# Patient Record
Sex: FEMALE | Race: WHITE
[De-identification: names, ages, dates, MRNs, and addresses within clinical notes are randomized per-mention and may not be internally consistent; named-entity substitution may affect disease eponyms.]

---
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
## 2018-09-02 ENCOUNTER — HOSPITAL ENCOUNTER (EMERGENCY)
Dept: HOSPITAL 75 - ER | Age: 18
Discharge: HOME | End: 2018-09-02
Payer: SELF-PAY

## 2018-09-02 VITALS — WEIGHT: 207 LBS | BODY MASS INDEX: 33.27 KG/M2 | HEIGHT: 66 IN

## 2018-09-02 DIAGNOSIS — E11.65: ICD-10-CM

## 2018-09-02 DIAGNOSIS — F17.210: ICD-10-CM

## 2018-09-02 DIAGNOSIS — Z90.89: ICD-10-CM

## 2018-09-02 DIAGNOSIS — L02.31: Primary | ICD-10-CM

## 2018-09-02 DIAGNOSIS — Z87.19: ICD-10-CM

## 2018-09-02 DIAGNOSIS — Z87.440: ICD-10-CM

## 2018-09-02 DIAGNOSIS — Z88.1: ICD-10-CM

## 2018-09-02 LAB
ALBUMIN SERPL-MCNC: 4 GM/DL (ref 3.2–4.5)
ALP SERPL-CCNC: 59 U/L (ref 40–136)
ALT SERPL-CCNC: 13 U/L (ref 0–55)
BASOPHILS # BLD AUTO: 0.1 10^3/UL (ref 0–0.1)
BASOPHILS NFR BLD AUTO: 0 % (ref 0–10)
BILIRUB SERPL-MCNC: 0.2 MG/DL (ref 0.1–1)
BUN/CREAT SERPL: 15
CALCIUM SERPL-MCNC: 9.2 MG/DL (ref 8.5–10.1)
CHLORIDE SERPL-SCNC: 103 MMOL/L (ref 98–107)
CO2 SERPL-SCNC: 21 MMOL/L (ref 21–32)
CREAT SERPL-MCNC: 0.89 MG/DL (ref 0.6–1.3)
EOSINOPHIL # BLD AUTO: 0.1 10^3/UL (ref 0–0.3)
EOSINOPHIL NFR BLD AUTO: 0 % (ref 0–10)
ERYTHROCYTE [DISTWIDTH] IN BLOOD BY AUTOMATED COUNT: 12.7 % (ref 10–14.5)
GFR SERPLBLD BASED ON 1.73 SQ M-ARVRAT: > 60 ML/MIN
GLUCOSE SERPL-MCNC: 300 MG/DL (ref 70–105)
HCT VFR BLD CALC: 37 % (ref 35–52)
HGB BLD-MCNC: 13.4 G/DL (ref 11.5–16)
LYMPHOCYTES # BLD AUTO: 2.7 X 10^3 (ref 1–4)
LYMPHOCYTES NFR BLD AUTO: 22 % (ref 12–44)
MANUAL DIFFERENTIAL PERFORMED BLD QL: NO
MCH RBC QN AUTO: 31 PG (ref 25–34)
MCHC RBC AUTO-ENTMCNC: 36 G/DL (ref 32–36)
MCV RBC AUTO: 86 FL (ref 80–99)
MONOCYTES # BLD AUTO: 0.6 X 10^3 (ref 0–1)
MONOCYTES NFR BLD AUTO: 5 % (ref 0–12)
NEUTROPHILS # BLD AUTO: 8.5 X 10^3 (ref 1.8–7.8)
NEUTROPHILS NFR BLD AUTO: 72 % (ref 42–75)
PLATELET # BLD: 341 10^3/UL (ref 130–400)
PMV BLD AUTO: 9.5 FL (ref 7.4–10.4)
POTASSIUM SERPL-SCNC: 3.9 MMOL/L (ref 3.6–5)
PROT SERPL-MCNC: 6.4 GM/DL (ref 6.4–8.2)
RBC # BLD AUTO: 4.33 10^6/UL (ref 4.35–5.85)
SODIUM SERPL-SCNC: 136 MMOL/L (ref 135–145)
WBC # BLD AUTO: 11.9 10^3/UL (ref 4.3–11)

## 2018-09-02 PROCEDURE — 96361 HYDRATE IV INFUSION ADD-ON: CPT

## 2018-09-02 PROCEDURE — 87070 CULTURE OTHR SPECIMN AEROBIC: CPT

## 2018-09-02 PROCEDURE — 36415 COLL VENOUS BLD VENIPUNCTURE: CPT

## 2018-09-02 PROCEDURE — 87205 SMEAR GRAM STAIN: CPT

## 2018-09-02 PROCEDURE — 87040 BLOOD CULTURE FOR BACTERIA: CPT

## 2018-09-02 PROCEDURE — 85025 COMPLETE CBC W/AUTO DIFF WBC: CPT

## 2018-09-02 PROCEDURE — 82962 GLUCOSE BLOOD TEST: CPT

## 2018-09-02 PROCEDURE — 96374 THER/PROPH/DIAG INJ IV PUSH: CPT

## 2018-09-02 PROCEDURE — 83605 ASSAY OF LACTIC ACID: CPT

## 2018-09-02 PROCEDURE — 80053 COMPREHEN METABOLIC PANEL: CPT

## 2018-09-02 NOTE — XMS REPORT
[facility]

 Created on: 2018



Melanie Carroll

External Reference #: [id number]

: 2000

Sex: Female



Demographics







 Address  [address] [place]  [address]

 

 Preferred Language  Unknown

 

 Marital Status  Unknown

 

 Yarsanism Affiliation  Unknown

 

 Race  Unknown

 

 Ethnic Group  Unknown





Author







 Author  PRAMOD  DEANNE

 

 Organization  [facility]

 

 Address  [address] [place]  [address]



 

 Phone  [phone]







Care Team Providers







 Care Team Member Name  Role  Phone

 

 DEANNE BENAVIDEZ  Unavailable  [phone]







PROBLEMS







 Type  Condition  ICD9-CM Code  RHJ74-AL Code  Onset Dates  Condition Status  
SNOMED Code

 

 Problem  Attention deficit disorder     F90.0     Active  814371004

 

 Problem  Mood disorder     F39     Active  25266289

 

 Problem  Depressive disorder, not elsewhere classified     F32.9     Active  
25932327

 

 Problem  Dysthymic disorder     F34.1     Active  06000405

 

 Problem  Anxiety disorder, unspecified     F41.9     Active  420802150

 

 Problem  Social phobia     F40.10     Active  74994651

 

 Problem  Other and unspecified noninfectious gastroenteritis and colitis  
558.9        Active  88195345







ALLERGIES

No Information



ENCOUNTERS







 Encounter  Location  Date  Diagnosis

 

 [facility]  [address] [phone]
[phone]     

 

 [facility]  [address] [address]-
[address]     

 

 [facility]  [address] [address]
2697  20 2018  Cystitis N30.90 and Dysuria R30.0

 

 [facility]  [id number] [place] [address] 
[id number]  15 2018  Dental examination Z01.20

 

 [facility]  [address] [phone]
[phone]  15 2018  Fever, unspecified fever cause R50.9 and Acute 
nasopharyngitis J00

 

 [facility]  [address] [address]-
[address]  31 Aug, 2017  Mood disorder F39

 

 [facility]  [address] [address]-
[address]  21 Aug, 2017  Mood disorder F39

 

 [facility]  [address] [address]-
2214  22 Mar, 2017  Depressive disorder, not elsewhere classified F32.9 and 
Social phobia F40.10

 

 [facility]  [address] [id number] 
[id number]  22 Mar, 2016  Encounter for dental examination Z01.20

 

 [facility]  [id number] [place] [id number]
[phone]  29 Sep, 2015  Depressive disorder, not elsewhere classified 311 ; Anxiety 
state, unspecified 300.00 and Attention deficit disorder without mention of 
hyperactivity 314.00

 

 [facility]  [id number] [place] [id number]
[phone]  01 Sep, 2015  Depressive disorder, not elsewhere classified 311 ; Anxiety 
state, unspecified 300.00 and Attention deficit disorder without mention of 
hyperactivity 314.00

 

 [facility]  [address] [place] [address]-
2728    Depressive disorder, not elsewhere classified 311

 

 [facility]  [id number] [place] [id number]
[phone]  13 May, 2015  Depressive disorder, not elsewhere classified 311

 

 [facility]  [id number] [place] [phone]
[phone]     

 

 [facility]  [address] [address]-
[address]     

 

 [facility]  [address] [place] [address] [id number]
[phone]     

 

 [facility]  [address] [place] [phone]
[phone]  13 Mar, 2015   

 

 [facility]  [id number] [place] [address] [phone]
[phone]  13 Mar, 2015   

 

 [facility]  [address] [address]-
[address]     

 

 [facility]  [id number] [place] [address]-
2274     

 

 [facility]  [address] [id number] [place] [address]-
6201  12 Dec, 2014   

 

 [facility] [place] [facility]  [address] [id number] [place] [address]-
5152  12 Dec, 2014   

 

 [facility]  [address] [id number] [place] [address]-
[address]     

 

 [facility] [place] [facility]  [address] [id number] [place] [address]-
[address]     

 

 [facility]  [address] [id number] [place] [address]-
7301  09 Oct, 2014   

 

 [facility] [place] [facility]  [address] [id number] [place] [address]-
6269  09 Oct, 2014   

 

 [facility] [place] [facility]  [address] [id number] [place] [address]-
8518  22 Aug, 2014   

 

 [facility] [place] [facility]  [address] [id number] [place] [address]-
2294  22 Aug, 2014   

 

 [facility] [place] [facility]  [address] [id number] [place] [address]-
6819  10 Jul, 2014   

 

 [facility]  [address] [id number] [place] [address]-
2269  10 Jul, 2014   

 

 [facility] [place] [facility]  [address] [id number] [place] [address]-
[address]  23 May, 2014   

 

 [facility] [place] [facility]  [address] [id number] [place] [address]-
9026  23 May, 2014   

 

 [facility] [place] [facility]  [address] [id number] [place] [address]-
59[address]     

 

 [facility] [place] [facility]  [address] [id number] [place] [address]-
6686  05 Dec, 2012   

 

 [facility] [place] [facility]  [address] [id number] [place] [address]-
8228  05 Dec, 2012   

 

 [facility]  [address] [facility] [id number] [place] [id number]-
6664  17 Dec, 2008   







IMMUNIZATIONS

No Known Immunizations



SOCIAL HISTORY

Never Assessed



REASON FOR VISIT





PLAN OF CARE





VITAL SIGNS





MEDICATIONS

Unknown Medications



RESULTS

No Results



PROCEDURES

No Known procedures



INSTRUCTIONS





MEDICATIONS ADMINISTERED

No Known Medications



MEDICAL (GENERAL) HISTORY







 Type  Description  Date

 

 Medical History  Diabetes-II   

 

 Surgical History  Tonsils removed  

 

 Surgical History  Blood infection  2011

## 2018-09-02 NOTE — ED INTEGUMENTARY GENERAL
General


Chief Complaint:  Skin/Wound Problems


Stated Complaint:  BOIL IN BETWEEN LEGS


Nursing Triage Note:  


left lower buttock area of concern


Source:  patient


Exam Limitations:  no limitations





History of Present Illness


Date Seen by Provider:  Sep 2, 2018


Time Seen by Provider:  22:00


Initial Comments


Patient is a 18-year-old female who presents to emergency room with complaints 

of an abscess to her left lower gluteal fold. She reports that this appeared 2 

days ago and is very painful she is rating pain 10 out of 10. She also reports 

fever, she is a type II diabetic and has not been on her medications for over a 

month. She reports that the abscess is open and draining for 1 day.


Timing/Duration:  other (2 days)


Location:  genitalia (left gluteal fold)


Associated Symptoms:  fever





Allergies and Home Medications


Allergies


Coded Allergies:  


     ceftriaxone (Unverified  Allergy, Mild, HIVES, 8/30/10)





Home Medications


Hydrocodone Bit/Acetaminophen 1 Tab Tab, 1 EACH PO Q6H PRN for PAIN


   Prescribed by: NAEL SANTOS on 9/2/18 2151


Sulfamethoxazole/Trimethoprim 1 Each Tablet, 1 EACH PO BID


   Prescribed by: NAEL SANTOS on 9/2/18 2151





Patient Home Medication List


Home Medication List Reviewed:  Yes





Review of Systems


Review of Systems


Constitutional:  see HPI, chills, fever


Pregnant:  No


Skin:  see HPI, other (abscess to her left intergluteal fold.)





All Other Systems Reviewed


Negative Unless Noted:  Yes





Past Medical-Social-Family Hx


Past Med/Social Hx:  Reviewed Nursing Past Med/Soc Hx


Patient Social History


Alcohol Use:  Denies Use


Recreational Drug Use:  No


Smoking Status:  Current Everyday Smoker


Type Used:  Cigarettes


2nd Hand Smoke Exposure:  Yes


Recent Foreign Travel:  No


Contact w/Someone Who Travel:  No


Recent Infectious Disease Expo:  No


Recent Hopitalizations:  No





Immunizations Up To Date


Tetanus Booster (TDap):  Less than 5yrs


PED Vaccines UTD:  No


Date of Pneumonia Vaccine:  Jan 7, 2013


Date of Influenza Vaccine:  Jan 7, 2013





Seasonal Allergies


Seasonal Allergies:  Yes





Past Medical History


Surgeries:  Yes (T&A, MULTIPLE ABSCESS I&D'S, "BLADDER STRETCHED" WHEN YOUNGER)


Adenoidectomy, Bladder Surgery, Tonsillectomy


Respiratory:  No


Cardiac:  No


Neurological:  No


Female Reproductive Disorders:  Denies


Genitourinary:  Yes


UTI-Chronic


Gastrointestinal:  Yes (Chronic constipation)


Chronic Constipation


Musculoskeletal:  No


Endocrine:  No


Diabetes, Insulin dep


HEENT:  No


Cancer:  No


Psychosocial:  No


Integumentary:  Yes


Blood Disorders:  No





Family Medical History


Reviewed Nursing Family Hx





Physical Exam


Vital Signs





Vital Signs - First Documented








 9/2/18 9/2/18





 20:00 22:10


 


Temp 98.9 


 


Pulse 98 


 


Resp 18 


 


B/P (MAP) 149/93 


 


Pulse Ox  99


 


O2 Delivery Room Air 





Capillary Refill :


General Appearance:  WD/WN, no apparent distress


HEENT:  PERRL/EOMI, normal ENT inspection, TMs normal, pharynx normal


Neck:  non-tender, full range of motion, supple, normal inspection


Cardiovascular:  normal peripheral pulses, regular rate, rhythm, no edema, no 

gallop, no JVD, no murmur


Respiratory:  chest non-tender, lungs clear, normal breath sounds, no 

respiratory distress, no accessory muscle use


Gastrointestinal:  normal bowel sounds, non tender, soft, no organomegaly, no 

pulsatile mass


Back:  normal inspection, no CVA tenderness, no vertebral tenderness


Neurologic/Psychiatric:  alert, normal mood/affect, oriented x 3


Skin:  normal color, warm/dry


Skin Problem Location:  other (left inner gluteal fold)


Skin Problem Character:  abscess, drainage (brown purulent drainage), other (

Exam was assisted by Jeannie ALEGRIA.)


Lymphatic:  no adenopathy





Progress/Results/Core Measures


Results/Orders


Lab Results





Laboratory Tests








Test


 9/2/18


20:45 9/2/18


22:00 Range/Units


 


 


White Blood Count


 11.9 H


 


 4.3-11.0


10^3/uL


 


Red Blood Count


 4.33 L


 


 4.35-5.85


10^6/uL


 


Hemoglobin 13.4   11.5-16.0  G/DL


 


Hematocrit 37   35-52  %


 


Mean Corpuscular Volume 86   80-99  FL


 


Mean Corpuscular Hemoglobin 31   25-34  PG


 


Mean Corpuscular Hemoglobin


Concent 36 


 


 32-36  G/DL





 


Red Cell Distribution Width 12.7   10.0-14.5  %


 


Platelet Count


 341 


 


 130-400


10^3/uL


 


Mean Platelet Volume 9.5   7.4-10.4  FL


 


Neutrophils (%) (Auto) 72   42-75  %


 


Lymphocytes (%) (Auto) 22   12-44  %


 


Monocytes (%) (Auto) 5   0-12  %


 


Eosinophils (%) (Auto) 0   0-10  %


 


Basophils (%) (Auto) 0   0-10  %


 


Neutrophils # (Auto) 8.5 H  1.8-7.8  X 10^3


 


Lymphocytes # (Auto) 2.7   1.0-4.0  X 10^3


 


Monocytes # (Auto) 0.6   0.0-1.0  X 10^3


 


Eosinophils # (Auto)


 0.1 


 


 0.0-0.3


10^3/uL


 


Basophils # (Auto)


 0.1 


 


 0.0-0.1


10^3/uL


 


Sodium Level 136   135-145  MMOL/L


 


Potassium Level 3.9   3.6-5.0  MMOL/L


 


Chloride Level 103     MMOL/L


 


Carbon Dioxide Level 21   21-32  MMOL/L


 


Anion Gap 12   5-14  MMOL/L


 


Blood Urea Nitrogen 13   7-18  MG/DL


 


Creatinine


 0.89 


 


 0.60-1.30


MG/DL


 


Estimat Glomerular Filtration


Rate > 60 


 


  





 


BUN/Creatinine Ratio 15    


 


Glucose Level 300 H    MG/DL


 


Lactic Acid Level


 1.24 


 


 0.50-2.00


MMOL/L


 


Calcium Level 9.2   8.5-10.1  MG/DL


 


Corrected Calcium 9.2   8.5-10.1  MG/DL


 


Total Bilirubin 0.2   0.1-1.0  MG/DL


 


Aspartate Amino Transf


(AST/SGOT) 9 


 


 5-34  U/L





 


Alanine Aminotransferase


(ALT/SGPT) 13 


 


 0-55  U/L





 


Alkaline Phosphatase 59     U/L


 


Total Protein 6.4   6.4-8.2  GM/DL


 


Albumin 4.0   3.2-4.5  GM/DL


 


Glucometer  144 H   MG/DL








Micro Results





Microbiology


9/2/18 Blood Culture - Preliminary, Resulted


         No growth


9/2/18 Blood Culture - Preliminary, Resulted


         Staph, Coag Neg (CNS)


         See Comments


9/2/18 Gram Stain - Final, Complete


         


9/2/18 Wound Culture - Final, Complete


         See Comments





My Orders





Orders - NAEL SANTOS


Cbc With Automated Diff (9/2/18 20:30)


Comprehensive Metabolic Panel (9/2/18 20:30)


Blood Culture (9/2/18 20:30)


Saline Lock/Iv-Start (9/2/18 20:30)


Lactic Acid Analyzer (9/2/18 20:30)


Wound Culture (9/2/18 20:51)


Hydrocodone/Apap 7.5/325 Tab (Lortab 7. (9/2/18 21:15)


Insulin (Regular) Human (Humulin R (Per (9/2/18 21:30)


Ns Iv 1000 Ml (Sodium Chloride 0.9%) (9/2/18 21:30)





Medications Given in ED





Vital Signs/I&O











 9/2/18 9/2/18





 20:00 22:10


 


Temp 98.9 97.8


 


Pulse 98 65


 


Resp 18 18


 


B/P (MAP) 149/93 


 


Pulse Ox  99


 


O2 Delivery Room Air Room Air











Progress


Progress Note :  


   Time:  21:30


Progress Note


I have seen and evaluated the patient. A culture was sent of the drainage. Her 

blood sugars have trended down with insulin and Iv fluids. She has remained 

fever free. She agrees with plans for discharge. Close follow up with PCP. 

Return precautions were given.





Departure


Impression





 Primary Impression:  


 Abscess


 Additional Impression:  


 Uncontrolled type 2 diabetes mellitus


Disposition:  01 HOME, SELF-CARE


Condition:  Stable/Unchanged





Departure-Patient Inst.


Decision time for Depature:  21:38


Referrals:  


MARCELA CORREA DO (PCP/Family)


Primary Care Physician


Patient Instructions:  Diabetes Type 2 (DC), Diabetes and Infections, Skin 

Abscess





Add. Discharge Instructions:  


Take medications as directed and as previously prescribed. Follow-up with your 

primary care provider within 1 week for a recheck. Call first thing Tuesday 

morning for an appointment time. Return back to emergency room for any 

worsening symptoms or concerns as needed. All discharge instructions reviewed 

with patient and/or family. Voiced understanding.


Scripts


Hydrocodone Bit/Acetaminophen (Hydrocodone/Acetaminophen 5/325mg Tablet) 1 Tab 

Tab


1 EACH PO Q6H PRN for PAIN, #14 TAB


   Prov: NAEL SANTOS         9/2/18 


Sulfamethoxazole/Trimethoprim (Bactrim Ds Tablet) 1 Each Tablet


1 EACH PO BID for 10 Days, #20 TAB


   Prov: NAEL SANTOS         9/2/18











NAEL SANTOS Sep 2, 2018 20:51

## 2018-09-02 NOTE — XMS REPORT
Continuity of Care Document

 Created on: 2018



PONCE SINGH

External Reference #: [id number]

: 2000

Sex: Female



Demographics







 Address  [address] [place]  [address]

 

 Home Phone  [phone] x

 

 Preferred Language  Unknown

 

 Marital Status  Unknown

 

 Buddhism Affiliation  Unknown

 

 Race  Unknown

 

 Ethnic Group  Unknown





Author







 Author  [facility]

 

 Address  Unknown

 

 Phone  Unavailable



              



Allergies

      





 Active            Description            Code            Type            
Severity            Reaction            Onset            Reported/Identified   
         Relationship to Patient            Clinical Status        

 

 Yes            Rocephin                         Drug Allergy                  
                                 2009                                  

 

 Yes            Rocephin                         Drug Allergy            N/A   
         N/A                         2009                                
  

 

 Yes            ceftriaxone            [id number]            Drug Allergy      
      Mild            HIVES                         2010                 
                 



                      



Medications

      



There is no data.                  



Problems

      





 Date Dx Coded            Attending            Type            Code            
Diagnosis            Diagnosed By        

 

 2008            DEISI NAYLOR DO K                         465.9          
  UPPER RESPIRATORY INFECTION                     

 

 2008            DOUG LENZ LCPC B                         465.9      
      UPPER RESPIRATORY INFECTION                     

 

 2008            LEIGH ANN TEJADA PSYD L                         465.9  
          UPPER RESPIRATORY INFECTION                     

 

 2008            LEIGH ANN TEJADA PSYD L                         465.9  
          UPPER RESPIRATORY INFECTION                     

 

 2008            [facility], ABILIO R                         465.9   
         UPPER RESPIRATORY INFECTION                     

 

 2008            [facility], ABILIO R                         465.9   
         UPPER RESPIRATORY INFECTION                     

 

 2008            MAO TY                         465.9    
        UPPER RESPIRATORY INFECTION                     

 

 2008            [facility], ABILIO R                         465.9   
         UPPER RESPIRATORY INFECTION                     

 

 2009            YOVANY NAYLOR DOA K                         278.00         
   OBESITY                     

 

 2009            DEISI NAYLOR DO K                         381.81         
   EUSTACHIAN TUBE DYSFUNCTION                     

 

 2009            YOVANY NAYLOR DOA K                         788.1          
  pain during urination (dysuria)                     

 

 2009            DELFIN MCCARTY DOUG B                         278.00     
       OBESITY                     

 

 2009            DELFIN MCCARTY DOUG B                         381.81     
       EUSTACHIAN TUBE DYSFUNCTION                     

 

 2009            DELFIN MCCARTY DOUG B                         788.1      
      pain during urination (dysuria)                     

 

 2009            LEIGH ANN TEJADA PSYD L                         278.00 
           OBESITY                     

 

 2009            LEIGH ANN TEJADA PSYD L                         381.81 
           EUSTACHIAN TUBE DYSFUNCTION                     

 

 2009            MCCLEEARY PSYD, AVA L                         788.1  
          pain during urination (dysuria)                     

 

 2009            LEIGH ANN TEJADA PSYD L                         278.00 
           OBESITY                     

 

 2009            LEIGH ANN TEJADA PSYD L                         381.81 
           EUSTACHIAN TUBE DYSFUNCTION                     

 

 2009            LEIGH ANN TEJADA PSYD L                         788.1  
          pain during urination (dysuria)                     

 

 2009            MOISES LSCS, ABILIO R                         278.00  
          OBESITY                     

 

 2009            MOISES LSCS, ABILIO R                         381.81  
          EUSTACHIAN TUBE DYSFUNCTION                     

 

 2009            MOISES LSCS, ABILIO R                         788.1   
         pain during urination (dysuria)                     

 

 2009            MOISES LSCS, ABILIO R                         278.00  
          OBESITY                     

 

 2009            MOISES LSCS, ABILIO R                         381.81  
          EUSTACHIAN TUBE DYSFUNCTION                     

 

 2009            MOISES LSCS, ABILIO R                         788.1   
         pain during urination (dysuria)                     

 

 2009            HOSSEIN [place], MAO A                         278.00   
         OBESITY                     

 

 2009            HOSSEIN APRN, MAO A                         381.81   
         EUSTACHIAN TUBE DYSFUNCTION                     

 

 2009            RAJKELLYE APRN, MAO A                         788.1    
        pain during urination (dysuria)                     

 

 2009            MOISES LSCS, ABILIO R                         278.00  
          OBESITY                     

 

 2009            MOISES LSCS, ABILIO R                         381.81  
          EUSTACHIAN TUBE DYSFUNCTION                     

 

 2009            MOISES LSCS, ABILIO R                         788.1   
         pain during urination (dysuria)                     

 

 2011                         Ot            786.50                       
           

 

 2011                         Ot            786.52                       
           

 

 2011                         Ot            250.00                       
           

 

 2011                         Ot            465.9                        
          

 

 2011                         Ot            780.60                       
           

 

 2011                         Ot            V58.69                       
           

 

 2013                         Ot            250.02            DIAB KEENAN 
WO COMPL, TYPE II OR UNSPEC TY                     

 

 2013                         Ot            278.00            OBESITY, 
NOS                     

 

 2013                         Ot            462            ACUTE 
PHARYNGITIS                     

 

 2013                         Ot            493.00            EXTRINSIC 
ASTHMA, NOS                     

 

 2013                         Ot            682.2            CELLULITIS 
OF TRUNK                     

 

 2013                         Ot            785.0            TACHYCARDIA 
NOS                     

 

 2013                         Ot            V12.04            PERSONAL 
HIST OF METHICILLIN RESISTANT S                     

 

 2013                         Ot            V12.29            PERSONAL HX 
OF OTH ENDOCRINE, METABOLIC                      

 

 2013                         Ot            V15.81            HX OF PAST 
NONCOMPLIANCE                     

 

 2014            DOUG LENZ LCPC                         311        
    DEPRESSIVE DISORDER NOS                     

 

 2014            LEIGH ANN TEJADA PSYD                         311    
        DEPRESSIVE DISORDER NOS                     

 

 2014            LEIGH ANN TEJADA PSYD                         311    
        DEPRESSIVE DISORDER NOS                     

 

 2014            [facility], ABILIO R                         311     
       DEPRESSIVE DISORDER NOS                     

 

 2014            [facility], ABILIO R                         311     
       DEPRESSIVE DISORDER NOS                     

 

 2014            GREG TYYL A                         311      
      DEPRESSIVE DISORDER NOS                     

 

 2014            [facility], ABILIO R                         311     
       DEPRESSIVE DISORDER NOS                     

 

 2015            GREG TYYL A                         558.9    
        GASTROENTERITIS NONINFECTIOUS                     

 

 2015            [facility], ABILIO R                         558.9   
         GASTROENTERITIS NONINFECTIOUS                     

 

 03/10/2016                         Ot            789.00                       
           

 

 03/10/2016                         Ot            789.00                       
           

 

 2016            SAMANTHA CHAUDHARY APRALLI            Ot            M25.561 
                                 

 

 2016            SAMANTHA CHAUDHARY            Ot            M25.562 
                                 

 

 2016            MARGI CORREA DO S            Ot            R10.32
            LEFT LOWER QUADRANT PAIN                     

 

 2016            MARGI CORREA DO S            Ot            R10.32
            LEFT LOWER QUADRANT PAIN                     

 

 2016            MAXI CAST DO            Ot            E11.9          
  TYPE 2 DIABETES MELLITUS WITHOUT COMPLIC                     

 

 2016            MAXI CAST DO            Ot            E66.9          
  OBESITY, UNSPECIFIED                     

 

 2016            MAXI CAST DO            Ot            F17.210        
    NICOTINE DEPENDENCE, CIGARETTES, UNCOMPL                     

 

 2016            MAXI CAST DO            Ot            L02.211        
    CUTANEOUS ABSCESS OF ABDOMINAL WALL                     

 

 2016            MAXI CAST DO            Ot            Z79.4          
  LONG TERM (CURRENT) USE OF INSULIN                     

 

 2016            MAXI CAST DO            Ot            Z86.14         
   PERSONAL HISTORY OF METHICILLIN RESIS ST                     

 

 2016                         Ot            R10.2            PELVIC AND 
PERINEAL PAIN                     

 

 2016            MAXI CAST DO            Ot            E11.9          
  TYPE 2 DIABETES MELLITUS WITHOUT COMPLIC                     

 

 2016            MAXI CAST DO            Ot            E66.9          
  OBESITY, UNSPECIFIED                     

 

 2016            MAXI CAST DO            Ot            F17.210        
    NICOTINE DEPENDENCE, CIGARETTES, UNCOMPL                     

 

 2016            MAXI CAST DO            Ot            L02.211        
    CUTANEOUS ABSCESS OF ABDOMINAL WALL                     

 

 2016            MAXI CAST DO            Ot            Z79.4          
  LONG TERM (CURRENT) USE OF INSULIN                     

 

 2016            MAXI CAST DO            Ot            Z86.14         
   PERSONAL HISTORY OF METHICILLIN RESIS ST                     

 

 06/10/2016            RODOLFO CORREA DOLINE S            Ot            R10.32
            LEFT LOWER QUADRANT PAIN                     

 

 2017                         Ot            789.00            ABDOMINAL 
PAIN, UNSPECIFIED SITE                     

 

 2017            SAMANTHA CHAUDHARY APRN            Ot            M25.561 
           PAIN IN RIGHT KNEE                     

 

 2017            SAMANTHA CHAUDHARY APRN            Ot            M25.562 
           PAIN IN LEFT KNEE                     

 

 2017                         Ot            R10.2            PELVIC AND 
PERINEAL PAIN                     

 

 2017            MARGI CORREA DO S            Ot            R10.32
            LEFT LOWER QUADRANT PAIN                     

 

 2017            ABHI ARCHULETA            Ot            F17.210    
        NICOTINE DEPENDENCE, CIGARETTES, UNCOMPL                     

 

 2017            ABHI ARCHULETA            Ot            J02.9      
      ACUTE PHARYNGITIS, UNSPECIFIED                     

 

 2017            ABHI ARCHULETA            Ot            J06.9      
      ACUTE UPPER RESPIRATORY INFECTION, UNSPE                     

 

 2017            ABHI ARCHULETA            Ot            F17.210    
        NICOTINE DEPENDENCE, CIGARETTES, UNCOMPL                     

 

 2017            ABHI ARCHULETA            Ot            J02.9      
      ACUTE PHARYNGITIS, UNSPECIFIED                     

 

 2017            ABHI ARCHULETA            Ot            J06.9      
      ACUTE UPPER RESPIRATORY INFECTION, UNSPE                     

 

 03/15/2017                         Ot            789.00            ABDOMINAL 
PAIN, UNSPECIFIED SITE                     

 

 03/15/2017            SAMANTHA CHAUDHARY APRN            Ot            M25.561 
           PAIN IN RIGHT KNEE                     

 

 03/15/2017            SAMANTHA CHAUDHARY APRN            Ot            M25.562 
           PAIN IN LEFT KNEE                     

 

 03/15/2017                         Ot            R10.2            PELVIC AND 
PERINEAL PAIN                     

 

 03/15/2017            MARGI CORREA DO S            Ot            R10.32
            LEFT LOWER QUADRANT PAIN                     

 

 2017            ABHI ARCHULETA            Ot            F41.9      
      ANXIETY DISORDER, UNSPECIFIED                     

 

 2017            ABHI ARCHULETA APRALLI            Ot            L02.215    
        CUTANEOUS ABSCESS OF PERINEUM                     

 

 2017            ABHI ARCHULETA APRN            Ot            F41.9      
      ANXIETY DISORDER, UNSPECIFIED                     

 

 2017            ABHI ARCHULETA APRN            Ot            L02.215    
        CUTANEOUS ABSCESS OF PERINEUM                     

 

 2017            ABHI ARCHULETA APRN            Ot            L02.215    
        CUTANEOUS ABSCESS OF PERINEUM                     

 

 2017            ABHI ARCHULETA APRN            Ot            R00.0      
      TACHYCARDIA, UNSPECIFIED                     

 

 2017            ABHI ARCHULETA APRN            Ot            F41.9      
      ANXIETY DISORDER, UNSPECIFIED                     

 

 2017            ABHI ARCHULETA APRN            Ot            L02.215    
        CUTANEOUS ABSCESS OF PERINEUM                     

 

 2017            GUERA MURDOCK MD            Ot            M25.561   
         PAIN IN RIGHT KNEE                     

 

 2017            GUERA MURDOCK MD            Ot            M23.8X1   
         OTHER INTERNAL DERANGEMENTS OF RIGHT KNE                     

 

 2017            GUERA MURDOCK MD            Ot            Z01.818   
         ENCOUNTER FOR OTHER PREPROCEDURAL EXAMIN                     

 

 2017            GUERA MURDOCK MD            Ot            Z11.2     
       ENCOUNTER FOR SCREENING FOR OTHER BACTER                     

 

 2017                         Ot            789.00            ABDOMINAL 
PAIN, UNSPECIFIED SITE                     

 

 2017            SAMANTHA CHAUDHARY APRN            Ot            M25.561 
           PAIN IN RIGHT KNEE                     

 

 2017            SAMANTHA CHAUDHARY APRN            Ot            M25.562 
           PAIN IN LEFT KNEE                     

 

 2017                         Ot            R10.2            PELVIC AND 
PERINEAL PAIN                     

 

 2017            MARGI CORREA DO            Ot            R10.32
            LEFT LOWER QUADRANT PAIN                     

 

 2017            GUERA MURDOCK MD            Ot            M25.561   
         PAIN IN RIGHT KNEE                     

 

 2017            ABHI ARCHULETA APRN            Ot            F41.9      
      ANXIETY DISORDER, UNSPECIFIED                     

 

 2017            ABHI ARCHULETA APRN            Ot            L02.215    
        CUTANEOUS ABSCESS OF PERINEUM                     



                                                                               
                                                                               
                                                                               
   



Procedures

      





 Code            Description            Performed By            Performed On   
     

 

             86.04                                  OTHER SKIN   SUBQ I   D    
                               2013        

 

             42712                                  PSYCH DIAGNOSTIC EVALUATION
                                   2014        

 

             90626                                  PSYTX PT&/FAMILY 45 MINUTES
                                   2014        

 

             88060                                  PSYTX PT&/FAMILY 30 MINUTES
                                   10/09/2014        

 

             77028                                  PSYTX PT&/FAMILY 45 MINUTES
                                   2014        

 

             44116                                  PSYTX PT&/FAMILY 45 MINUTES
                                   2014        

 

             26034                                  PSYTX PT&/FAMILY 45 MINUTES
                                   2015        



                              



Results

      





 Test            Result            Range        









 Complete blood count (CBC) with automated white blood cell (WBC) differential 
- 17 14:40         









 Blood leukocytes automated count (number/volume)            6.3 10*3/uL       
     4.3-11.0        

 

 Blood erythrocytes automated count (number/volume)            4.91 10*6/uL    
        4.35-5.85        

 

 Venous blood hemoglobin measurement (mass/volume)            14.8 g/dL        
    11.5-16.0        

 

 Blood hematocrit (volume fraction)            42 %            35-52        

 

 Automated erythrocyte mean corpuscular volume            86 [foz_us]          
  80-99        

 

 Automated erythrocyte mean corpuscular hemoglobin (mass per erythrocyte)      
      30 pg            25-34        

 

 Automated erythrocyte mean corpuscular hemoglobin concentration measurement (
mass/volume)            35 g/dL            32-36        

 

 Automated erythrocyte distribution width ratio            13.1 %            
10.0-14.5        

 

 Automated blood platelet count (count/volume)            298 10*3/uL          
  130-400        

 

 Automated blood platelet mean volume measurement            9.6 [foz_us]      
      7.4-10.4        

 

 Automated blood neutrophils/100 leukocytes            64 %            42-75   
     

 

 Automated blood lymphocytes/100 leukocytes            22 %            12-44   
     

 

 Blood monocytes/100 leukocytes            13 %            0-12        

 

 Automated blood eosinophils/100 leukocytes            0 %            0-10     
   

 

 Automated blood basophils/100 leukocytes            1 %            0-10        

 

 Blood neutrophils automated count (number/volume)            4.0 10*3         
   1.8-7.8        

 

 Blood lymphocytes automated count (number/volume)            1.4 10*3         
   1.0-4.0        

 

 Blood monocytes automated count (number/volume)            0.8 10*3            
0.0-1.0        

 

 Automated eosinophil count            0.0 10*3/uL            0.0-0.3        

 

 Automated blood basophil count (count/volume)            0.0 10*3/uL          
  0.0-0.1        









 Serum heterophile antibody titer - 17 14:40         









 Serum heterophile antibody titer            NEGATIVE             NEGATIVE     
   









 Serum or plasma choriogonadotropin (pregnancy test) detection - 17 14:40
         









 Serum or plasma choriogonadotropin (pregnancy test) detection            
NEGATIVE             NEGATIVE        









 Comprehensive metabolic panel - 17 14:40         









 Serum or plasma sodium measurement (moles/volume)            137 mmol/L       
     135-145        

 

 Serum or plasma potassium measurement (moles/volume)            3.9 mmol/L    
        3.6-5.0        

 

 Serum or plasma chloride measurement (moles/volume)            105 mmol/L     
               

 

 Carbon dioxide            20 mmol/L            21-32        

 

 Serum or plasma anion gap determination (moles/volume)            12 mmol/L   
         5-14        

 

 Serum or plasma urea nitrogen measurement (mass/volume)            8 mg/dL    
        7-18        

 

 Serum or plasma creatinine measurement (mass/volume)            0.81 mg/dL    
        0.60-1.30        

 

 Serum or plasma urea nitrogen/creatinine mass ratio            10             
NRG        

 

 Serum or plasma creatinine measurement with calculation of estimated 
glomerular filtration rate            >             NRG        

 

 Serum or plasma glucose measurement (mass/volume)            179 mg/dL        
            

 

 Serum or plasma calcium measurement (mass/volume)            9.0 mg/dL        
    8.5-10.1        

 

 Serum or plasma total bilirubin measurement (mass/volume)            0.5 mg/dL
            0.1-1.0        

 

 Serum or plasma alkaline phosphatase measurement (enzymatic activity/volume)  
          50 U/L                    

 

 Serum or plasma aspartate aminotransferase measurement (enzymatic activity/
volume)            12 U/L            5-34        

 

 Serum or plasma alanine aminotransferase measurement (enzymatic activity/volume
)            15 U/L            0-55        

 

 Serum or plasma protein measurement (mass/volume)            6.9 g/dL         
   6.4-8.2        

 

 Serum or plasma albumin measurement (mass/volume)            4.1 g/dL         
   3.2-4.5        









 Influenza virus A and B antigen detection - 17 15:00         









 FLU RESULT            NEGATIVE FOR INFLUENZA A AND B ANTIGENS BY IA           
  [facility]        









 Gram stain microscopy - 17 22:37         









 GRAM STAIN RESULT            FEW GRAM NEGATIVE RODS             NRG        









 Bacteria identification in wound by culture - 17 22:37         









 Bacteria identification in wound by culture            [id number]             
NRG        

 

 FREE TEXT EXTERNAL            (ANAEROBIC GRAM NEGATIVE EDEN)             NRG   
     

 

 QUANTITY OF GROWTH            Isolated             NRG        

 

 FREE TEXT ENTRY 2            SENSITIVITY IS REQUESTED ON THIS ISOLATE         
    NR        









 Complete blood count (CBC) with automated white blood cell (WBC) differential 
- 17 22:50         









 Blood leukocytes automated count (number/volume)            11.4 10*3/uL      
      4.3-11.0        

 

 Blood erythrocytes automated count (number/volume)            4.55 10*6/uL    
        4.35-5.85        

 

 Venous blood hemoglobin measurement (mass/volume)            13.8 g/dL        
    11.5-16.0        

 

 Blood hematocrit (volume fraction)            40 %            35-52        

 

 Automated erythrocyte mean corpuscular volume            87 [foz_us]          
  80-99        

 

 Automated erythrocyte mean corpuscular hemoglobin (mass per erythrocyte)      
      30 pg            25-34        

 

 Automated erythrocyte mean corpuscular hemoglobin concentration measurement (
mass/volume)            35 g/dL            32-36        

 

 Automated erythrocyte distribution width ratio            13.2 %            
10.0-14.5        

 

 Automated blood platelet count (count/volume)            310 10*3/uL          
  130-400        

 

 Automated blood platelet mean volume measurement            9.4 [foz_us]      
      7.4-10.4        

 

 Automated blood neutrophils/100 leukocytes            65 %            42-75   
     

 

 Automated blood lymphocytes/100 leukocytes            27 %            12-44   
     

 

 Blood monocytes/100 leukocytes            8 %            0-12        

 

 Automated blood eosinophils/100 leukocytes            1 %            0-10     
   

 

 Automated blood basophils/100 leukocytes            0 %            0-10        

 

 Blood neutrophils automated count (number/volume)            7.4 10*3         
   1.8-7.8        

 

 Blood lymphocytes automated count (number/volume)            3.0 10*3         
   1.0-4.0        

 

 Blood monocytes automated count (number/volume)            0.9 10*3            
0.0-1.0        

 

 Automated eosinophil count            0.1 10*3/uL            0.0-0.3        

 

 Automated blood basophil count (count/volume)            0.0 10*3/uL          
  0.0-0.1        









 Complete blood count (CBC) with automated white blood cell (WBC) differential 
- 17 12:05         









 Blood leukocytes automated count (number/volume)            7.7 10*3/uL       
     4.3-11.0        

 

 Blood erythrocytes automated count (number/volume)            4.59 10*6/uL    
        4.35-5.85        

 

 Venous blood hemoglobin measurement (mass/volume)            13.8 g/dL        
    11.5-16.0        

 

 Blood hematocrit (volume fraction)            40 %            35-52        

 

 Automated erythrocyte mean corpuscular volume            88 [foz_us]          
  80-99        

 

 Automated erythrocyte mean corpuscular hemoglobin (mass per erythrocyte)      
      30 pg            25-34        

 

 Automated erythrocyte mean corpuscular hemoglobin concentration measurement (
mass/volume)            34 g/dL            32-36        

 

 Automated erythrocyte distribution width ratio            13.2 %            
10.0-14.5        

 

 Automated blood platelet count (count/volume)            277 10*3/uL          
  130-400        

 

 Automated blood platelet mean volume measurement            9.8 [foz_us]      
      7.4-10.4        

 

 Automated blood neutrophils/100 leukocytes            61 %            42-75   
     

 

 Automated blood lymphocytes/100 leukocytes            31 %            12-44   
     

 

 Blood monocytes/100 leukocytes            7 %            0-12        

 

 Automated blood eosinophils/100 leukocytes            1 %            0-10     
   

 

 Automated blood basophils/100 leukocytes            1 %            0-10        

 

 Blood neutrophils automated count (number/volume)            4.7 10*3         
   1.8-7.8        

 

 Blood lymphocytes automated count (number/volume)            2.3 10*3         
   1.0-4.0        

 

 Blood monocytes automated count (number/volume)            0.5 10*3            
0.0-1.0        

 

 Automated eosinophil count            0.1 10*3/uL            0.0-0.3        

 

 Automated blood basophil count (count/volume)            0.1 10*3/uL          
  0.0-0.1        









 Whole blood basic metabolic panel - 17 12:05         









 Serum or plasma sodium measurement (moles/volume)            134 mmol/L       
     135-145        

 

 Serum or plasma potassium measurement (moles/volume)            4.7 mmol/L    
        3.6-5.0        

 

 Serum or plasma chloride measurement (moles/volume)            103 mmol/L     
               

 

 Carbon dioxide            21 mmol/L            21-32        

 

 Serum or plasma anion gap determination (moles/volume)            10 mmol/L   
         5-14        

 

 Serum or plasma urea nitrogen measurement (mass/volume)            11 mg/dL   
         7-18        

 

 Serum or plasma creatinine measurement (mass/volume)            0.88 mg/dL    
        0.60-1.30        

 

 Serum or plasma urea nitrogen/creatinine mass ratio            13             
NRG        

 

 Serum or plasma creatinine measurement with calculation of estimated 
glomerular filtration rate            >             NRG        

 

 Serum or plasma glucose measurement (mass/volume)            319 mg/dL        
            

 

 Serum or plasma calcium measurement (mass/volume)            9.1 mg/dL        
    8.5-10.1        









 Methicillin resistant Staphylococcus aureus (MRSA) screening culture -  09:16         









 Methicillin resistant Staphylococcus aureus (MRSA) screening culture          
  NEG             NRG        









 CULTURE, URINE - 18 15:18         









 CULTURE, URINE, ROUTINE            SEE NOTE             NRG        



                                      



Encounters

      





 ACCT No.            Visit Date/Time            Discharge            Status    
        Pt. Type            Provider            Facility            Loc./Unit  
          Complaint        

 

 [id number]            2015 18:04:00            2015 23:59:59          
  CLS            Outpatient            ABILIO BIRMINGHAM                 
                              

 

 [id number]            2015 14:15:00            2015 23:59:59          
  CLS            Outpatient            MAO TY                  
                             

 

 [id number]            2014 15:56:00            2014 23:59:59          
  CLS            Outpatient            MOISES LSABILIO CHAVIRA SHARAN                 
                              

 

 [id number]            2014 12:59:00            2014 23:59:59          
  CLS            Outpatient            MOISES LSFAN, ABILIO R                 
                              

 

 [id number]            10/09/2014 14:03:00            10/09/2014 23:59:59          
  CLS            Outpatient            [facility]ADA [facility]                
                               

 

 [id number]            2014 16:01:00            2014 23:59:59          
  CLS            Outpatient            [facility]                
                               

 

 [id number]            2014 12:50:00            2014 23:59:59          
  CLS            Outpatient            [facility]                    
                           

 

 [id number]            2011 00:00:00            2011 23:59:59          
  CLS            Outpatient            DEISI NAYLOR DO                        
                       

 

 [id number]            2018 14:00:00            2018 23:59:59            
CLS            Outpatient            DIANE SAGASTUME LAC                         
[facility]                     

 

 [id number]            2018 14:00:00                                      
Document Registration                                                          
  

 

 2018 06:13:01            2018 23:59:59          
  CLS            Outpatient            GarrettMargi espinosa                  
                             

 

 [id number]            2017 10:00:00            2017 23:59:59    
        CLS            Preadmit            GUERA MURDOCK MD            [facility] 
[facility]            SDC            RIGHT KNEE TORN MEDIAL 
MENISCUS        

 

 [id number]            2017 08:58:00            2017 09:20:00    
        DIS            Outpatient            GUERA MURDOCK MD            
[facility]            PREOP            RIGHT KNEE TORN 
MEDICAL MENISCUS        

 

 [id number]            2017 11:42:00            2017 12:48:00    
        DIS            Emergency            ABHI ARCHULETA            [facility] 
[facility]            ER            WOUND CHECK        

 

 [id number]            2017 22:01:00            2017 23:23:00    
        DIS            Outpatient            ABHI ARCHULETA            [facility]            ER            VAGINAL BOIL        

 

 [id number]            2017 13:19:00            2017 23:59:59    
        CLS            Outpatient            GUERA MURDOCK MD            
[facility]            RAD            SYMPTOMATIC MEDIAL 
PICCA        

 

 [id number]            2017 14:31:00            2017 16:00:00    
        DIS            Emergency            ABHI ARCHULETA APRN            [facility]            ER            COLD SYMPTOMS        

 

 [id number]            2016 16:48:00            2016 19:00:00    
        DIS            Emergency            MAXI CAST DO            [facility]            ER            SORE ON STOMACH        

 

 [id number]            2016 12:59:00            2016 23:59:59    
        CLS            Outpatient            PATRICIOMITCH MARGI          
  [facility]            RAD            LOWER ABDOMINAL 
PAIN        

 

 [id number]            03/10/2016 14:07:00            03/10/2016 23:59:59    
        CLS            Outpatient            SAMANTHA CHAUDHARY            
[facility]            RAD            BILATERAL KNEE PAIN 
       

 

 [id number]            2014 17:18:00            2014 23:59:59    
        CLS            Outpatient                                              
              

 

 [id number]            2013 19:34:00            2013 23:59:59    
        CLS            Outpatient                                              
              

 

 [id number]            2016 11:47:00                                   
   Document Registration                                                       
     

 

 [id number]            2013 12:15:00                                   
   Document Registration                                                       
     

 

 [id number]            2012 08:34:00                                   
   Document Registration                                                       
     

 

 [id number]            2011 20:11:00                                   
   Document Registration                                                       
     

 

 [id number]            2011 22:52:00                                   
   Document Registration

## 2018-09-02 NOTE — XMS REPORT
[facility]

 Created on: 2018



Melanie Carroll

External Reference #: [id number]

: 2000

Sex: Female



Demographics







 Address  [address] [place]  [address]

 

 Preferred Language  Unknown

 

 Marital Status  Unknown

 

 Baptism Affiliation  Unknown

 

 Race  Unknown

 

 Ethnic Group  Unknown





Author







 Author  DEISI NAYLOR

 

 [facility]

 

 Address  [address] [place]  [address]



 

 Phone  [phone]







Care Team Providers







 Care Team Member Name  Role  Phone

 

 NAYLORDEISI  Unavailable  [phone]







PROBLEMS







 Type  Condition  ICD9-CM Code  REX03-EX Code  Onset Dates  Condition Status  
SNOMED Code

 

 Problem  Attention deficit disorder     F90.0     Active  276260833

 

 Problem  Mood disorder     F39     Active  61786675

 

 Problem  Depressive disorder, not elsewhere classified     F32.9     Active  
35642004

 

 Problem  Dysthymic disorder     F34.1     Active  40380232

 

 Problem  Anxiety disorder, unspecified     F41.9     Active  290223477

 

 Problem  Social phobia     F40.10     Active  21262386

 

 Problem  Other and unspecified noninfectious gastroenteritis and colitis  
558.9        Active  96076297







ALLERGIES







 Substance  Reaction  Event Type  Date  Status

 

 Rocephin  Unknown  Drug Allergy  15 [place], 2018  Active







ENCOUNTERS







 Encounter  Location  Date  Diagnosis

 

 [facility]  [id number] [place] [phone]
[phone]    Cystitis N30.90 and Dysuria R30.0

 

 [facility]  [id number] [place] [address] 
[id number]  15 [place], 2018  Dental examination Z01.20

 

 [facility]  [id number] [place] [phone]
[phone]  15 [place], 2018  Fever, unspecified fever cause R50.9 and Acute 
nasopharyngitis J00

 

 [facility]  [id number] [place] [phone]
[phone]  31 Aug, 2017  Mood disorder F39

 

 [facility]  [id number] [place] [address] [phone]
[phone]  21 Aug, 2017  Mood disorder F39

 

 [facility]  [id number] [place] [address] [phone]
[phone]  22 Mar, 2017  Depressive disorder, not elsewhere classified F32.9 and 
Social phobia F40.10

 

 [facility]  [id number] 
[phone]  22 Mar, 2016  Encounter for dental examination Z01.20

 

 [facility]  [address] [place] [address] [phone]
[phone]  29 Sep, 2015  Depressive disorder, not elsewhere classified 311 ; Anxiety 
state, unspecified 300.00 and Attention deficit disorder without mention of 
hyperactivity 314.00

 

 [facility]  [id number] [place] [address] [phone]
[phone]  01 Sep, 2015  Depressive disorder, not elsewhere classified 311 ; Anxiety 
state, unspecified 300.00 and Attention deficit disorder without mention of 
hyperactivity 314.00

 

 [facility]  [id number] [place] [address] [phone]
[phone]    Depressive disorder, not elsewhere classified 311

 

 [facility]  [id number] [place] [address] [phone]
[phone]  13 May, 2015  Depressive disorder, not elsewhere classified 311

 

 [facility]  [id number] [place] [address] [phone]
[phone]  30 2015   

 

 [facility]  [id number] [place] [address] [phone]
[phone]     

 

 [facility]  [id number] [place] [address] [phone]-
[phone]     

 

 [facility]  [id number] [place] [address] [phone]
[phone]  13 Mar, 2015   

 

 [facility]  [id number] [place] [address] [phone]
[phone]  13 Mar, 2015   

 

 [facility]  [id number] [place] [address] [phone]
[phone]     

 

 [facility]  [id number] [place] [address] [phone]
[phone]     

 

 [facility]  [id number] [place] [address] [phone]
[phone]  12 Dec, 2014   

 

 [facility]  [address] [facility] [id number] [place] [phone]-
0144  12 Dec, 2014   

 

 [facility]  [address] [id number] [place] [address]-
3098     

 

 [facility]  [address] [facility] [id number] [place] [address]-
0689     

 

 [facility]  [address] [facility] [id number] [place] [address]-
5030  09 Oct, 2014   

 

 [facility]  [address] [facility] [id number] [place] [phone]-
[phone]  09 Oct, 2014   

 

 [facility]  [address] [facility] [id number] [place] [phone]-
5057  22 Aug, 2014   

 

 [facility]  [address] [facility] [id number] [place] [address]773-
6108  22 Aug, 2014   

 

 [facility]  [address] [facility] [id number] [address]-
4386  10 Jul, 2014   

 

 [facility]  [address] [facility] [id number] [place] [address]-
0924  10 Jul, 2014   

 

 [facility]  [address] [facility] [id number][place] [address]-
1684  23 May, 2014   

 

 [facility]  [address] [facility] [id number] [address]-
1366  23 May, 2014   

 

 [facility]  [address] [facility] [id number][place] [address]-
6745     

 

 [facility]  [address] [facility] [id number][place] [address]-
6836  05 Dec, 2012   

 

 [facility]  [address] [facility] [id number][place] [address]-
8676  05 Dec, 2012   

 

 [facility]  [address] [facility] [id number] [address]-
3475  17 Dec, 2008   







IMMUNIZATIONS

No Known Immunizations



SOCIAL HISTORY

Never Assessed



REASON FOR VISIT

flu symptoms  Pt reports cough, fever and body aches for 2 days  MATTHIAS Luu, 
Pt was assessed by Med student and left the buliding before could be assessed 
by walk in provider 



PLAN OF CARE







 Activity  Details









  









 Follow Up  prn Reason:







VITAL SIGNS







 Height  64 in  2018-01-15

 

 Weight  204.6 lbs  2018-01-15

 

 Temperature  97.1 degrees Fahrenheit  2018-01-15

 

 Heart Rate  100 bpm  2018-01-15

 

 Respiratory Rate  22   2018-01-15

 

 BMI  35.12 kg/m2  2018-01-15

 

 Blood pressure systolic  112 mmHg  2018-01-15

 

 Blood pressure diastolic  68 mmHg  2018-01-15







MEDICATIONS







 Medication  Instructions  Dosage  Frequency  Start Date  End Date  Duration  
Status

 

 Zofran ODT 8 mg     1 tablet by Oral route every 8 hours PRN nausea or 
vomiting     13 Mar, 2015        Not-Taking

 

 Bentyl 10 mg     1 Capsule by Oral route 2 times per day PRN for tomach 
cramping     13 Mar, 2015        Not-Taking

 

 Kombiglyze XR     by oral route     13 Mar, 2015        Not-Taking

 

 Invokana     by oral route     13 Mar, 2015        Not-Taking

 

 NovoLog                    Not-Taking

 

 Lantus                    Not-Taking







RESULTS







 Name  Result  Date  Reference Range

 

 INFLUENZA A & B (IN HOUSE)     2018-01-15   

 

 INFLUENZA A  negative      

 

 INFLUENZA B  negative      

 

 Control  +      

 

 Lot #  [id number]      

 

 Exp date  [id number]      







PROCEDURES







 Procedure  Date Ordered  Result  Body Site

 

 INFLUENZA ASSAY W/OPTIC  [place] 15, 2018      







INSTRUCTIONS





MEDICATIONS ADMINISTERED

No Known Medications



MEDICAL (GENERAL) HISTORY







 Type  Description  Date

 

 Medical History  Diabetes-II   

 

 Surgical History  Tonsils removed  

 

 Surgical History  Blood infection

## 2018-09-02 NOTE — XMS REPORT
[facility]

 Created on: 2018



Melanie Carroll

External Reference #: [id number]

: 2000

Sex: Female



Demographics







 Address  [address] [place]  [address]

 

 Preferred Language  Unknown

 

 Marital Status  Unknown

 

 Christianity Affiliation  Unknown

 

 Race  Unknown

 

 Ethnic Group  Unknown





Author







 Author  GREGORY GUTIERREZ

 

 [facility]

 

 Address  Unknown

 

 Phone  [phone]







Care Team Providers







 Care Team Member Name  Role  Phone

 

 GREGORY GUTIERREZ  Unavailable  [phone]







PROBLEMS







 Type  Condition  ICD9-CM Code  KIU13-XR Code  Onset Dates  Condition Status  
SNOMED Code

 

 Problem  Attention deficit disorder     F90.0     Active  844969095

 

 Problem  Mood disorder     F39     Active  30159766

 

 Problem  Depressive disorder, not elsewhere classified     F32.9     Active  
71268590

 

 Problem  Dysthymic disorder     F34.1     Active  31426690

 

 Problem  Anxiety disorder, unspecified     F41.9     Active  559630593

 

 Problem  Social phobia     F40.10     Active  57835205

 

 Problem  Other and unspecified noninfectious gastroenteritis and colitis  
558.9        Active  45775739







ALLERGIES







 Substance  Reaction  Event Type  Date  Status

 

 Rocephin  Unknown  Drug Allergy  15 Raulito, 2018  Active







ENCOUNTERS







 Encounter  Location  Date  Diagnosis

 

 [facility]  [id number] [place] [phone]
[phone]     

 

 [facility]  [address] [address]-
8535     

 

 [facility]  [id number] [place] [address]-
9789    Cystitis N30.90 and Dysuria R30.0

 

 [facility]  [id number] [place] [address] 
[id number]  15 Raulito, 2018  Dental examination Z01.20

 

 [facility]  [id number] [place] [address] 21004
-[phone]  15 [place], 2018  Fever, unspecified fever cause R50.9 and Acute 
nasopharyngitis J00

 

 [facility]  [id number] [place] [address]-
9980  31 Aug, 2017  Mood disorder F39

 

 [facility]  [address] [phone]-
1597  21 Aug, 2017  Mood disorder F39

 

 [facility]  [address]00565100[place] [address]-
3508  22 Mar, 2017  Depressive disorder, not elsewhere classified F32.9 and 
Social phobia F40.10

 

 [facility]  [id number] 
[id number]  22 Mar, 2016  Encounter for dental examination Z01.20

 

 [facility]  [address] [place] [address]-
1947  29 Sep, 2015  Depressive disorder, not elsewhere classified 311 ; Anxiety 
state, unspecified 300.00 and Attention deficit disorder without mention of 
hyperactivity 314.00

 

 [facility]  [address] [place] [address] 929981-
3286  01 Sep, 2015  Depressive disorder, not elsewhere classified 311 ; Anxiety 
state, unspecified 300.00 and Attention deficit disorder without mention of 
hyperactivity 314.00

 

 [facility]  [address] [place] [address]-
7942    Depressive disorder, not elsewhere classified 311

 

 [facility]  [address] [place] [address]-
4731  13 May, 2015  Depressive disorder, not elsewhere classified 311

 

 [facility]  [address]00565100[place] [address]-
9084     

 

 [facility]  [address]00565100[place] [address]-
1452     

 

 [facility]  [address]00565100[place] [address]-
6011     

 

 [facility]  [address]00565100[place] [address]-
3359  13 Mar, 2015   

 

 [facility]  [address] [place] [address] 192656-
3816  13 Mar, 2015   

 

 [facility]  [address]00565100[place] [address]-
2649     

 

 [facility] [place] [facility]  [address] [facility] [id number] [place] [address]-
0236     

 

 [facility] [place] [facility]  [address] [id number] [place] [address]-
9141  12 Dec, 2014   

 

 [facility] [place] [facility]  [address] [id number] [place] [address]-
2406  12 Dec, 2014   

 

 [facility] [place] [facility]  [address] [id number] [place] [address]-
2521     

 

 [facility] [place] [facility]  [address] [id number] [place] [address]-
6870     

 

 [facility] [place] [facility]  [address] [id number] [place] [address]-
4027  09 Oct, 2014   

 

 [facility] [place] [facility]  [address] [id number] [place] [address]-
3561  09 Oct, 2014   

 

 [facility] [place] [facility]  [address] [id number] [place] [address]-
4347  22 Aug, 2014   

 

 [facility] [place] [facility]  [address] [id number] [place] [address]-
0450  22 Aug, 2014   

 

 [facility] [place] [facility]  [address] [id number] [place] [address]-
2053  10 Jul, 2014   

 

 [facility] [place] [facility]  [address] [id number] [place] [address]-
9649  10 Jul, 2014   

 

 [facility] [place] [facility]  [address] [id number] [place] [address]-
3618  23 May, 2014   

 

 [facility] [place] [facility]  [address] [id number] [place] [address]-
6503  23 May, 2014   

 

 [facility] [place] [facility]  [address] [id number] [place] [address]-
7077     

 

 [facility] [place] [facility]  [address] [id number] [place] [address]-
2546  05 Dec, 2012   

 

 [facility] [place] [facility]  [address] [id number] [place] [address]-
1116  05 Dec, 2012   

 

 [facility]  [address] [facility] [id number] [place] [address]-
7886  17 Dec, 2008   







IMMUNIZATIONS

No Known Immunizations



SOCIAL HISTORY

Never Assessed



REASON FOR VISIT

rafi



PLAN OF CARE







 Activity  Details









  









 Follow Up  prn Reason:CHELA wit hyg







VITAL SIGNS







 Blood pressure systolic  122 mmHg  2018-06-15

 

 Blood pressure diastolic  70 mmHg  2018-06-15







MEDICATIONS







 Medication  Instructions  Dosage  Frequency  Start Date  End Date  Duration  
Status

 

 Clindamycin HCl 150 MG  Orally every 6 hrs  2 capsules  6h        7 days  
Active

 

 NovoLog                    Not-Taking

 

 Invokana     by oral route     13 Mar, 2015        Not-Taking

 

 Lantus                    Not-Taking

 

 Zofran ODT 8 mg     1 tablet by Oral route every 8 hours PRN nausea or 
vomiting     13 Mar, 2015        Not-Taking

 

 Bentyl 10 mg     1 Capsule by Oral route 2 times per day PRN for tomach 
cramping     13 Mar, 2015        Not-Taking

 

 Kombiglyze XR     by oral route     13 Mar, 2015        Not-Taking







RESULTS

No Results



PROCEDURES







 Procedure  Date Ordered  Result  Body Site

 

 LTD ORAL EVALUATION - PROBLEM FOCUS  Kirsty 15, 2018      

 

 INTRAORL-PERIAPICAL 1 FILM 97118  Kirsty 15, 2018      

 

 BITEWING - SINGLE FILM  Kirsty 15, 2018      







INSTRUCTIONS





MEDICATIONS ADMINISTERED

No Known Medications



MEDICAL (GENERAL) HISTORY







 Type  Description  Date

 

 Medical History  Diabetes-II   

 

 Surgical History  Tonsils removed  

 

 Surgical History  Blood infection

## 2018-09-02 NOTE — XMS REPORT
[facility]

 Created on: 2018



Melanie Carroll

External Reference #: [id number]

: 2000

Sex: Female



Demographics







 Address  [address] [place]  [address]

 

 Preferred Language  Unknown

 

 Marital Status  Unknown

 

 Baptism Affiliation  Unknown

 

 Race  Unknown

 

 Ethnic Group  Unknown





Author







 Author  PRAMOD  DEANNE

 

 [facility]

 

 Address  [address] [place]  [address]



 

 Phone  [phone]







Care Team Providers







 Care Team Member Name  Role  Phone

 

 DEANNE BENAVIDEZ  Unavailable  [phone]







PROBLEMS







 Type  Condition  ICD9-CM Code  GII59-DQ Code  Onset Dates  Condition Status  
SNOMED Code

 

 Problem  Attention deficit disorder     F90.0     Active  130441843

 

 Problem  Mood disorder     F39     Active  62495953

 

 Problem  Depressive disorder, not elsewhere classified     F32.9     Active  
96390313

 

 Problem  Dysthymic disorder     F34.1     Active  58845966

 

 Problem  Anxiety disorder, unspecified     F41.9     Active  511292836

 

 Problem  Social phobia     F40.10     Active  56728533

 

 Problem  Other and unspecified noninfectious gastroenteritis and colitis  
558.9        Active  07983976







ALLERGIES







 Substance  Reaction  Event Type  Date  Status

 

 Rocephin  Unknown  Drug Allergy    Active







ENCOUNTERS







 Encounter  Location  Date  Diagnosis

 

 [facility] WALK [facility]  [address] [address]
-[phone]     

 

 [facility]  [address] [address]-
6362     

 

 [facility]  [id number] [place] [address]-
8425    Cystitis N30.90 and Dysuria R30.0

 

 [facility]  [id number] [place] [address] 
[id number]  15 Raulito, 2018  Dental examination Z01.20

 

 [facility]  [address] [address]
-6772  15 [place], 2018  Fever, unspecified fever cause R50.9 and Acute 
nasopharyngitis J00

 

 [facility]  [address] [address]-
8547  31 Aug, 2017  Mood disorder F39

 

 [facility]  [address]65100[place] [address]-
7358  21 Aug, 2017  Mood disorder F39

 

 [facility]  [id number] [place] [address]-
2018  22 Mar, 2017  Depressive disorder, not elsewhere classified F32.9 and 
Social phobia F40.10

 

 [facility]  [address]65[address], [place] 
[id number]  22 Mar, 2016  Encounter for dental examination Z01.20

 

 [facility]  [id number] [place] [address]-
4341  29 Sep, 2015  Depressive disorder, not elsewhere classified 311 ; Anxiety 
state, unspecified 300.00 and Attention deficit disorder without mention of 
hyperactivity 314.00

 

 [facility]  [id number] [place] [address] 8[address]57-
8247  01 Sep, 2015  Depressive disorder, not elsewhere classified 311 ; Anxiety 
state, unspecified 300.00 and Attention deficit disorder without mention of 
hyperactivity 314.00

 

 [facility]  [id number] [place] [address]-
5817    Depressive disorder, not elsewhere classified 311

 

 [facility]  [id number] [place] [address] 79926-
8418  13 May, 2015  Depressive disorder, not elsewhere classified 311

 

 [facility]  [id number] [place] [address]-
7200  30 2015   

 

 [facility]  [id number] [place] [address] 16[address]-
5329     

 

 [facility]  [address] [place] [address] 9[address]-
9138     

 

 [facility]  [id number] [place] [address] 885[address]-
7856  13 Mar, 2015   

 

 [facility]  [id number] [place] [address] 37777-
8458  13 Mar, 2015   

 

 [facility]  [id number] [place] [address] 03283-
7519     

 

 [facility] [place] [facility]  [address] [id number] [place] [address]-
7143     

 

 [facility] [place] [facility]  [address] [id number] [place] [address]-
1192  12 Dec, 2014   

 

 [facility] [place] [facility]  [address] [id number] [place]-
9056  12 Dec, 2014   

 

 [facility] [place] [facility]  [address] [id number] [place] [address]-
6331     

 

 [facility] [place] [facility]  [address] [id number] [place] [address]-
4406     

 

 [facility] [place] [facility]  [address] [id number] [place] [address]-
9035  09 Oct, 2014   

 

 [facility] [place] [facility]  [address] [id number] [place] [address]-
0486  09 Oct, 2014   

 

 [facility] [place] [facility]  [address] [id number] [place] [address]-
6010  22 Aug, 2014   

 

 [facility] [place] [facility]  [address] [id number] [place] [address]-
9418  22 Aug, 2014   

 

 [facility] [place] [facility]  [address] [id number] [place] [address]-
1656  10 Jul, 2014   

 

 [facility] [place] [facility]  [address] [id number] [place] [address]-
0338  10 Jul, 2014   

 

 [facility] [place] [facility]  [address] [id number] [address]-
3603  23 May, 2014   

 

 [facility] [place] [facility]  [address] [id number] [place] [address]-
9871  23 May, 2014   

 

 [facility] [place] [facility]  [address] [id number] [place] [address]-
7954     

 

 [facility] [place] [facility]  [address] [id number] [place] [address]-
8075  05 Dec, 2012   

 

 [facility] [place] [facility]  [address] [facility] [id number] [place] [address]-
4516  05 Dec, 2012   

 

 [facility]  [address] [facility] [id number] [place] [address]-
6182  17 Dec, 2008   







IMMUNIZATIONS

No Known Immunizations



SOCIAL HISTORY

Never Assessed



REASON FOR VISIT

kidney infection, possible--tcuppettRn, Dysuria and decreased urination for 
past 3 days



PLAN OF CARE







 Activity  Details









  









 Follow Up  2 Weeks if not better Reason:UTI







VITAL SIGNS







 Height  64 in  2018

 

 Weight  200.4 lbs  2018

 

 Temperature  98.1 degrees Fahrenheit  2018

 

 Heart Rate  90 bpm  2018

 

 Respiratory Rate  18   2018

 

 BMI  34.39 kg/m2  2018

 

 Blood pressure systolic  122 mmHg  2018

 

 Blood pressure diastolic  70 mmHg  2018







MEDICATIONS







 Medication  Instructions  Dosage  Frequency  Start Date  End Date  Duration  
Status

 

 Pyridium 200 mg  Orally Three times a day  1 tablet after meals  8h    03 days  Active

 

 Macrobid 100 mg  Orally every 12 hrs  1 capsule with food  12h    7 day(s)  Active







RESULTS

No Results



PROCEDURES







 Procedure  Date Ordered  Result  Body Site

 

 URINALYSIS, AUTO, W/O SCOPE  2018      

 

 URINE CULTURE/COLONY COUNT  2018      







INSTRUCTIONS





MEDICATIONS ADMINISTERED

No Known Medications



MEDICAL (GENERAL) HISTORY







 Type  Description  Date

 

 Medical History  Diabetes-II   

 

 Surgical History  Tonsils removed  

 

 Surgical History  Blood infection

## 2018-09-02 NOTE — XMS REPORT
[facility]

 Created on: 2018



Melanie Carroll

External Reference #: [id number]

: 2000

Sex: Female



Demographics







 Address  [address] [place]  [address]

 

 Preferred Language  Unknown

 

 Marital Status  Unknown

 

 [employer]ar[employer] Affiliation  Unknown

 

 Race  Unknown

 

 Ethnic Group  Unknown





Author







 Author  PRAMOD  DEANNE

 

 Organization  [facility]

 

 Address  [address] [place]  [address]



 

 Phone  [phone]







Care Team Providers







 Care Team Member Name  Role  Phone

 

 DEANNE BENAVIDEZ  Unavailable  [phone]







PROBLEMS







 Type  Condition  ICD9-CM Code  ISW88-UX Code  Onset Dates  Condition Status  
SNOMED Code

 

 Problem  Attention deficit disorder     F90.0     Active  735325888

 

 Problem  Mood disorder     F39     Active  23336951

 

 Problem  Depressive disorder, not elsewhere classified     F32.9     Active  
16121951

 

 Problem  Dysthymic disorder     F34.1     Active  03455971

 

 Problem  Anxiety disorder, unspecified     F41.9     Active  994219905

 

 Problem  Social phobia     F40.10     Active  44201836

 

 Problem  Other and unspecified noninfectious gastroenteritis and colitis  
558.9        Active  42547075







ALLERGIES

No Information



ENCOUNTERS







 Encounter  Location  Date  Diagnosis

 

 [facility] WALK IN [facility]  [address] [address]
-[address]     

 

 [facility]  [address] [address]-
2409     

 

 [facility]  [address] [address]-
0926  20 2018  Cystitis N30.90 and Dysuria R30.0

 

 [facility]  [id number] [place] [address] 
[id number]  15 2018  Dental examination Z01.20

 

 [facility]  [address] [address]
-[address]  15 2018  Fever, unspecified fever cause R50.9 and Acute 
nasopharyngitis J00

 

 [facility]  [address] [address]-
2322  31 Aug, 2017  Mood disorder F39

 

 [facility]  [address] [address]-
2423  21 Aug, 2017  Mood disorder F39

 

 [facility]  [address]65100[place] [address]-
9254  22 Mar, 2017  Depressive disorder, not elsewhere classified F32.9 and 
Social phobia F40.10

 

 [facility]  [id number], [place] 
[id number]  22 Mar, 2016  Encounter for dental examination Z01.20

 

 [facility]  [id number] [place] [address] [phone]-
6491  29 Sep, 2015  Depressive disorder, not elsewhere classified 311 ; Anxiety 
state, unspecified 300.00 and Attention deficit disorder without mention of 
hyperactivity 314.00

 

 [facility]  [id number] [place] [address] 550525-
1445  01 Sep, 2015  Depressive disorder, not elsewhere classified 311 ; Anxiety 
state, unspecified 300.00 and Attention deficit disorder without mention of 
hyperactivity 314.00

 

 [facility]  [address] [place] [address]-
8672    Depressive disorder, not elsewhere classified 311

 

 [facility]  [id number] [place] [address] [id number]-
1621  13 May, 2015  Depressive disorder, not elsewhere classified 311

 

 [facility]  [id number] [place] [address] 885391-
8812     

 

 [facility]  [address]65100[place], [place] [address]-
7729     

 

 [facility]  [address] [place] [address] [id number]-
0411     

 

 [facility]  [address] [place] [address] [id number]-
2728  13 Mar, 2015   

 

 [facility]  [id number] [place] [address] 672356-
4858  13 Mar, 2015   

 

 [facility]  [address]65100[place] [address]352-
3073     

 

 [facility]  [id number] [place] [place] [address]-
1664     

 

 [facility]  [address] [id number] [place] [address]-
6983  12 Dec, 2014   

 

 [facility] [place] [facility]  [address] [id number] [place] [address]-
0763  12 Dec, 2014   

 

 [facility]  [address] [id number] [place] [address]-
7702     

 

 [facility] [place] [facility]  [address] [id number] [place] [address]-
6430     

 

 [facility]  [address] [id number] [place] [address]-
1981  09 Oct, 2014   

 

 [facility] [place] [facility]  [address] [id number] [place] [address]-
1696  09 Oct, 2014   

 

 [facility] [place] [facility]  [address] [id number] [place] [address]-
2230  22 Aug, 2014   

 

 [facility] [place] [facility]  [address] [id number] [place] [address]-
5379  22 Aug, 2014   

 

 [facility] [place] [facility]  [address] [id number] [place] [address]-
8835  10 Jul, 2014   

 

 [facility]  [address] [id number] [place] [address]-
9822  10 Jul, 2014   

 

 [facility] [place] [facility]  [address] [id number] [place] [address]-
2826  23 May, 2014   

 

 [facility] [place] [facility]  [address] [id number] [place] [address]-
6624  23 May, 2014   

 

 [facility] [place] [facility]  [address] [id number] [place] [address]-
0031     

 

 [facility] [place] [facility]  [address] [id number] [place] [address]-
4956  05 Dec, 2012   

 

 [facility] [place] [facility]  [address] [id number] [place] [address]-
8418  05 Dec, 2012   

 

 [facility]  [address] [facility] [id number] [place] [address]-
3547  17 Dec, 2008   







IMMUNIZATIONS

No Known Immunizations



SOCIAL HISTORY

Never Assessed



REASON FOR VISIT

Requests return call



PLAN OF CARE





VITAL SIGNS





MEDICATIONS

Unknown Medications



RESULTS

No Results



PROCEDURES

No Known procedures



INSTRUCTIONS





MEDICATIONS ADMINISTERED

No Known Medications



MEDICAL (GENERAL) HISTORY







 Type  Description  Date

 

 Medical History  Diabetes-II   

 

 Surgical History  Tonsils removed  

 

 Surgical History  Blood infection

## 2018-09-02 NOTE — XMS REPORT
[facility]

 Created on: 10/08/2017



Melanie Carroll

External Reference #: [id number]

: 2000

Sex: Female



Demographics







 Address  [address] 

[place]  [address]

 

 Preferred Language  Unknown

 

 Marital Status  Unknown

 

 Sabianist Affiliation  Unknown

 

 Race  Unknown

 

 Ethnic Group  Unknown





Author







 Author  ISIAH DE LEON

 

 Organization  [facility]

 

 Address  [address] [place]  [address]



 

 Phone  [phone]







Care Team Providers







 Care Team Member Name  Role  Phone

 

 ISIAH DE LEON  Unavailable  [phone]







PROBLEMS







 Type  Condition  ICD9-CM Code  BKI88-PK Code  Onset Dates  Condition Status  
SNOMED Code

 

 Problem  Attention deficit disorder     F90.0     Active  525554808

 

 Problem  Mood disorder     F39     Active  77373708

 

 Problem  Depressive disorder, not elsewhere classified     F32.9     Active  
14042345

 

 Problem  Dysthymic disorder     F34.1     Active  90220292

 

 Problem  Anxiety disorder, unspecified     F41.9     Active  109647178

 

 Problem  Social phobia     F40.10     Active  35889181

 

 Problem  Other and unspecified noninfectious gastroenteritis and colitis  
558.9        Active  06328316







ALLERGIES

No Information



SOCIAL HISTORY

Never Assessed



PLAN OF CARE







 Activity  Details









  









 Follow Up  next available Reason:anger







VITAL SIGNS





MEDICATIONS

Unknown Medications



RESULTS

No Results



PROCEDURES







 Procedure  Date Ordered  Result  Body Site

 

 Psychotherapy, patient &/family, 30 minutes, established patient  2017      







IMMUNIZATIONS

No Known Immunizations



MEDICAL (GENERAL) HISTORY







 Type  Description  Date

 

 Medical History  Diabetes-II   

 

 Surgical History  Tonsils removed  

 

 Surgical History  Blood infection

## 2019-03-17 ENCOUNTER — HOSPITAL ENCOUNTER (EMERGENCY)
Dept: HOSPITAL 75 - ER | Age: 19
Discharge: HOME | End: 2019-03-17
Payer: SELF-PAY

## 2019-03-17 VITALS — DIASTOLIC BLOOD PRESSURE: 94 MMHG | SYSTOLIC BLOOD PRESSURE: 131 MMHG

## 2019-03-17 VITALS — HEIGHT: 65 IN | BODY MASS INDEX: 33.32 KG/M2 | WEIGHT: 200 LBS

## 2019-03-17 DIAGNOSIS — Z87.440: ICD-10-CM

## 2019-03-17 DIAGNOSIS — Z90.89: ICD-10-CM

## 2019-03-17 DIAGNOSIS — Z87.19: ICD-10-CM

## 2019-03-17 DIAGNOSIS — Z88.1: ICD-10-CM

## 2019-03-17 DIAGNOSIS — Z98.890: ICD-10-CM

## 2019-03-17 DIAGNOSIS — Z77.22: ICD-10-CM

## 2019-03-17 DIAGNOSIS — N39.0: Primary | ICD-10-CM

## 2019-03-17 DIAGNOSIS — E11.9: ICD-10-CM

## 2019-03-17 LAB
ALBUMIN SERPL-MCNC: 4.3 GM/DL (ref 3.2–4.5)
ALP SERPL-CCNC: 70 U/L (ref 40–136)
ALT SERPL-CCNC: 16 U/L (ref 0–55)
AMYLASE SERPL-CCNC: 36 U/L (ref 25–125)
APTT PPP: YELLOW S
BACTERIA #/AREA URNS HPF: (no result) /HPF
BASOPHILS # BLD AUTO: 0 10^3/UL (ref 0–0.1)
BASOPHILS NFR BLD AUTO: 0 % (ref 0–10)
BILIRUB SERPL-MCNC: 0.7 MG/DL (ref 0.1–1)
BILIRUB UR QL STRIP: NEGATIVE
BUN/CREAT SERPL: 8
CALCIUM SERPL-MCNC: 9.6 MG/DL (ref 8.5–10.1)
CHLORIDE SERPL-SCNC: 102 MMOL/L (ref 98–107)
CO2 SERPL-SCNC: 22 MMOL/L (ref 21–32)
CREAT SERPL-MCNC: 0.87 MG/DL (ref 0.6–1.3)
EOSINOPHIL # BLD AUTO: 0 10^3/UL (ref 0–0.3)
EOSINOPHIL NFR BLD AUTO: 0 % (ref 0–10)
ERYTHROCYTE [DISTWIDTH] IN BLOOD BY AUTOMATED COUNT: 12.7 % (ref 10–14.5)
FIBRINOGEN PPP-MCNC: (no result) MG/DL
GFR SERPLBLD BASED ON 1.73 SQ M-ARVRAT: > 60 ML/MIN
GLUCOSE SERPL-MCNC: 315 MG/DL (ref 70–105)
GLUCOSE UR STRIP-MCNC: (no result) MG/DL
HCT VFR BLD CALC: 42 % (ref 35–52)
HGB BLD-MCNC: 14.6 G/DL (ref 11.5–16)
KETONES UR QL STRIP: (no result)
LEUKOCYTE ESTERASE UR QL STRIP: (no result)
LIPASE SERPL-CCNC: 18 U/L (ref 8–78)
LYMPHOCYTES # BLD AUTO: 2.8 X 10^3 (ref 1–4)
LYMPHOCYTES NFR BLD AUTO: 24 % (ref 12–44)
MANUAL DIFFERENTIAL PERFORMED BLD QL: NO
MCH RBC QN AUTO: 31 PG (ref 25–34)
MCHC RBC AUTO-ENTMCNC: 35 G/DL (ref 32–36)
MCV RBC AUTO: 88 FL (ref 80–99)
MONOCYTES # BLD AUTO: 1.1 X 10^3 (ref 0–1)
MONOCYTES NFR BLD AUTO: 9 % (ref 0–12)
NEUTROPHILS # BLD AUTO: 7.6 X 10^3 (ref 1.8–7.8)
NEUTROPHILS NFR BLD AUTO: 66 % (ref 42–75)
NITRITE UR QL STRIP: POSITIVE
PH UR STRIP: 5 [PH] (ref 5–9)
PLATELET # BLD: 381 10^3/UL (ref 130–400)
PMV BLD AUTO: 9.8 FL (ref 7.4–10.4)
POTASSIUM SERPL-SCNC: 3.6 MMOL/L (ref 3.6–5)
PROT SERPL-MCNC: 7.1 GM/DL (ref 6.4–8.2)
PROT UR QL STRIP: (no result)
SODIUM SERPL-SCNC: 137 MMOL/L (ref 135–145)
SP GR UR STRIP: 1.02 (ref 1.02–1.02)
SQUAMOUS #/AREA URNS HPF: (no result) /HPF
UROBILINOGEN UR-MCNC: NORMAL MG/DL
WBC # BLD AUTO: 11.4 10^3/UL (ref 4.3–11)
WBC #/AREA URNS HPF: (no result) /HPF

## 2019-03-17 PROCEDURE — 74177 CT ABD & PELVIS W/CONTRAST: CPT

## 2019-03-17 PROCEDURE — 87077 CULTURE AEROBIC IDENTIFY: CPT

## 2019-03-17 PROCEDURE — 87186 SC STD MICRODIL/AGAR DIL: CPT

## 2019-03-17 PROCEDURE — 96372 THER/PROPH/DIAG INJ SC/IM: CPT

## 2019-03-17 PROCEDURE — 85025 COMPLETE CBC W/AUTO DIFF WBC: CPT

## 2019-03-17 PROCEDURE — 96360 HYDRATION IV INFUSION INIT: CPT

## 2019-03-17 PROCEDURE — 84703 CHORIONIC GONADOTROPIN ASSAY: CPT

## 2019-03-17 PROCEDURE — 36415 COLL VENOUS BLD VENIPUNCTURE: CPT

## 2019-03-17 PROCEDURE — 96361 HYDRATE IV INFUSION ADD-ON: CPT

## 2019-03-17 PROCEDURE — 80053 COMPREHEN METABOLIC PANEL: CPT

## 2019-03-17 PROCEDURE — 82962 GLUCOSE BLOOD TEST: CPT

## 2019-03-17 PROCEDURE — 83690 ASSAY OF LIPASE: CPT

## 2019-03-17 PROCEDURE — 82150 ASSAY OF AMYLASE: CPT

## 2019-03-17 PROCEDURE — 87088 URINE BACTERIA CULTURE: CPT

## 2019-03-17 PROCEDURE — 81000 URINALYSIS NONAUTO W/SCOPE: CPT

## 2019-03-17 NOTE — ED ABDOMINAL PAIN
General


Stated Complaint:  STOMACH PAIN/SOB


Source of Information:  Patient


Exam Limitations:  No Limitations





History of Present Illness


Date Seen by Provider:  Mar 17, 2019


Time Seen by Provider:  18:45


Initial Comments


18-year-old female who presents to the emergency room with complaints of right 

upper quadrant abdominal pain that started yesterday. She also reports nausea 

vomiting. She is a known type II diabetic that has required insulin the past 

but reports she has not taken insulin for the past 2 months.


Timing/Duration:  1-2 Days


Severity/Quality:  Sharp


Location:  [facility]


Radiation:  No Radiation


Associated Symptoms:  Nausea/Vomiting





Allergies and Home Medications


Allergies


Coded Allergies:  


     ceftriaxone (Unverified  Allergy, Mild, HIVES, 8/30/10)





Home Medications


Hydrocodone Bit/Acetaminophen 1 Tab Tab, 1 EACH PO Q6H PRN for PAIN


   Prescribed by: NAEL SANTOS on 18


Hydrocodone Bit/Acetaminophen 1 Tab Tab, 1 EACH PO Q4-6HR PRN for PAIN-MODERATE


   Prescribed by: NAEL SANTOS on 3/17/19 2109


Nitrofurantoin Monohyd/M-Cryst 100 Mg Capsule, 1 TAB PO BID


   Prescribed by: NAEL SANTOS on 3/17/19 2109


Ondansetron HCl 4 Mg Tab, 4 MG PO Q4H PRN for NAUSEA/VOMITING


   Prescribed by: NAEL SANTOS on 3/17/19 2109


Sulfamethoxazole/Trimethoprim 1 Each Tablet, 1 EACH PO BID


   Prescribed by: NAEL SANTOS on 18





Patient Home Medication List


Home Medication List Reviewed:  Yes





Review of Systems


Review of Systems


Constitutional:  see HPI; No chills, No fever


Gastrointestinal:  See HPI, Abdominal Pain (right upper quadrant), Nausea, 

Vomiting





All Other Systems Reviewed


Negative Unless Noted:  Yes





Past Medical-Social-Family Hx


Past Med/Social Hx:  Reviewed Nursing Past Med/Soc Hx


Patient Social History


Type Used:  Cigarettes


2nd Hand Smoke Exposure:  Yes


Recent Foreign Travel:  No


Contact w/Someone Who Travel:  No


Recent Hopitalizations:  No





Immunizations Up To Date


Tetanus Booster (TDap):  Less than 5yrs


PED Vaccines UTD:  No


Date of Pneumonia Vaccine:  2013


Date of Influenza Vaccine:  2013





Seasonal Allergies


Seasonal Allergies:  Yes





Past Medical History


Surgeries:  Yes (T&A, MULTIPLE ABSCESS I&D'S, "BLADDER STRETCHED" WHEN YOUNGER)


Adenoidectomy, Bladder Surgery, Tonsillectomy


Respiratory:  No


Cardiac:  No


Neurological:  No


Female Reproductive Disorders:  Denies


Genitourinary:  Yes


UTI-Chronic


Gastrointestinal:  Yes (Chronic constipation)


Chronic Constipation


Musculoskeletal:  No


Endocrine:  No


Diabetes, Insulin dep


HEENT:  No


Cancer:  No


Psychosocial:  No


Integumentary:  Yes


Blood Disorders:  No





Family Medical History


Reviewed Nursing Family Hx





Physical Exam


Vital Signs





Vital Signs - First Documented








 3/17/19





 18:24


 


Temp 99.8


 


Pulse 122


 


Resp 23


 


B/P (MAP) 133/93 (106)


 


Pulse Ox 97


 


O2 Delivery Room Air





Capillary Refill :


Height/Weight/BMI


Height: 5'6.00"


Weight: 207lbs. 0oz. [id number]; 28.12 BMI


Method:Stated


General Appearance:  WD/WN, no apparent distress


Respiratory:  chest non-tender, lungs clear, normal breath sounds, no 

respiratory distress, no accessory muscle use


Cardiovascular:  normal peripheral pulses, regular rate, rhythm, no edema, no 

gallop, no JVD, no murmur


Neurologic/Psychiatric:  alert, normal mood/affect, oriented x 3


Skin:  normal color, warm/dry





Progress/Results/Core Measures


Results/Orders


Lab Results





Laboratory Tests








Test


 3/17/19


18:25 3/17/19


19:00 3/17/19


21:02 Range/Units


 


 


Urine Color YELLOW     


 


Urine Clarity VERY CLOUDY H    


 


Urine pH 5    5-9  


 


Urine Specific Gravity 1.025 H   1.016-1.022  


 


Urine Protein 2+ H   NEGATIVE  


 


Urine Glucose (UA) 4+ H   NEGATIVE  


 


Urine Ketones 1+ H   NEGATIVE  


 


Urine Nitrite POSITIVE H   NEGATIVE  


 


Urine Bilirubin NEGATIVE    NEGATIVE  


 


Urine Urobilinogen NORMAL    NORMAL  MG/DL


 


Urine Leukocyte Esterase 3+ H   NEGATIVE  


 


Urine RBC (Auto) 3+ H   NEGATIVE  


 


Urine RBC 10-25 H    /HPF


 


Urine WBC TNTC H    /HPF


 


Urine Squamous Epithelial


Cells NONE 


 


 


  /HPF





 


Urine Crystals NONE     /LPF


 


Urine Bacteria LARGE H    /HPF


 


Urine Casts NONE     /LPF


 


Urine Mucus NEGATIVE     /LPF


 


Urine Culture Indicated YES     


 


White Blood Count


 


 11.4 H


 


 4.3-11.0


10^3/uL


 


Red Blood Count


 


 4.78 


 


 4.35-5.85


10^6/uL


 


Hemoglobin  14.6   11.5-16.0  G/DL


 


Hematocrit  42   35-52  %


 


Mean Corpuscular Volume  88   80-99  FL


 


Mean Corpuscular Hemoglobin  31   25-34  PG


 


Mean Corpuscular Hemoglobin


Concent 


 35 


 


 32-36  G/DL





 


Red Cell Distribution Width  12.7   10.0-14.5  %


 


Platelet Count


 


 381 


 


 130-400


10^3/uL


 


Mean Platelet Volume  9.8   7.4-10.4  FL


 


Neutrophils (%) (Auto)  66   42-75  %


 


Lymphocytes (%) (Auto)  24   12-44  %


 


Monocytes (%) (Auto)  9   0-12  %


 


Eosinophils (%) (Auto)  0   0-10  %


 


Basophils (%) (Auto)  0   0-10  %


 


Neutrophils # (Auto)  7.6   1.8-7.8  X 10^3


 


Lymphocytes # (Auto)  2.8   1.0-4.0  X 10^3


 


Monocytes # (Auto)  1.1 H  0.0-1.0  X 10^3


 


Eosinophils # (Auto)


 


 0.0 


 


 0.0-0.3


10^3/uL


 


Basophils # (Auto)


 


 0.0 


 


 0.0-0.1


10^3/uL


 


Sodium Level  137   135-145  MMOL/L


 


Potassium Level  3.6   3.6-5.0  MMOL/L


 


Chloride Level  102     MMOL/L


 


Carbon Dioxide Level  22   21-32  MMOL/L


 


Anion Gap  13   5-14  MMOL/L


 


Blood Urea Nitrogen  7   7-18  MG/DL


 


Creatinine


 


 0.87 


 


 0.60-1.30


MG/DL


 


Estimat Glomerular Filtration


Rate 


 > 60 


 


  





 


BUN/Creatinine Ratio  8    


 


Glucose Level  315 H    MG/DL


 


Calcium Level  9.6   8.5-10.1  MG/DL


 


Corrected Calcium  9.4   8.5-10.1  MG/DL


 


Total Bilirubin  0.7   0.1-1.0  MG/DL


 


Aspartate Amino Transf


(AST/SGOT) 


 10 


 


 5-34  U/L





 


Alanine Aminotransferase


(ALT/SGPT) 


 16 


 


 0-55  U/L





 


Alkaline Phosphatase  70     U/L


 


Total Protein  7.1   6.4-8.2  GM/DL


 


Albumin  4.3   3.2-4.5  GM/DL


 


Amylase Level  36     U/L


 


Lipase  18   8-78  U/L


 


Serum Pregnancy Test,


Qualitative 


 NEGATIVE 


 


 NEGATIVE  





 


Glucometer   245 H   MG/DL








Micro Results





Microbiology


3/17/19 Urine Culture - Final, Complete


          Escherichia coli





My Orders





Orders - NAEL SANTOS


Ua Culture If Indicated (3/17/19 18:33)


Comprehensive Metabolic Panel (3/17/19 18:44)


Lipase (3/17/19 18:44)


Amylase (3/17/19 18:44)


Hcg,Qualitative Serum (3/17/19 18:44)


Saline Lock/Iv-Start (3/17/19 18:44)


Cbc With Automated Diff (3/17/19 18:44)


Ct Abdomen/Pelvis W (3/17/19 18:44)


Iohexol Injection (Omnipaque 350 Mg/Ml 1 (3/17/19 19:00)


Received Contrast (Contrast Received) (3/17/19 19:00)


Sodium Chloride Flush (Catheter Flush Sy (3/17/19 19:00)


Ns (Ivpb) (Sodium Chloride 0.9% Ivpb Bag (3/17/19 19:00)


Urine Culture (3/17/19 18:25)


Ns Iv 1000 Ml (Sodium Chloride 0.9%) (3/17/19 19:45)


Insulin (Regular) Human (Humulin R (Per (3/17/19 19:45)


Hydrocodone/Apap 5/325 Tablet (Lortab 5 (3/17/19 20:30)


Accucheck Stat ONCE (3/17/19 20:45)


Nitrofurantoin Capsule,Macro (Macrobid C (3/17/19 21:45)


Rx-Hydrocodone/Apap 5-325 Mg (Rx-Vicodin (3/17/19 21:45)


Rx-Ondansetron Po (Rx-Zofran Po) (3/17/19 21:32)


Im/Sub-Q Injection Non-Ab Ed (3/17/19 )





Medications Given in ED





Vital Signs/I&O











 3/17/19 3/17/19





 18:24 22:44


 


Temp 99.8 98.8


 


Pulse 122 95


 


Resp 23 20


 


B/P (MAP) 133/93 (106) 131/94 (106)


 


Pulse Ox 97 99


 


O2 Delivery Room Air Room Air











Progress


Progress Note :  


   Time:  21:00


Progress Note


I have seen and evaluated the patient. I've informed her of her laboratory and 

imaging studies. I stressed the importance of following up with her primary 

care provider to further evaluate her right upper quadrant pain with a possible 

ultrasound of her gallbladder. We were unable to ultrasound tonight due to no 

coverage. She has had improvement of her pain and nausea. She was also 

instructed to follow-up to get better medical management of her diabetes. She 

agrees with plan of care, plans for discharge, return precautions were given.





Diagnostic Imaging





   Diagonstic Imaging:  CT


   Plain Films/CT/US/NM/MRI:  abdomen, pelvis


Comments


NAME:      PONCE SINGH


[facility] REC#:   [id number]


ACCOUNT#:   [id number]


PT STATUS:   REG ER


:      2000


PHYSICIAN:    NAEL SANTOS


ADMIT DATE:   19/ER


***Signed***


Date of Exam:   19





CT ABDOMEN/PELVIS W


 





PROCEDURE: CT abdomen and pelvis with contrast.





TECHNIQUE: Multiple contiguous axial images were obtained through


the abdomen and pelvis after administration of intravenous


contrast. 





INDICATION: Right upper quadrant pain and loss of appetite.





COMPARISON: 2016.





FINDINGS: The heart size is normal. The lung bases are clear.


There appears to be some mild fatty infiltration of the liver.


Gallbladder is unremarkable. There is no biliary ductal


dilatation. There are no focal liver lesions. Spleen is normal.


The pancreas and adrenal glands are unremarkable. The kidneys are


normal in appearance. The aorta is nonaneurysmal. There is no


free air. There is no ascites. There is no focal inflammatory


change. Bowel gas pattern is nonspecific. Appendix is normal.


There appears to be a small bilateral ovarian cyst. Bladder is


normal. There is no pelvic mass, adenopathy or free fluid.


Osseous structures are unremarkable.





IMPRESSION:


1. Mild fatty infiltration of the liver.


2. Probable bilateral ovarian cyst.


3. No other acute abnormality in the abdomen or pelvis. 





Dictated by: 





  Dictated on workstation # [id number]





[id number]





Dict:      19 1948


Trans:      19





Interpreted by:         BHUPINDER KUMAR MD


Electronically signed by:   BUHPINDER KUMAR MD 19


   Reviewed:  Reviewed by Me





Departure


Impression





 Primary Impression:  


 [facility] abdominal pain


 Additional Impression:  


 UTI (urinary tract infection)


Disposition:   HOME, SELF-CARE


Condition:  Stable/Unchanged





Departure-Patient Inst.


Decision time for Depature:  21:04


Referrals:  


[facility] (PCP/Family)


Primary Care Physician


Patient Instructions:  Acute Abdomen (Belly Pain), Adult (DC), Urinary Tract 

Infection, Adult (DC)





Add. Discharge Instructions:  


Take medications as directed. Call first thing tomorrow morning to schedule an 

appointment for medical management of your diabetes and to further evaluate 

your right upper quadrant pain. You may benefit from an ultrasound of your 

gallbladder to further evaluate gall stones. Return back to the emergency room 

for worsening symptoms or concerns as needed.


Scripts


Nitrofurantoin Monohyd/M-Cryst (Macrobid 100 mg Capsule) 100 Mg Capsule


1 TAB PO BID for 7 Days, #14 CAP


   Prov: NAEL SANTOS         3/17/19 


Ondansetron HCl (Zofran) 4 Mg Tab


4 MG PO Q4H PRN for NAUSEA/VOMITING, #14 TAB


   Prov: NAEL SANTOS         3/17/19 


Hydrocodone Bit/Acetaminophen (Hydrocodone/Acetaminophen 5/325mg Tablet) 1 Tab 

Tab


1 EACH PO Q4-6HR PRN for PAIN-MODERATE MDD 10, #10 TAB


   Prov: NAEL SANTOS         3/17/19











NAEL SANTOS Mar 17, 2019 18:49

## 2019-03-17 NOTE — DIAGNOSTIC IMAGING REPORT
PROCEDURE: CT abdomen and pelvis with contrast.



TECHNIQUE: Multiple contiguous axial images were obtained through

the abdomen and pelvis after administration of intravenous

contrast. 



INDICATION: Right upper quadrant pain and loss of appetite.



COMPARISON: 5/27/2016.



FINDINGS: The heart size is normal. The lung bases are clear.

There appears to be some mild fatty infiltration of the liver.

Gallbladder is unremarkable. There is no biliary ductal

dilatation. There are no focal liver lesions. Spleen is normal.

The pancreas and adrenal glands are unremarkable. The kidneys are

normal in appearance. The aorta is nonaneurysmal. There is no

free air. There is no ascites. There is no focal inflammatory

change. Bowel gas pattern is nonspecific. Appendix is normal.

There appears to be a small bilateral ovarian cyst. Bladder is

normal. There is no pelvic mass, adenopathy or free fluid.

Osseous structures are unremarkable.



IMPRESSION:

1. Mild fatty infiltration of the liver.

2. Probable bilateral ovarian cyst.

3. No other acute abnormality in the abdomen or pelvis. 



Dictated by: 



  Dictated on workstation # [id number]

## 2020-06-13 ENCOUNTER — HOSPITAL ENCOUNTER (EMERGENCY)
Dept: HOSPITAL 75 - ER | Age: 20
Discharge: HOME | End: 2020-06-13
Payer: SELF-PAY

## 2020-06-13 VITALS — WEIGHT: 199.3 LBS | BODY MASS INDEX: 35.31 KG/M2 | HEIGHT: 62.99 IN

## 2020-06-13 VITALS — SYSTOLIC BLOOD PRESSURE: 124 MMHG | DIASTOLIC BLOOD PRESSURE: 82 MMHG

## 2020-06-13 DIAGNOSIS — Z77.22: ICD-10-CM

## 2020-06-13 DIAGNOSIS — K08.89: Primary | ICD-10-CM

## 2020-06-13 DIAGNOSIS — Z88.1: ICD-10-CM

## 2020-06-13 PROCEDURE — 99283 EMERGENCY DEPT VISIT LOW MDM: CPT

## 2020-06-13 NOTE — XMS REPORT
[facility]

                             Created on: 2019



Melanie Carroll

External Reference #: [id number]

: 2000

Sex: Female



Demographics





                          Address                   [address] [place]  [address]

 

                          Preferred Language        Unknown

 

                          Marital Status            Unknown

 

                          Buddhism Affiliation     Unknown

 

                          Race                      Unknown

 

                          Ethnic Group              Unknown





Author





                          Author                    Manuel, Melanie Doctor

 

                          Organization              [facility]

 

                          Address                   Unknown

 

                          Phone                     Unavailable







Care Team Providers





                    Care Team Member Name Role                Phone

 

                    Migration,  Doctor  Unavailable         Unavailable







PROBLEMS





          Type      Condition ICD9-CM Code NYQ83-CP Code Onset Dates Condition S

tatus SNOMED 

Code

 

          Problem   Attention deficit disorder           F90.0               Act

wilmar    [id number]

 

          Problem   Mood disorder           F39                 Active    383699

05

 

             Problem      Allergic rhinitis, unspecified seasonality, unspecifie

d trigger              J30.9        

                          Active                    [id number]

 

          Problem   Anxiety disorder, unspecified           F41.9               

Active    [id number]

 

          Problem   Dysthymic disorder           F34.1               Active    7

[id number]

 

          Problem   Social phobia           F40.10              Active    370636

02

 

          Problem   Depressive disorder, not elsewhere classified           F32.

9               Active    [id number]







ALLERGIES

No Information



ENCOUNTERS





                Encounter       Location        Date            Diagnosis

 

                          [facility]  [address] [id number]

[address] [place] [address] 

[id number]                12 Mar, 2019               

 

                          [facility]  [address] [id number]

[address] [place] [address] 

[id number]                12 Mar, 2019              Viral upper respiratory trac

t infection J06.9

 

                          [facility]     [address] [id number]

[address] [place] [phone]

                          15 [place], 2019              Vaginal discharge N89.8

 

                          [place] [id number]

[address] [place] [phone]

                          25 Oct, 2018               

 

                          [place] [id number]

[address] [place] [phone]

                          04 Oct, 2018              Dysuria R30.0 ; Sore throat 

J02.9 ; Allergic rhinitis, unspecified 

seasonality, unspecified trigger J30.9 and Vaginal candida B37.3

 

                          [facility]     [address] [id number]

[address] [place] [phone]

                          06 Sep, 2018              Cutaneous abscess of buttock

 L02.31

 

                          [facility]  [address] [id number]

[address] [place] [address] 

[phone]                               

 

                          [facility]     [address] [id number]

[address] [place] [phone]

                                         

 

                          [facility]     [address] [id number]

[address] [place] [phone]

                                        Cystitis N30.90 and Dysuria 

R30.0

 

                          [facility]   [address] [id number]

[address] [place] [phone]

                          15 [place], 2018              Dental examination Z01.20

 

                          [facility]  [address] [id number]

[address] [place] [address] 

[phone]                [place], 2018              Fever, unspecified fever cau

se R50.9 and Acute 

nasopharyngitis J00

 

                          [facility]     [address] [id number]

[address] [place] [phone]

                          31 Aug, 2017              Mood disorder F39

 

                          [facility]     [address] [id number]

[address] [place] [phone]

                          21 Aug, 2017              Mood disorder F39

 

                          [facility]     [address] [facility] [id number]

[address] [place] [phone]

                          22 Mar, 2017              Depressive disorder, not els

ewhere classified F32.9 and Social 

phobia F40.10

 

                          [facility]   [address] [id number]

[address] [place] [phone]

                          22 Mar, 2016              Encounter for dental examina

tion Z01.20

 

                          [facility]     [address] [id number]

[address] [place] [phone]

                          29 Sep, 2015              Depressive disorder, not els

ewhere classified 311 ; Anxiety state, 

unspecified 300.00 and Attention deficit disorder without mention of 
hyperactivity 314.00

 

                          [facility]     [address] [facility] [id number]

[address] [place] [phone]

                          01 Sep, 2015              Depressive disorder, not els

ewhere classified 311 ; Anxiety state, 

unspecified 300.00 and Attention deficit disorder without mention of 
hyperactivity 314.00

 

                          [facility]     [address] [facility] [id number]

[address] [place] [phone]

                                        Depressive disorder, not els

ewhere classified 311

 

                          [facility]     [address] [id number]

[address] [place] [address], [place] [address]

                          13 May, 2015              Depressive disorder, not els

ewhere classified 311

 

                          [facility]     [address] [id number]

[address] [place] [address], [place] [address]

                                         

 

                          [facility]     [address] [id number]

[address] [place] [address], [place] [address]

                                         

 

                          [facility]     [address] [id number]

[address] [place] [address], [place] [address]

                                         

 

                          [facility]     [address] [id number]

[address] [place] [address], [place] [address]

                          13 Mar, 2015               

 

                          [facility]     [address] [id number]

[address] [place] [address], [place] [address]

                          13 Mar, 2015               

 

                          [facility]     [address] [id number]

[address] [place] [address], [place] [address]

                                         

 

                          [facility]     [address] [id number]

[address] [place] [address], [place] [address]

                                         

 

                          [facility]     [address] [id number]

[address] [place] [address], [place] [address]

                          12 Dec, 2014               

 

                          [facility]     [address] [id number]

[address] [place] [phone]

                          12 Dec, 2014               

 

                          [facility]     [address] [id number]

[address] [place] [address], [place] [address]

                                         

 

                          [facility]     [address] [id number]

[address] [place] [phone]

                                         

 

                          [facility]     [address] [id number]

[address] [place] [address], [place] [address]

                          09 Oct, 2014               

 

                          [facility]     [address] [id number]

[address] [place] [phone]

                          09 Oct, 2014               

 

                          [facility]     [address] [id number]

[address] [place] [phone]

                          22 Aug, 2014               

 

                          [facility]     [address] [id number]

[address] [place] [phone]

                          22 Aug, 2014               

 

                          [facility]     [address] [id number]

[address] [place] [phone]

                          10 Jul, 2014               

 

                          [facility]     [address] [id number]

[address] [place] [phone]

                          10 Jul, 2014               

 

                          [facility]     [address] [id number]

[address] [place] [phone]

                          23 May, 2014               

 

                          [facility]     [address] [id number]

[address] [place] [phone]

                          23 May, 2014               

 

                          [facility]     [address] [id number]

[address] [place] [phone]

                                         

 

                          [facility]     [address] [id number]

[address] [place] [phone]

                          05 Dec, 2012               

 

                          [facility]     [address] [id number]

[address] [place] [phone]

                          05 Dec, 2012               

 

                          [facility]     [address] [id number]

[address] [place] [phone]

                          17 Dec, 2008               







IMMUNIZATIONS

No Known Immunizations



SOCIAL HISTORY

Never Assessed



REASON FOR VISIT

[facility]



PLAN OF CARE





VITAL SIGNS





MEDICATIONS





        Medication Instructions Dosage  Frequency Start Date End Date Duration S

tatus

 

                Bentyl 10 mg                    1 Capsule by Oral route 2 times 

per day PRN for tomach cramping  

                13 Mar, 2015                                    Active

 

             Zofran ODT 8 mg              1 tablet by Oral route every 8 hours P

RN nausea or vomiting              

13 Mar, 2015                                                Active

 

        Invokana         by oral route         13 Mar, 2015                 Acti

ve

 

        Kombiglyze XR         by oral route         13 Mar, 2015                

 Active







RESULTS

No Results



PROCEDURES

No Known procedures



INSTRUCTIONS





MEDICATIONS ADMINISTERED

No Known Medications



MEDICAL (GENERAL) HISTORY





                    Type                Description         Date

 

                    Medical History     Diabetes-II          

 

                    Surgical History    Tonsils removed     

 

                    Surgical History    Blood infection     

 

                    Hospitalization History ER visit for boil   2018

## 2020-06-13 NOTE — XMS REPORT
[facility]

                             Created on: 2020



Melanie Carroll

External Reference #: [id number]

: 2000

Sex: Female



Demographics





                          Address                   [address] [place]  [address]

 

                          Preferred Language        Unknown

 

                          Marital Status            Unknown

 

                          Religion Affiliation     Unknown

 

                          Race                      Unknown

 

                          Ethnic Group              Unknown





Author





                          Author                    Melanie DE LEON

 

                          Organization              [facility]

 

                          Address                   [address] [place]  [address]



 

                          Phone                     [phone]







Care Team Providers





                    Care Team Member Name Role                Phone

 

                    MOISESMARQUITAELA   Unavailable         [phone]







PROBLEMS





          Type      Condition ICD9-CM Code PMY00-PY Code Onset Dates Condition S

tatus SNOMED 

Code

 

          Problem   Attention deficit disorder           F90.0               Act

wilmar    [id number]

 

          Problem   Anxiety disorder, unspecified           F41.9               

Active    [id number]

 

          Problem   Dysthymic disorder           F34.1               Active    7

[id number]

 

                          Problem                   Type 2 diabetes mellitus wit

hout complication, without long-term current

use of insulin              E11.9                     Active       [id number]

 

                          Problem                   Severe episode of recurrent 

major depressive disorder, without psychotic

features                  F33.2                     Active       [id number]

 

          Problem   Social phobia           F40.10              Active    279286

02

 

          Problem   Depressive disorder, not elsewhere classified           F32.

9               Active    [id number]

 

          Problem   Mood disorder           F39                 Active    142973

05

 

             Problem      Allergic rhinitis, unspecified seasonality, unspecifie

d trigger              J30.9        

                          Active                    [id number]







ALLERGIES

No Information



ENCOUNTERS





                Encounter       Location        Date            Diagnosis

 

                          [place] [id number] [address] [id number]

[address] [place] [address]

                          02 Mar, 2020               

 

                          [facility]     [address] [id number]

[address] [place] [address]

                                         

 

                          [facility]     [address] [id number]

[address] [place] [address]

                                         

 

                          [facility]   [address] [id number]

[address] [place] [id number]

                                        Dental examination Z01.20 an

d Caries K02.9

 

                          [facility]     [address] [id number]

[address] [place] [address]

                          19 Dec, 2019               

 

                          [facility]     [address] [id number]

[address] [place] [address]

                          06 Dec, 2019              Cervical radiculopathy M54.1

2

 

                          [facility]     [address] [id number]

[address] [place] [address]

                                        Type 2 diabetes mellitus wit

hout complication, without long-term 

current use of insulin E11.9

 

                          [facility]     [address] [id number]

[address] [place] [address]

                                         

 

                          [facility]     [address] [facility] [id number]

[address] [place] [address]

                                        Trochanteric bursitis of lef

t hip M70.62 ; Encounter for initial 

prescription of contraceptive pills Z30.011 and Type 2 diabetes mellitus without
complication, without long-term current use of insulin E11.9

 

                          [facility]     [address] [id number]

[address] [place] [address]

                                         

 

                          [facility]     [address] [facility] [id number]

[address] [place] [address]

                          29 Oct, 2019              Left hip pain M25.552

 

                          [facility]     [address] [id number]

[address] [place] [address]

                          22 Oct, 2019               

 

                          [facility]     [address] [facility] [id number]

[address] [place] [address]

                          22 Oct, 2019               

 

                          [facility]     [address] [facility] [id number]

[address] [place] [address]

                          10 Sep, 2019              Anxiety disorder, unspecifie

d F41.9

 

                          [facility]     [address] [facility] [id number]

[address] [place] [address]

                          10 Sep, 2019              High risk heterosexual behav

ior Z72.51 and Left hip pain M25.552

 

                          [facility]   [address] [id number]

[address] [place] [phone]

                          07 Aug, 2019              Dental examination Z01.20

 

                          [facility]     [address] [id number]

[address] [place] [address]

                                         

 

                          [facility]     [address] [facility] [id number]

[address] [place] [address]

                                        Severe episode of recurrent 

major depressive disorder, without 

psychotic features F33.2 and Type 2 diabetes mellitus without complication, 
without long-term current use of insulin E11.9

 

                          [facility]  [address] 

[address]                12 Mar, 2019               

 

                          [facility] [place] [facility]  [address] 

[address]                12 Mar, 2019              Viral upper respiratory trac

t infection J06.9

 

                          [place] [address] [address]

                          15 [place], 2019              Vaginal discharge N89.8

 

                          [place] [address] [address]

                          25 Oct, 2018               

 

                          [place] [address] [address]

                          04 Oct, 2018              Dysuria R30.0 ; Sore throat 

J02.9 ; Allergic rhinitis, unspecified 

seasonality, unspecified trigger J30.9 and Vaginal candida B37.3

 

                          [place] [address] [address]

                          06 Sep, 2018              Cutaneous abscess of buttock

 L02.31

 

                          [facility] [place] [address] [id number]

[address] [place] [address] 

[address]                23 2018               

 

                          [place] [address] [address]

                                         

 

                          [place] [address] [address]

                                        Cystitis N30.90 and Dysuria 

R30.0

 

                          [facility]   [address] [id number] [place] [id number]

                          15 2018              Dental examination Z01.20

 

                          [facility] [place] [facility]  [address] [id number]

[address] [place] [address] 

[address]                15 [place], 2018              Fever, unspecified fever cau

se R50.9 and Acute 

nasopharyngitis J00

 

                          [place] [address] [id number]

[address] [place] [address]

                          31 Aug, 2017              Mood disorder F39

 

                          [facility]     [address] [facility] [id number]

[address] [place] [address]

                          21 Aug, 2017              Mood disorder F39

 

                          [facility]     [address] [facility] [id number]

[address] [place] [address]

                          22 Mar, 2017              Depressive disorder, not els

ewhere classified F32.9 and Social 

phobia F40.10

 

                          [facility]   [address] [id number]

[address] [place] [id number]

                          22 Mar, 2016              Encounter for dental examina

tion Z01.20

 

                          [facility]     [address] [facility] [id number]

[address] [place] [address]

                          29 Sep, 2015              Depressive disorder, not els

ewhere classified 311 ; Anxiety state, 

unspecified 300.00 and Attention deficit disorder without mention of 
hyperactivity 314.00

 

                          [facility]     [address] [facility] [id number]

[address] [place] [address]

                          01 Sep, 2015              Depressive disorder, not els

ewhere classified 311 ; Anxiety state, 

unspecified 300.00 and Attention deficit disorder without mention of 
hyperactivity 314.00

 

                          [facility]     [address] [facility] [id number]

[address] [place] [address]

                                        Depressive disorder, not els

ewhere classified 311

 

                          [facility]     [address] [facility] [id number]

[address] [place] [address]

                          13 May, 2015              Depressive disorder, not els

ewhere classified 311

 

                          [facility]     [address] [facility] [id number]

[address] [place] [address]

                                         

 

                          [facility]     [address] [facility] [id number]

[address] [place] [address]

                                         

 

                          [facility]     [address] [facility] [id number]

[address] [place] [address]

                                         

 

                          [facility]     [address] [facility] [id number]

[address] [place] [address]

                          13 Mar, 2015               

 

                          [facility]     [address] [id number]

[address] [place] [address], [place] [address]

                          13 Mar, 2015               

 

                          [facility]     [address] [id number]

[address] [place] [address], [place] [address]

                                         

 

                          [facility]     [address] [id number]

[address] [place] [address], [place] [address]

                                         

 

                          [facility]     [address] [id number]

[address] [place] [address], [place] [address]

                          12 Dec, 2014               

 

                          [facility]     [address] [id number]

[address] [place] [address], [place] [address]

                          12 Dec, 2014               

 

                          [facility]     [address] [id number]

[address] [place] [address], [place] [address]

                                         

 

                          [facility]     [address] [id number]

[address] [place] [address], [place] [address]

                                         

 

                          [facility]     [address] [id number]

[address] [place] [address], [place] [address]

                          09 Oct, 2014               

 

                          [facility]     [address] [id number]

[address] [place] [address], [place] [address]

                          09 Oct, 2014               

 

                          [facility]     [address] [id number]

[address] [place] [address], [place] [address]

                          22 Aug, 2014               

 

                          [facility]     [address] [id number]

[address] [place] [address], [place] [address]

                          22 Aug, 2014               

 

                          [facility]     [address] [id number]

[address] [place] [address], [place] [address]

                          10 Jul, 2014               

 

                          [facility]     [address] [id number]

[address] [place] [address], [place] [address]

                          10 Jul, 2014               

 

                          [facility]     [address] [id number]

[address] [place] [address], [place] [address]

                          23 May, 2014               

 

                          [facility]     [address] [id number]

[address] [place] [address], [place] [address]

                          23 May, 2014               

 

                          [facility]     [address] [id number]

[address] [place] [address], [place] [address]

                                         

 

                          [facility]     [address] [facility] [id number]

[address] [place] [address]

                          05 Dec, 2012               

 

                          [facility]     [address] [facility] [id number]

[address] [place] [address]

                          05 Dec, 2012               

 

                          [facility]     [address] [facility] [id number]

[address] [place] [address]

                          17 Dec, 2008               







IMMUNIZATIONS

No Known Immunizations



SOCIAL HISTORY

Never Assessed



REASON FOR VISIT





PLAN OF CARE





VITAL SIGNS





MEDICATIONS

Unknown Medications



RESULTS

No Results



PROCEDURES





                Procedure       Date Ordered    Result          Body Site

 

                PSYTX PT&/FAMILY 45 MINUTES 2014                     







INSTRUCTIONS





MEDICATIONS ADMINISTERED

No Known Medications



MEDICAL (GENERAL) HISTORY





                    Type                Description         Date

 

                    Medical History     Diabetes-II          

 

                    Medical History     Anxiety disorder, unspecified  

 

                    Medical History     Mood disorder        

 

                    Medical History     Social phobia        

 

                    Medical History     no hx of seizures or head injuries  

 

                    Surgical History    Tonsils removed     

 

                    Surgical History    Blood infection     

 

                    Hospitalization History ER visit for boil   2018

## 2020-06-13 NOTE — XMS REPORT
[facility]

                             Created on: 2020



Melanie Carroll

External Reference #: [id number]

: 2000

Sex: Female



Demographics





                          Address                   [address] [place] [address]  [address]

 

                          Preferred Language        Unknown

 

                          Marital Status            Unknown

 

                          Gnosticism Affiliation     Unknown

 

                          Race                      Unknown

 

                          Ethnic Group              Unknown





Author





                          Author                    Melanie LENZ

 

                          Organization              [facility]

 

                          Address                   Unknown

 

                          Phone                     [phone]







Care Team Providers





                    Care Team Member Name Role                Phone

 

                    DOUG LENZ      Unavailable         [phone]







PROBLEMS





          Type      Condition ICD9-CM Code GST15-GG Code Onset Dates Condition S

tatus SNOMED 

Code

 

          Problem   Attention deficit disorder           F90.0               Act

wilmar    [id number]

 

          Problem   Anxiety disorder, unspecified           F41.9               

Active    [id number]

 

          Problem   Dysthymic disorder           F34.1               Active    7

[id number]

 

                          Problem                   Type 2 diabetes mellitus wit

hout complication, without long-term current

use of insulin              E11.9                     Active       [id number]

 

                          Problem                   Severe episode of recurrent 

major depressive disorder, without psychotic

features                  F33.2                     Active       08876437

 

          Problem   Social phobia           F40.10              Active    584863

02

 

          Problem   Depressive disorder, not elsewhere classified           F32.

9               Active    [id number]

 

          Problem   Mood disorder           F39                 Active    161703

05

 

             Problem      Allergic rhinitis, unspecified seasonality, unspecifie

d trigger              J30.9        

                          Active                    [id number]







ALLERGIES

No Information



ENCOUNTERS





                Encounter       Location        Date            Diagnosis

 

                    [place] [id number] [place] [address]                                 

 

                    [facility] [address] [address]                                 

 

                    [facility] [id number] [place] [address]                                 

 

                    [facility] [address] [id number] [place] [id number]                                Dental examination Z01.20 and Caries K02

.9

 

                    [place] [id number] [place] [address] 19 

Dec, 2019                                

 

                    [facility] [address] [address] 06 

Dec, 2019                               Cervical radiculopathy M54.12

 

                    [place] [address] [address]                                Type 2 diabetes mellitus without complic

ation, without long-term 

current use of insulin E11.9

 

                    [place] [id number] [place] [address]                                 

 

                    [place] [address] [address]                                Trochanteric bursitis of left hip M70.62

 ; Encounter for initial 

prescription of contraceptive pills Z30.011 and Type 2 diabetes mellitus without
complication, without long-term current use of insulin E11.9

 

                    [place] [address] [address]                                 

 

                    [place] [address] [address] 29 

Oct, 2019                               Left hip pain M25.552

 

                    [place] [address] [address] 22 

Oct, 2019                                

 

                    [place] [address] [address] 22 

Oct, 2019                                

 

                    [place] [address] [address] 10 

Sep, 2019                               Anxiety disorder, unspecified F41.9

 

                    [place] [address] [address] 10 

Sep, 2019                               High risk heterosexual behavior Z72.51 a

nd Left hip pain M25.552

 

                    [facility] [id number] [place] [id number] 07 

Aug, 2019                               Dental examination Z01.20

 

                    [place] [address] [address]                                 

 

                    [place] [address] [address]                                Severe episode of recurrent major depres

sive disorder, without 

psychotic features F33.2 and Type 2 diabetes mellitus without complication, 
without long-term current use of insulin E11.9

 

                          [facility]  [address] [id number]

[address] [place] [address] 

[address]                12 Mar, 2019               

 

                          [facility]  [id number]

[address] [place] [address] 

[address]                12 Mar, 2019              Viral upper respiratory trac

t infection J06.9

 

                    [facility] [id number] [place] [address] 15 

[place], 2019                               Vaginal discharge N89.8

 

                    [place] [address] [address] 25 

Oct, 2018                                

 

                    [place] [address] [address] 04 

Oct, 2018                               Dysuria R30.0 ; Sore throat J02.9 ; Foster

rgic rhinitis, unspecified 

seasonality, unspecified trigger J30.9 and Vaginal candida B37.3

 

                    [place] [address] [address] 06 

Sep, 2018                               Cutaneous abscess of buttock L02.31

 

                          [facility]  [id number]

[address] [place] [address] 

[address]                               

 

                    [place] [address] [address]                                 

 

                    [facility] [address] [address]                                Cystitis N30.90 and Dysuria R30.0

 

                    [facility] [address] [facility] [place] [id number] 

2018                               Dental examination Z01.20

 

                          [facility]  [address] [id number]

[address] [place] [address] 

[address]                15 [place], 2018              Fever, unspecified fever cau

se R50.9 and Acute 

nasopharyngitis J00

 

                    [facility] [id number] [place] [address] 31 

Aug, 2017                               Mood disorder F39

 

                    [place] [address] [address] 21 

Aug, 2017                               Mood disorder F39

 

                    [facility] [address] [facility] [place] [address] 22 

Mar, 2017                               Depressive disorder, not elsewhere class

ified F32.9 and Social phobia 

F40.10

 

                    [facility] [address] [facility] [place] [phone] 22 

Mar, 2016                               Encounter for dental examination Z01.20

 

                    [facility] [id number] [place] [address] 29 

Sep, 2015                               Depressive disorder, not elsewhere class

ified 311 ; Anxiety state, 

unspecified 300.00 and Attention deficit disorder without mention of 
hyperactivity 314.00

 

                    [facility] [id number] [place] [address] 01 

Sep, 2015                               Depressive disorder, not elsewhere class

ified 311 ; Anxiety state, 

unspecified 300.00 and Attention deficit disorder without mention of 
hyperactivity 314.00

 

                    [facility] [id number] [place] [address]                                Depressive disorder, not elsewhere class

ified 311

 

                    [facility] [id number] [place] [address] 13 

May, 2015                               Depressive disorder, not elsewhere class

ified 311

 

                    [facility] [id number] [place] [address]                                 

 

                    [facility] [id number] [place] [address]                                 

 

                    [facility] [id number] [place] [address]                                 

 

                    [facility] [address] [address] 13 

Mar, 2015                                

 

                    [facility] [address] [address] 13 

Mar, 2015                                

 

                    [facility] [address] [address]                                 

 

                    [facility] [address] [address]                                 

 

                    [facility] [address] [facility] [place] [address] 12 

Dec, 2014                                

 

                    [facility] [id number] [place] [address] 12 

Dec, 2014                                

 

                    [facility] [id number] [place] [address]                                 

 

                    [facility] [id number] [place] [address]                                 

 

                    [facility] [id number] [place] [address] 09 

Oct, 2014                                

 

                    [facility] [id number] [place] [address] 09 

Oct, 2014                                

 

                    [facility] [id number] [place] [address] 22 

Aug, 2014                                

 

                    [facility] [id number] [place] [address] 22 

Aug, 2014                                

 

                    [facility] [id number] [place] [address] 10 

Jul, 2014                                

 

                    [facility] [id number] [place] [address] 10 

Jul, 2014                                

 

                    [facility] [id number] [place] [address] 23 

May, 2014                                

 

                    [facility] [id number] [place] [address] 23 

May, 2014                                

 

                    [facility] [id number] [place] [address]                                 

 

                    [facility] [id number] [place] [address] 05 

Dec, 2012                                

 

                    [facility] [id number] [place] [address] 05 

Dec, 2012                                

 

                    [facility] [address] [facility] [place] [address] 17 

Dec, 2008                                







IMMUNIZATIONS

No Known Immunizations



SOCIAL HISTORY

Never Assessed



REASON FOR VISIT





PLAN OF CARE





VITAL SIGNS





MEDICATIONS

Unknown Medications



RESULTS

No Results



PROCEDURES





                Procedure       Date Ordered    Result          Body Site

 

                PSYCH DIAGNOSTIC EVALUATION May 23, 2014                     







INSTRUCTIONS





MEDICATIONS ADMINISTERED

No Known Medications



MEDICAL (GENERAL) HISTORY





                    Type                Description         Date

 

                    Medical History     Diabetes-II          

 

                    Medical History     Anxiety disorder, unspecified  

 

                    Medical History     Mood disorder        

 

                    Medical History     Social phobia        

 

                    Medical History     no hx of seizures or head injuries  

 

                    Surgical History    Tonsils removed     

 

                    Surgical History    Blood infection     

 

                    Hospitalization History ER visit for boil   2018

## 2020-06-13 NOTE — XMS REPORT
[facility]

                             Created on: 2019



Melanie Carroll

External Reference #: [id number]

: 2000

Sex: Female



Demographics





                          Address                   [address] [place]  [address]

 

                          Preferred Language        Unknown

 

                          Marital Status            Unknown

 

                          Voodoo Affiliation     Unknown

 

                          Race                      Unknown

 

                          Ethnic Group              Unknown





Author





                          Author                    Melanie CARRERO

 

                          [facility]

 

                          Address                   [address] [place]  [address]



 

                          Phone                     [phone]







Care Team Providers





                    Care Team Member Name Role                Phone

 

                    ALISE,  GOGO  Unavailable         [phone]







PROBLEMS





          Type      Condition ICD9-CM Code WCH56-RQ Code Onset Dates Condition S

tatus SNOMED 

Code

 

          Problem   Attention deficit disorder           F90.0               Act

wilmar    [id number]

 

          Problem   Anxiety disorder, unspecified           F41.9               

Active    [id number]

 

          Problem   Dysthymic disorder           F34.1               Active    7

[id number]

 

                          Problem                   Type 2 diabetes mellitus wit

hout complication, without long-term current

use of insulin              E11.9                     Active       [id number]

 

                          Problem                   Severe episode of recurrent 

major depressive disorder, without psychotic

features                  F33.2                     Active       41630443

 

          Problem   Social phobia           F40.10              Active    978005

02

 

          Problem   Depressive disorder, not elsewhere classified           F32.

9               Active    [id number]

 

          Problem   Mood disorder           F39                 Active    272523

05

 

             Problem      Allergic rhinitis, unspecified seasonality, unspecifie

d trigger              J30.9        

                          Active                    [id number]







ALLERGIES





             Substance    Reaction     Event Type   Date         Status

 

             Rocephin     Unknown      Drug Allergy 12 Mar, 2019 Active







ENCOUNTERS





                Encounter       Location        Date            Diagnosis

 

                          [facility]   [address] [facility] [id number]

[address] [place] [id number]

                          07 Aug, 2019               

 

                          [facility]     [address] [id number]

[address] [place] [address]

                                         

 

                          [facility]     [address] [id number]

[address] [place] [address]

                                        Severe episode of recurrent 

major depressive disorder, without 

psychotic features F33.2 and Type 2 diabetes mellitus without complication, 
without long-term current use of insulin E11.9

 

                          [facility]  [address] [facility] [id number]

[address] [place] [address] 

[id number]                12 Mar, 2019               

 

                          [facility]  [address] [id number]

[address] [place] [address] 

[address]                12 Mar, 2019              Viral upper respiratory trac

t infection J06.9

 

                          [facility]     [address] [address]

                          15 [place], 2019              Vaginal discharge N89.8

 

                          [facility]     [address] [id number]

[address] [place] [address]

                          25 Oct, 2018               

 

                          [facility]     [address] [address]

                          04 Oct, 2018              Dysuria R30.0 ; Sore throat 

J02.9 ; Allergic rhinitis, unspecified 

seasonality, unspecified trigger J30.9 and Vaginal candida B37.3

 

                          [place] [address] [id number]

[address] [place] [address]

                          06 Sep, 2018              Cutaneous abscess of buttock

 L02.31

 

                          [facility]  [address] 

[address]                               

 

                          [facility]     [address] [id number]

[address] [place] [address]

                                         

 

                          [place] [address] [address]

                                        Cystitis N30.90 and Dysuria 

R30.0

 

                          [facility]   [address] [id number]

[address] [place] [id number]

                          15 [place], 2018              Dental examination Z01.20

 

                          [facility]  [id number]

[address] [place] [address] 

[address]                15 [place], 2018              Fever, unspecified fever cau

se R50.9 and Acute 

nasopharyngitis J00

 

                          [facility]     [address] [id number]

[address] [place] [address]

                          31 Aug, 2017              Mood disorder F39

 

                          [facility]     [address] [id number]

[address] [place] [address]

                          21 Aug, 2017              Mood disorder F39

 

                          [facility]     [address] [id number]

[address] [place] [address]

                          22 Mar, 2017              Depressive disorder, not els

ewhere classified F32.9 and Social 

phobia F40.10

 

                          [facility]   [address] [id number]

[address] [place] [id number]

                          22 Mar, 2016              Encounter for dental examina

tion Z01.20

 

                          [facility]     [address] [id number]

[address] [place] [address]

                          29 Sep, 2015              Depressive disorder, not els

ewhere classified 311 ; Anxiety state, 

unspecified 300.00 and Attention deficit disorder without mention of 
hyperactivity 314.00

 

                          [facility]     [address] [id number]

[address] [place] [address]

                          01 Sep, 2015              Depressive disorder, not els

ewhere classified 311 ; Anxiety state, 

unspecified 300.00 and Attention deficit disorder without mention of 
hyperactivity 314.00

 

                          [facility]     [address] [id number]

[address] [place] [address]

                                        Depressive disorder, not els

ewhere classified 311

 

                          [facility]     [address] [id number]

[address] [place] [address]

                          13 May, 2015              Depressive disorder, not els

ewhere classified 311

 

                          [facility]     [address] [id number]

[address] [place] [address]

                                         

 

                          [facility]     [address] [id number]

[address] [place] [address]

                                         

 

                          [facility]     [address] [id number]

[address] [place] [address]

                                         

 

                          [facility]     [address] [id number]

[address] [place] [address]

                          13 Mar, 2015               

 

                          [facility]     [address] [id number]

[address] [place] [address]

                          13 Mar, 2015               

 

                          [facility]     [address] [id number]

[address] [place] [address]

                                         

 

                          [facility]     [address] [id number]

[address] [place] [address]

                                         

 

                          [facility]     [address] [id number]

[address] [place] [address], [place] [address]

                          12 Dec, 2014               

 

                          [facility]     [address] [id number]

[address] [place] [address], [place] [address]

                          12 Dec, 2014               

 

                          [facility]     [address] [id number]

[address] [place] [address], [place] [address]

                                         

 

                          [facility]     [address] [id number]

[address] [place] [address], [place] [address]

                                         

 

                          [facility]     [address] [id number]

[address] [place] [address], [place] [address]

                          09 Oct, 2014               

 

                          [facility]     [address] [id number]

[address] [place] [address], [place] [address]

                          09 Oct, 2014               

 

                          [facility]     [address] [id number]

[address] [place] [address], [place] [address]

                          22 Aug, 2014               

 

                          [facility]     [address] [id number]

[address] [place] [address], [place] [address]

                          22 Aug, 2014               

 

                          [facility]     [address] [id number]

[address] [place] [address], [place] [address]

                          10 Jul, 2014               

 

                          [facility]     [address] [id number]

[address] [place] [address], [place] [address]

                          10 Jul, 2014               

 

                          [facility]     [address] [id number]

[address] [place] [address], [place] [address]

                          23 May, 2014               

 

                          [facility]     [address] [id number]

[address] [place] [address], [place] [address]

                          23 May, 2014               

 

                          [facility]     [address] [id number]

[address] [place] [address], [place] [address]

                                         

 

                          [facility]     [address] [id number]

[address] [place] [address], [place] [address]

                          05 Dec, 2012               

 

                          [facility]     [address] [id number]

[address] [place] [address], [place] [address]

                          05 Dec, 2012               

 

                          [facility]     [address] [id number]

[address] [place] [address]

                          17 Dec, 2008               







IMMUNIZATIONS

No Known Immunizations



SOCIAL HISTORY

Never Assessed



REASON FOR VISIT

Sore throat, ear pain. Started three days ago. -Juany RIZZO



PLAN OF CARE





                          Activity                  Details

 

                                         

 

                          Follow Up                 if not improving with PCP or

 reg follow up Reason:







VITAL SIGNS





                    Height              64 in               2019

 

                    Weight              199 lbs             2019

 

                    Temperature         98.6 degrees Fahrenheit 2019

 

                    Heart Rate          80 bpm              2019

 

                    Respiratory Rate    20                  2019

 

                    BMI                 34.15 kg/m2         2019

 

                    Blood pressure systolic 114 mmHg            2019

 

                    Blood pressure diastolic 86 mmHg             2019







MEDICATIONS





        Medication Instructions Dosage  Frequency Start Date End Date Duration S

tatus

 

             Tessalon Perles 100 mg Orally Three times a day 1 capsule as needed

 8h           12 Mar, 

2019                                    10 days             Active







RESULTS

No Results



PROCEDURES

No Known procedures



INSTRUCTIONS





MEDICATIONS ADMINISTERED

No Known Medications



MEDICAL (GENERAL) HISTORY





                    Type                Description         Date

 

                    Medical History     Diabetes-II          

 

                    Surgical History    Tonsils removed     

 

                    Surgical History    Blood infection     

 

                    Hospitalization History ER visit for boil   2018

## 2020-06-13 NOTE — XMS REPORT
[facility]

                             Created on: 10/27/2018



Melanie Carroll

External Reference #: [id number]

: 2000

Sex: Female



Demographics





                          Address                   [address] [place]  [address]

 

                          Preferred Language        Unknown

 

                          Marital Status            Unknown

 

                          Confucianism Affiliation     Unknown

 

                          Race                      Unknown

 

                          Ethnic Group              Unknown





Author





                          Author                    Melanie BENAVIDEZ DEANNE

 

                          [facility]

 

                          Address                   [address] [place]  [address]



 

                          Phone                     [phone]







Care Team Providers





                    Care Team Member Name Role                Phone

 

                    DEANNE BENAVIDEZ       Unavailable         [phone]







PROBLEMS





          Type      Condition ICD9-CM Code WTO10-DP Code Onset Dates Condition S

tatus SNOMED 

Code

 

          Problem   Attention deficit disorder           F90.0               Act

wilmar    [id number]

 

             Problem      Allergic rhinitis, unspecified seasonality, unspecifie

d trigger              J30.9        

                          Active                    [id number]

 

          Problem   Mood disorder           F39                 Active    688173

05

 

          Problem   Dysthymic disorder           F34.1               Active    7

[id number]

 

          Problem   Anxiety disorder, unspecified           F41.9               

Active    708646425

 

          Problem   Depressive disorder, not elsewhere classified           F32.

9               Active    05779359

 

          Problem   Social phobia           F40.10              Active    764617

02







ALLERGIES

No Information



ENCOUNTERS





                Encounter       Location        Date            Diagnosis

 

                          [facility]     [address] [address]

                          25 Oct, 2018               

 

                          [facility]     [address] [address]

                          04 Oct, 2018              Dysuria R30.0 ; Sore throat 

J02.9 ; Allergic rhinitis, unspecified 

seasonality, unspecified trigger J30.9 and Vaginal candida B37.3

 

                          [facility]     [address] [id number]

[address] [place] [address]

                          06 Sep, 2018              Cutaneous abscess of buttock

 L02.31

 

                          [facility]  [address] [id number]

[address] [place] [address] 

[phone]                               

 

                          [facility]     [address] [address]

                                         

 

                          [facility]     [address] [id number]

[address] [place] [phone]

                                        Cystitis N30.90 and Dysuria 

R30.0

 

                          [facility]   [address] [facility] [id number]

[address] [place] [id number]

                          15 [place], 2018              Dental examination Z01.20

 

                          [facility]  [address] [id number]

[address] [place] [address] 

[phone]                15 [place], 2018              Fever, unspecified fever cau

se R50.9 and Acute 

nasopharyngitis J00

 

                          [facility]     [address] [facility] [id number]

[address] [place] [address]

                          31 Aug, 2017              Mood disorder F39

 

                          [facility]     [address] [id number]

[address] [place] [address]

                          21 Aug, 2017              Mood disorder F39

 

                          [facility]     [id number] [place] [address]

                          22 Mar, 2017              Depressive disorder, not els

ewhere classified F32.9 and Social 

phobia F40.10

 

                          [facility]   [address] [facility] [id number]

[address] [place] [id number]

                          22 Mar, 2016              Encounter for dental examina

tion Z01.20

 

                          [facility]     [address] [facility] [id number]

[address] [place] [address]

                          29 Sep, 2015              Depressive disorder, not els

ewhere classified 311 ; Anxiety state, 

unspecified 300.00 and Attention deficit disorder without mention of 
hyperactivity 314.00

 

                          [facility]     [address] [id number]

[address] [place] [address]

                          01 Sep, 2015              Depressive disorder, not els

ewhere classified 311 ; Anxiety state, 

unspecified 300.00 and Attention deficit disorder without mention of 
hyperactivity 314.00

 

                          [facility]     [address] [id number]

[address] [place] [phone]

                                        Depressive disorder, not els

ewhere classified 311

 

                          [facility]     [id number] [place] [address]

                          13 May, 2015              Depressive disorder, not els

ewhere classified 311

 

                          [facility]     [address] [id number]

[address] [place] [phone]

                                         

 

                          [facility]     [address] [id number]

[address] [place] [address], [place] [address]

                          14 2015               

 

                          [facility]     [address] [id number]

[address] [place] [address], [place] [address]

                                         

 

                          [facility]     [address] [id number]

[address] [place] [address], [place] [address]

                          13 Mar, 2015               

 

                          [facility]     [address] [id number]

[address] [place] [address], [place] [address]

                          13 Mar, 2015               

 

                          [facility]     [address] [id number]

[address] [place] [address], [place] [address]

                                         

 

                          [facility]     [address] [id number]

[address] [place] [address], [place] [address]

                                         

 

                          [facility]     [address] [id number]

[address] [place] [address], [place] [address]

                          12 Dec, 2014               

 

                          [facility]     [address] [id number]

[address] [place] [address], [place] [address]

                          12 Dec, 2014               

 

                          [facility]     [address] [id number]

[address] [place] [address], [place] [address]

                                         

 

                          [facility]     [address] [id number]

[address] [place] [address], [place] [address]

                                         

 

                          [facility]     [address] [id number]

[address] [place] [address], [place] [address]

                          09 Oct, 2014               

 

                          [facility]     [address] [id number]

[address] [place] [address], [place] [address]

                          09 Oct, 2014               

 

                          [facility]     [address] [id number]

[address] [place] [address], [place] [address]

                          22 Aug, 2014               

 

                          [facility]     [address] [id number]

[address] [place] [address], [place] [address]

                          22 Aug, 2014               

 

                          [facility]     [address] [id number]

[address] [place] [address], [place] [address]

                          10 Jul, 2014               

 

                          [facility]     [address] [id number]

[address] [place] [phone]

                          10 Jul, 2014               

 

                          [facility]     [address] [facility] [address] [facility] [id number]

[address] [place] [phone]

                          23 May, 2014               

 

                          [facility]     [address] [id number]

[address] [place] [address]

                          23 May, 2014               

 

                          [facility]     [address] [id number]

[address] [place] [address]

                                         

 

                          [facility]     [address] [id number]

[address] [place] [address]

                          05 Dec, 2012               

 

                          [facility]     [address] [id number]

[address] [place] [phone]

                          05 Dec, 2012               

 

                          [facility]     [address] [facility] [id number]

[address] [place] [address]

                          17 Dec, 2008               







IMMUNIZATIONS

No Known Immunizations



SOCIAL HISTORY

Never Assessed



REASON FOR VISIT

Routine nurse call



PLAN OF CARE





VITAL SIGNS





MEDICATIONS

Unknown Medications



RESULTS

No Results



PROCEDURES

No Known procedures



INSTRUCTIONS





MEDICATIONS ADMINISTERED

No Known Medications



MEDICAL (GENERAL) HISTORY





                    Type                Description         Date

 

                    Medical History     Diabetes-II          

 

                    Surgical History    Tonsils removed     

 

                    Surgical History    Blood infection     

 

                    Hospitalization History ER visit for boil   2018

## 2020-06-13 NOTE — XMS REPORT
Continuity of Care Document

                             Created on: 2020



PONCE SINGH

External Reference #: [id number]

: 2000

Sex: Female



Demographics





                          Address                   [address] [place]  [address]

 

                          Home Phone                [phone] x

 

                          Preferred Language        Unknown

 

                          Marital Status            Unknown

 

                          Mormonism Affiliation     Unknown

 

                          Race                      Unknown

 

                          Ethnic Group              Unknown





Author





                          Organization              Unknown

 

                          Address                   Unknown

 

                          Phone                     Unavailable



              



Allergies

      



             Active           Description           Code           Type         

  Severity   

                Reaction           Onset           Reported/Identified          

 

Relationship to Patient                 Clinical Status        

 

           Yes           Rocephin                       Drug Allergy            

           

                                2009                                    

 

             Yes           Rocephin                        Drug Allergy         

  N/A         

             N/A                        2009                              

   

 

             Yes           ceftriaxone           [id number]           Drug Aller

gy           

Mild           HIVES                        2010                          

  

    



                      



Medications

      



There is no data.                  



Problems

      



             Date Dx Coded           Attending           Type           Code    

       

Diagnosis                               Diagnosed By        

 

             2008           DEISI NAYLOR DO                        465.9 

          

UPPER RESPIRATORY INFECTION                      

 

             2008           DOUG LENZ LCPC                        46

5.9           

UPPER RESPIRATORY INFECTION                      

 

                2008           LEIGH ANN TEJADA PSYD                   

        465.9       

                          UPPER RESPIRATORY INFECTION                    

 

                2008           LEIGH ANN TEJADA PSYD L                   

        465.9       

                          UPPER RESPIRATORY INFECTION                    

 

                2008           [facility], ABILIO R                    

       465.9        

                          UPPER RESPIRATORY INFECTION                    

 

                2008           [facility], ABILIO R                    

       465.9        

                          UPPER RESPIRATORY INFECTION                    

 

                2008           MAO YT                     

      465.9         

                          UPPER RESPIRATORY INFECTION                    

 

                2008           [facility], ABILIO R                    

       465.9        

                          UPPER RESPIRATORY INFECTION                    

 

             2009           DEISI NAYLOR DO                        278.00

           

OBESITY                                          

 

             2009           DEISI NAYLOR DO                        381.81

           

EUSTACHIAN TUBE DYSFUNCTION                      

 

             2009           DEISI NAYLOR DO                        788.1 

          pain

during urination (dysuria)                       

 

                2009           DOUG LENZ LCPC                       

    278.00          

                          OBESITY                            

 

                2009           DOUG LENZ LCPC                       

    381.81          

                          EUSTACHIAN TUBE DYSFUNCTION                    

 

             2009           DOUG LENZ LCPC B                        78

8.1           

pain during urination (dysuria)                    

 

                2009           LEIGH ANN TEJADA PSYD L                   

        278.00      

                          OBESITY                            

 

                2009           LEIGH ANN TEJADA PSYD                   

        381.81      

                          EUSTACHIAN TUBE DYSFUNCTION                    

 

                2009           LEIGH ANN TEJADA PSYD                   

        788.1       

                          pain during urination (dysuria)                    

 

                2009           LEIGH ANN TEJADA PSYD ANN L                   

        278.00      

                          OBESITY                            

 

                2009           LEIGH ANN TEJADA PSYD L                   

        381.81      

                          EUSTACHIAN TUBE DYSFUNCTION                    

 

                2009           LEIGH ANN TEJADA PSYD L                   

        788.1       

                          pain during urination (dysuria)                    

 

                2009           MOISES LSCS, ABILIO R                    

       278.00       

                          OBESITY                            

 

                2009           MOISES LSCS, ABILIO R                    

       381.81       

                          EUSTACHIAN TUBE DYSFUNCTION                    

 

                2009           MOISES LSCS, ABILIO R                    

       788.1        

                          pain during urination (dysuria)                    

 

                2009           MOISES LSCS, ABILIO R                    

       278.00       

                          OBESITY                            

 

                2009           [facility] LSCS, ABILIO R                    

       381.81       

                          EUSTACHIAN TUBE DYSFUNCTION                    

 

                2009           MOISES LSCS, ABILIO R                    

       788.1        

                          pain during urination (dysuria)                    

 

                2009           RAJOTTE APRN, MAO A                     

      278.00        

                          OBESITY                            

 

                2009           RAJOTTE APRN, MAO A                     

      381.81        

                          EUSTACHIAN TUBE DYSFUNCTION                    

 

                2009           RAJOTTE APRN, MAO A                     

      788.1         

                          pain during urination (dysuria)                    

 

                2009           MOISES LSCS, ABILIO R                    

       278.00       

                          OBESITY                            

 

                2009           MOISES LSCS, ABILIO R                    

       381.81       

                          EUSTACHIAN TUBE DYSFUNCTION                    

 

                2009           MOISES LSCS, ABILIO R                    

       788.1        

                          pain during urination (dysuria)                    

 

             2010                        Ot           599.0           URIN

 TRACT 

INFECTION NOS                                    

 

             2010                        Ot           786.50           ISAI

ST PAIN NOS   

                                                 

 

             2010                        Ot           786.52           ERNST

NFUL 

RESPIRATION                                      

 

             2011                        Ot           786.50           ISAI

ST PAIN NOS   

                                                 

 

             2011                        Ot           786.52           ERNST

NFUL 

RESPIRATION                                      

 

             2011                        Ot           250.00           TELLY

B KEENAN WO 

COMPL, TYPE II OR UNSPEC TY                      

 

             2011                        Ot           465.9           ACUT

E URI NOS     

                                                 

 

             2011                        Ot           780.60           FEV

ER, 

UNSPECIFIED                                      

 

             2011                        Ot           V58.69           OTH

 

MED,LT,CURRENT USE                               

 

             2013                        Ot           250.02           TELLY

B KEENAN WO 

COMPL, TYPE II OR UNSPEC TY                      

 

             2013                        Ot           278.00           OBE

SITY, NOS     

                                                 

 

             2013                        Ot           462           ACUTE 

PHARYNGITIS   

                                                 

 

             2013                        Ot           493.00           EXT

RINSIC ASTHMA,

NOS                                              

 

             2013                        Ot           682.2           CELL

ULITIS OF 

TRUNK                                            

 

             2013                        Ot           785.0           TACH

YCARDIA NOS   

                                                 

 

             2013                        Ot           V12.04           PER

RAINER HIST OF 

METHICILLIN RESISTANT S                          

 

             2013                        Ot           V12.29           PER

RAINER HX OF 

OTH ENDOCRINE, METABOLIC                         

 

             2013                        Ot           V15.81           HX 

OF PAST 

NONCOMPLIANCE                                    

 

             2014           DOUG LENZ LCPC                        31

1           

DEPRESSIVE DISORDER NOS                          

 

                2014           LEIGH ANN TEJADA PSYD ANN L                   

        311         

                          DEPRESSIVE DISORDER NOS                    

 

                2014           LEIGH ANN TEJADA PSYD L                   

        311         

                          DEPRESSIVE DISORDER NOS                    

 

                2014           [facility], ABILIO R                    

       311          

                          DEPRESSIVE DISORDER NOS                    

 

                2014           [facility]                    

       311          

                          DEPRESSIVE DISORDER NOS                    

 

             2014           MAO TY A                        

311           

DEPRESSIVE DISORDER NOS                          

 

                2014           [facility], ABILIO R                    

       311          

                          DEPRESSIVE DISORDER NOS                    

 

                2015           MAO TY A                     

      558.9         

                          GASTROENTERITIS NONINFECTIOUS                    

 

                2015           [facility], ABILIO R                    

       558.9        

                          GASTROENTERITIS NONINFECTIOUS                    

 

           03/10/2016                       Ot           789.00                 

           

   

 

           03/10/2016                       Ot           789.00                 

           

   

 

                2016           SAMANTHA CHAUDHARY APRN           Ot        

      M25.561      

                                                             

 

                2016           SAMANTHA CHAUDHARY APRN           Ot        

      M25.562      

                                                             

 

                2016           MARGI MAY DO S           Ot      

        R10.32     

                          LEFT LOWER QUADRANT PAIN                    

 

                2016           MARGI MAY DO S           Ot      

        R10.32     

                          LEFT LOWER QUADRANT PAIN                    

 

             2016           MAXI CAST DO           Ot           E11.9  

         TYPE

2 DIABETES MELLITUS WITHOUT COMPLIC                    

 

             2016           MAXI CAST DO           Ot           E66.9  

         

OBESITY, UNSPECIFIED                             

 

             2016           MAXI CAST DO           Ot           F17.210

           

NICOTINE DEPENDENCE, CIGARETTES, UNCOMPL                    

 

             2016           MAXI CAST DO           Ot           L02.211

           

CUTANEOUS ABSCESS OF ABDOMINAL WALL                    

 

             2016           MAXI CAST DO           Ot           Z79.4  

         LONG

TERM (CURRENT) USE OF INSULIN                    

 

             2016           MAXI CAST DO           Ot           Z86.14 

          

PERSONAL HISTORY OF METHICILLIN RESIS ST                    

 

             2016                        Ot           R10.2           PELV

IC AND 

PERINEAL PAIN                                    

 

             2016           MAXI CAST DO           Ot           E11.9  

         TYPE

2 DIABETES MELLITUS WITHOUT COMPLIC                    

 

             2016           MAXI CAST DO           Ot           E66.9  

         

OBESITY, UNSPECIFIED                             

 

             2016           MAXI CAST DO           Ot           F17.210

           

NICOTINE DEPENDENCE, CIGARETTES, UNCOMPL                    

 

             2016           MAXI CAST DO           Ot           L02.211

           

CUTANEOUS ABSCESS OF ABDOMINAL WALL                    

 

             2016           MAXI CAST DO           Ot           Z79.4  

         LONG

TERM (CURRENT) USE OF INSULIN                    

 

             2016           MAXI CAST DO           Ot           Z86.14 

          

PERSONAL HISTORY OF METHICILLIN RESIS ST                    

 

                06/10/2016           JOSE ALFREDOSAVANNA SERRATOMARGI S           Ot      

        R10.32     

                          LEFT LOWER QUADRANT PAIN                    

 

             2017                        Ot           789.00           ABD

OMINAL PAIN, 

UNSPECIFIED SITE                                 

 

                2017           SAMANTHA CHAUDHARY APRN           Ot        

      M25.561      

                          PAIN IN RIGHT KNEE                    

 

                2017           SAMANTHA CHAUDHARY APRN           Ot        

      M25.562      

                          PAIN IN LEFT KNEE                    

 

             2017                        Ot           R10.2           PELV

IC AND 

PERINEAL PAIN                                    

 

                2017           PATRICIOMITCH MARGI SERRATO S           Ot      

        R10.32     

                          LEFT LOWER QUADRANT PAIN                    

 

                2017           ABHI ARCHULETA           Ot           

   F17.210         

                          NICOTINE DEPENDENCE, CIGARETTES, UNCOMPL              

      

 

             2017           ABHI ARCHULETA APRN           Ot           J02

.9           

ACUTE PHARYNGITIS, UNSPECIFIED                    

 

             2017           ABHI ARCHULETA           Ot           J06

.9           

ACUTE UPPER RESPIRATORY INFECTION, UNSPE                    

 

                2017           ABHI ARCHULETA APRN           Ot           

   F17.210         

                          NICOTINE DEPENDENCE, CIGARETTES, UNCOMPL              

      

 

             2017           ABHI ARCHULETA           Ot           J02

.9           

ACUTE PHARYNGITIS, UNSPECIFIED                    

 

             2017           ABHI ARCHULETA APRN           Ot           J06

.9           

ACUTE UPPER RESPIRATORY INFECTION, UNSPE                    

 

             03/15/2017                        Ot           789.00           ABD

OMINAL PAIN, 

UNSPECIFIED SITE                                 

 

                03/15/2017           SAMANTHA CHAUDHARY APRN           Ot        

      M25.561      

                          PAIN IN RIGHT KNEE                    

 

                03/15/2017           SAMANTHA CHAUDHARY APRN           Ot        

      M25.562      

                          PAIN IN LEFT KNEE                    

 

             03/15/2017                        Ot           R10.2           PELV

IC AND 

PERINEAL PAIN                                    

 

                03/15/2017           MARGI MAY DO S           Ot      

        R10.32     

                          LEFT LOWER QUADRANT PAIN                    

 

             2017           ABHI ARCHULETA           Ot           F41

.9           

ANXIETY DISORDER, UNSPECIFIED                    

 

                2017           ABHI ARCHULETA APRN           Ot           

   L02.215         

                          CUTANEOUS ABSCESS OF PERINEUM                    

 

             2017           ABHI ARCHULETA APRN           Ot           F41

.9           

ANXIETY DISORDER, UNSPECIFIED                    

 

                2017           ABHI ARCHULETA APRN           Ot           

   L02.215         

                          CUTANEOUS ABSCESS OF PERINEUM                    

 

                2017           ABHI ARCHULETA APRN           Ot           

   L02.215         

                          CUTANEOUS ABSCESS OF PERINEUM                    

 

             2017           ABHI ARCHULETA           Ot           R00

.0           

TACHYCARDIA, UNSPECIFIED                         

 

             2017           ABHI ARCHULETA           Ot           F41

.9           

ANXIETY DISORDER, UNSPECIFIED                    

 

                2017           ABHI ARCHULETA APRN           Ot           

   L02.215         

                          CUTANEOUS ABSCESS OF PERINEUM                    

 

                2017           GUERA MURDOCK MD           Ot          

    M25.561        

                          PAIN IN RIGHT KNEE                    

 

                2017           GUERA MURDOCK MD           Ot          

    M23.8X1        

                          OTHER INTERNAL DERANGEMENTS OF RIGHT KNE              

      

 

                2017           GUERA MURDOCK MD           Ot          

    Z01.818        

                          ENCOUNTER FOR OTHER PREPROCEDURAL EXAMIN              

      

 

                2017           GUERA MURDOCK MD           Ot          

    Z11.2          

                          ENCOUNTER FOR SCREENING FOR OTHER BACTER              

      

 

             2017                        Ot           789.00           ABD

OMINAL PAIN, 

UNSPECIFIED SITE                                 

 

                2017           SAMANTHA CHAUDHARY APRN           Ot        

      M25.561      

                          PAIN IN RIGHT KNEE                    

 

                2017           SAMANTHA CHAUDHARY APRN           Ot        

      M25.562      

                          PAIN IN LEFT KNEE                    

 

             2017                        Ot           R10.2           PELV

IC AND 

PERINEAL PAIN                                    

 

                2017           MARGI MAY DO S           Ot      

        R10.32     

                          LEFT LOWER QUADRANT PAIN                    

 

                2017           GUERA MURDOCK MD           Ot          

    M25.561        

                          PAIN IN RIGHT KNEE                    

 

             2017           ABHI ARCHULETA APRN           Ot           F41

.9           

ANXIETY DISORDER, UNSPECIFIED                    

 

                2017           ABHI ARCHULETA APRN           Ot           

   L02.215         

                          CUTANEOUS ABSCESS OF PERINEUM                    

 

             2018           NAEL SANTOS           Ot           E11.65  

         TYPE

2 DIABETES MELLITUS WITH HYPERGLYCE                    

 

             2018           NAEL SANTOS           Ot           F17.210 

          

NICOTINE DEPENDENCE, CIGARETTES, UNCOMPL                    

 

             2018           NAEL SANTOS           Ot           L02.31  

         

CUTANEOUS ABSCESS OF BUTTOCK                     

 

             2018           NAEL SANTOS           Ot           Z87.19  

         

PERSONAL HISTORY OF OTHER DISEASES OF TH                    

 

             2018           NAEL SANTOS           Ot           Z87.440 

          

PERSONAL HISTORY OF URINARY (TRACT) INFE                    

 

             2018           NAEL SANTOS           Ot           Z88.1   

        

ALLERGY STATUS TO OTHER ANTIBIOTIC AGENT                    

 

             2018           NAEL SANTOS           Ot           Z90.89  

         

ACQUIRED ABSENCE OF OTHER ORGANS                    

 

                2018           SAMANTHA CHAUDHARY APRN           Ot        

      M25.561      

                          PAIN IN RIGHT KNEE                    

 

                2018           SAMANTHA CHAUDHARY APRN           Ot        

      M25.562      

                          PAIN IN LEFT KNEE                    

 

             2018                        Ot           R10.2           PELV

IC AND 

PERINEAL PAIN                                    

 

                2018           MARGI MAY DO S           Ot      

        R10.32     

                          LEFT LOWER QUADRANT PAIN                    

 

                2018           GUERA MURDOCK MD           Ot          

    M25.561        

                          PAIN IN RIGHT KNEE                    

 

           2018                       Ot           701.2                  

           

  

 

           2018                       Ot           277.7                  

           

  

 

           2018                       Ot           474.00                 

           

   

 

           2018                       Ot           V72.83                 

           

   

 

           2018                       Ot           277.7                  

           

  

 

             2018                        Ot           789.00           ABD

OMINAL PAIN, 

UNSPECIFIED SITE                                 

 

                2018           SAMANTHA CHAUDHARY APRN           Ot        

      M25.561      

                          PAIN IN RIGHT KNEE                    

 

                2018           SAMANTHA CHAUDHARY APRN           Ot        

      M25.562      

                          PAIN IN LEFT KNEE                    

 

             2018                        Ot           R10.2           PELV

IC AND 

PERINEAL PAIN                                    

 

                2018           MARGI MAY DO S           Ot      

        R10.32     

                          LEFT LOWER QUADRANT PAIN                    

 

                2018           GUERA MURDOCK MD           Ot          

    M25.561        

                          PAIN IN RIGHT KNEE                    

 

           2018                       Ot           701.2                  

           

  

 

           2018                       Ot           277.7                  

           

  

 

           2018                       Ot           474.00                 

           

   

 

           2018                       Ot           V72.83                 

           

   

 

           2018                       Ot           277.7                  

           

  

 

             2018                        Ot           789.00           ABD

OMINAL PAIN, 

UNSPECIFIED SITE                                 

 

                2018           SAMANTHA CHAUDHARY APRN           Ot        

      M25.561      

                          PAIN IN RIGHT KNEE                    

 

                2018           SAMANTHA CHAUDHARY APRN           Ot        

      M25.562      

                          PAIN IN LEFT KNEE                    

 

             2018                        Ot           R10.2           [facility]

IC AND 

PERINEAL PAIN                                    

 

                2018           MARGI MAY DO S           Ot      

        R10.32     

                          LEFT LOWER QUADRANT PAIN                    

 

                2018           COLLETTE DALTON, GUERA EAGLE           Ot          

    M25.561        

                          PAIN IN RIGHT KNEE                    

 

                2019           SAMANTHA CHAUDHARY APRN           Ot        

      M25.561      

                          PAIN IN RIGHT KNEE                    

 

                2019           SAMANTHA CHAUDHARY APRN           Ot        

      M25.562      

                          PAIN IN LEFT KNEE                    

 

             2019                        Ot           R10.2           [facility]

IC AND 

PERINEAL PAIN                                    

 

                2019           MARGI MAY DO S           Ot      

        R10.32     

                          LEFT LOWER QUADRANT PAIN                    

 

                2019           COLLETTE DALTON, GUERA EAGLE           Ot          

    M25.561        

                          PAIN IN RIGHT KNEE                    

 

                2019           SAMANTHA CHAUDHARY APRN           Ot        

      M25.561      

                          PAIN IN RIGHT KNEE                    

 

                2019           SAMANTHA CHAUDHARY APRN           Ot        

      M25.562      

                          PAIN IN LEFT KNEE                    

 

             2019                        Ot           R10.2           [facility]

IC AND 

PERINEAL PAIN                                    

 

                2019           MARGI MAY DO S           Ot      

        R10.32     

                          LEFT LOWER QUADRANT PAIN                    

 

                2019           COLLETTE DALTON, GUERA EAGLE           Ot          

    M25.561        

                          PAIN IN RIGHT KNEE                    

 

             2019           NAEL SANTOS           Ot           E11.9   

        TYPE 

2 DIABETES MELLITUS WITHOUT COMPLIC                    

 

             2019           NAEL SANTOS           Ot           N39.0   

        

URINARY TRACT INFECTION, SITE NOT SPECIF                    

 

             2019           NAEL SANTOS           Ot           R06.02  

         

SHORTNESS OF BREATH                              

 

             2019           NAEL SANTOS           Ot           Z77.22  

         

CNTCT W AND EXPSR TO ENVIRON TOBACCO SMO                    

 

             2019           NAEL SANTOS           Ot           Z87.19  

         

PERSONAL HISTORY OF OTHER DISEASES OF TH                    

 

             2019           NAEL SANTOS           Ot           Z87.440 

          

PERSONAL HISTORY OF URINARY (TRACT) INFE                    

 

             2019           NAEL SANTOS           Ot           Z88.1   

        

ALLERGY STATUS TO OTHER ANTIBIOTIC AGENT                    

 

             2019           NAEL SANTOS           Ot           Z90.89  

         

ACQUIRED ABSENCE OF OTHER ORGANS                    

 

             2019           NAEL SANTOS           Ot           Z98.890 

          

OTHER SPECIFIED POSTPROCEDURAL STATES                    



                                                                                
                                                                                
                                                                                
                                                                                
                            



Procedures

      



                Code            Description           Performed By           Per

formed On        

 

                                      86.04                                 OTHE

R SKIN   SUBQ I   D       

                                                    2013        

 

                                      96006                                 PSYC

H DIAGNOSTIC EVALUATION   

                                                    2014        

 

                                      08969                                 PSYT

X PT&/FAMILY 45 MINUTES   

                                                    2014        

 

                                      30214                                 PSYT

X PT&/FAMILY 30 MINUTES   

                                                    10/09/2014        

 

                                      89970                                 PSYT

X PT&/FAMILY 45 MINUTES   

                                                    2014        

 

                                      92028                                 PSYT

X PT&/FAMILY 45 MINUTES   

                                                    2014        

 

                                      77378                                 PSYT

X PT&/FAMILY 45 MINUTES   

                                                    2015        



                              



Results

      



                    Test                Result              Range        

 

                                        Complete blood count (CBC) with automate

d white blood cell (WBC) differential - 

17 14:40         

 

                          Blood leukocytes automated count (number/volume)      

     6.3 10*3/uL          

                                        4.3-11.0        

 

                          Blood erythrocytes automated count (number/volume)    

       4.91 10*6/uL       

                                        4.35-5.85        

 

                    Venous blood hemoglobin measurement (mass/volume)           

14.8 g/dL           

11.5-16.0        

 

                    Blood hematocrit (volume fraction)           42 %           

     35-52        

 

                    Automated erythrocyte mean corpuscular volume           86 [

foz_us]           

80-99        

 

                                        Automated erythrocyte mean corpuscular h

emoglobin (mass per erythrocyte)        

                          30 pg                     25-34        

 

                                        Automated erythrocyte mean corpuscular h

emoglobin concentration measurement 

(mass/volume)             35 g/dL                   32-36        

 

                    Automated erythrocyte distribution width ratio           13.

1 %              10.0-

14.5        

 

                    Automated blood platelet count (count/volume)           298 

10*3/uL           

130-400        

 

                          Automated blood platelet mean volume measurement      

     9.6 [foz_us]         

                                        7.4-10.4        

 

                    Automated blood neutrophils/100 leukocytes           64 %   

             42-75       

 

 

                    Automated blood lymphocytes/100 leukocytes           22 %   

             12-44       

 

 

                    Blood monocytes/100 leukocytes           13 %               

 0-12        

 

                    Automated blood eosinophils/100 leukocytes           0 %    

             0-10        

 

                    Automated blood basophils/100 leukocytes           1 %      

           0-10        

 

                    Blood neutrophils automated count (number/volume)           

4.0 10*3            

1.8-7.8        

 

                    Blood lymphocytes automated count (number/volume)           

1.4 10*3            

1.0-4.0        

 

                    Blood monocytes automated count (number/volume)           0.

8 10*3            

0.0-1.0        

 

                    Automated eosinophil count           0.0 10*3/uL           0

.0-0.3        

 

                    Automated blood basophil count (count/volume)           0.0 

10*3/uL           

0.0-0.1        

 

                                        Serum heterophile antibody titer -  14:40         

 

                    Serum heterophile antibody titer           NEGATIVE         

   NEGATIVE        

 

                                        Serum or plasma choriogonadotropin (preg

shannon test) detection - 17 14:40  

       

 

                          Serum or plasma choriogonadotropin (pregnancy test) de

tection           NEGATIVE

                                        NEGATIVE        

 

                                        Comprehensive metabolic panel - 17

 14:40         

 

                          Serum or plasma sodium measurement (moles/volume)     

      137 mmol/L          

                                        135-145        

 

                          Serum or plasma potassium measurement (moles/volume)  

         3.9 mmol/L       

                                        3.6-5.0        

 

                          Serum or plasma chloride measurement (moles/volume)   

        105 mmol/L        

                                                

 

                    Carbon dioxide           20 mmol/L           21-32        

 

                          Serum or plasma anion gap determination (moles/volume)

           12 mmol/L      

                                        5-14        

 

                          Serum or plasma urea nitrogen measurement (mass/volume

)           8 mg/dL       

                                        7-18        

 

                          Serum or plasma creatinine measurement (mass/volume)  

         0.81 mg/dL       

                                        0.60-1.30        

 

                    Serum or plasma urea nitrogen/creatinine mass ratio         

  10                  NRG 

       

 

                                        Serum or plasma creatinine measurement w

ith calculation of estimated glomerular 

filtration rate           >                         NRG        

 

                    Serum or plasma glucose measurement (mass/volume)           

179 mg/dL           

        

 

                    Serum or plasma calcium measurement (mass/volume)           

9.0 mg/dL           

8.5-10.1        

 

                          Serum or plasma total bilirubin measurement (mass/volu

me)           0.5 mg/dL   

                                        0.1-1.0        

 

                                        Serum or plasma alkaline phosphatase niki

surement (enzymatic activity/volume)    

                          50 U/L                            

 

                                        Serum or plasma aspartate aminotransfera

se measurement (enzymatic 

activity/volume)           12 U/L                    5-34        

 

                                        Serum or plasma alanine aminotransferase

 measurement (enzymatic activity/volume)

                          15 U/L                    0-55        

 

                    Serum or plasma protein measurement (mass/volume)           

6.9 g/dL            

6.4-8.2        

 

                    Serum or plasma albumin measurement (mass/volume)           

4.1 g/dL            

3.2-4.5        

 

                                        Influenza virus A and B antigen detectio

n - 17 15:00         

 

                    FLU RESULT           NEGATIVE FOR INFLUENZA A AND B ANTIGENS

 BY IA            

NRG        

 

                                        Gram stain microscopy - 17 22:37  

       

 

                    GRAM STAIN RESULT           FEW GRAM NEGATIVE RODS          

  NRG        

 

                                        Bacteria identification in wound by cult

ure - 17 22:37         

 

                    Bacteria identification in wound by culture           [id number]

[id number]            NRG  

      

 

                    FREE TEXT EXTERNAL           (ANAEROBIC GRAM NEGATIVE EDEN)  

          NRG       

 

 

                    QUANTITY OF GROWTH           Isolated            NRG        

 

                    FREE TEXT ENTRY 2           SENSITIVITY IS REQUESTED ON THIS

 ISOLATE            

NRG        

 

                                        Complete blood count (CBC) with automate

d white blood cell (WBC) differential - 

17 22:50         

 

                          Blood leukocytes automated count (number/volume)      

     11.4 10*3/uL         

                                        4.3-11.0        

 

                          Blood erythrocytes automated count (number/volume)    

       4.55 10*6/uL       

                                        4.35-5.85        

 

                    Venous blood hemoglobin measurement (mass/volume)           

13.8 g/dL           

11.5-16.0        

 

                    Blood hematocrit (volume fraction)           40 %           

     35-52        

 

                    Automated erythrocyte mean corpuscular volume           87 [

foz_us]           

80-99        

 

                                        Automated erythrocyte mean corpuscular h

emoglobin (mass per erythrocyte)        

                          30 pg                     25-34        

 

                                        Automated erythrocyte mean corpuscular h

emoglobin concentration measurement 

(mass/volume)             35 g/dL                   32-36        

 

                    Automated erythrocyte distribution width ratio           13.

2 %              10.0-

14.5        

 

                    Automated blood platelet count (count/volume)           310 

10*3/uL           

130-400        

 

                          Automated blood platelet mean volume measurement      

     9.4 [foz_us]         

                                        7.4-10.4        

 

                    Automated blood neutrophils/100 leukocytes           65 %   

             42-75       

 

 

                    Automated blood lymphocytes/100 leukocytes           27 %   

             12-44       

 

 

                    Blood monocytes/100 leukocytes           8 %                

 0-12        

 

                    Automated blood eosinophils/100 leukocytes           1 %    

             0-10        

 

                    Automated blood basophils/100 leukocytes           0 %      

           0-10        

 

                    Blood neutrophils automated count (number/volume)           

7.4 10*3            

1.8-7.8        

 

                    Blood lymphocytes automated count (number/volume)           

3.0 10*3            

1.0-4.0        

 

                    Blood monocytes automated count (number/volume)           0.

9 10*3            

0.0-1.0        

 

                    Automated eosinophil count           0.1 10*3/uL           0

.0-0.3        

 

                    Automated blood basophil count (count/volume)           0.0 

10*3/uL           

0.0-0.1        

 

                                        Complete blood count (CBC) with automate

d white blood cell (WBC) differential - 

17 12:05         

 

                          Blood leukocytes automated count (number/volume)      

     7.7 10*3/uL          

                                        4.3-11.0        

 

                          Blood erythrocytes automated count (number/volume)    

       4.59 10*6/uL       

                                        4.35-5.85        

 

                    Venous blood hemoglobin measurement (mass/volume)           

13.8 g/dL           

11.5-16.0        

 

                    Blood hematocrit (volume fraction)           40 %           

     35-52        

 

                    Automated erythrocyte mean corpuscular volume           88 [

foz_us]           

80-99        

 

                                        Automated erythrocyte mean corpuscular h

emoglobin (mass per erythrocyte)        

                          30 pg                     25-34        

 

                                        Automated erythrocyte mean corpuscular h

emoglobin concentration measurement 

(mass/volume)             34 g/dL                   32-36        

 

                    Automated erythrocyte distribution width ratio           13.

2 %              10.0-

14.5        

 

                    Automated blood platelet count (count/volume)           277 

10*3/uL           

130-400        

 

                          Automated blood platelet mean volume measurement      

     9.8 [foz_us]         

                                        7.4-10.4        

 

                    Automated blood neutrophils/100 leukocytes           61 %   

             42-75       

 

 

                    Automated blood lymphocytes/100 leukocytes           31 %   

             12-44       

 

 

                    Blood monocytes/100 leukocytes           7 %                

 0-12        

 

                    Automated blood eosinophils/100 leukocytes           1 %    

             0-10        

 

                    Automated blood basophils/100 leukocytes           1 %      

           0-10        

 

                    Blood neutrophils automated count (number/volume)           

4.7 10*3            

1.8-7.8        

 

                    Blood lymphocytes automated count (number/volume)           

2.3 10*3            

1.0-4.0        

 

                    Blood monocytes automated count (number/volume)           0.

5 10*3            

0.0-1.0        

 

                    Automated eosinophil count           0.1 10*3/uL           0

.0-0.3        

 

                    Automated blood basophil count (count/volume)           0.1 

10*3/uL           

0.0-0.1        

 

                                        Whole blood basic metabolic panel - 04/0

 12:05         

 

                          Serum or plasma sodium measurement (moles/volume)     

      134 mmol/L          

                                        135-145        

 

                          Serum or plasma potassium measurement (moles/volume)  

         4.7 mmol/L       

                                        3.6-5.0        

 

                          Serum or plasma chloride measurement (moles/volume)   

        103 mmol/L        

                                                

 

                    Carbon dioxide           21 mmol/L           21-32        

 

                          Serum or plasma anion gap determination (moles/volume)

           10 mmol/L      

                                        5-14        

 

                          Serum or plasma urea nitrogen measurement (mass/volume

)           11 mg/dL      

                                        7-18        

 

                          Serum or plasma creatinine measurement (mass/volume)  

         0.88 mg/dL       

                                        0.60-1.30        

 

                    Serum or plasma urea nitrogen/creatinine mass ratio         

  13                  NRG 

       

 

                                        Serum or plasma creatinine measurement w

ith calculation of estimated glomerular 

filtration rate           >                         NRG        

 

                    Serum or plasma glucose measurement (mass/volume)           

319 mg/dL           

        

 

                    Serum or plasma calcium measurement (mass/volume)           

9.1 mg/dL           

8.5-10.1        

 

                                        Methicillin resistant Staphylococcus aur

eus (MRSA) screening culture - 17 

09:16         

 

                          Methicillin resistant Staphylococcus aureus (MRSA) scr

eening culture           

NEG                                     NRG        

 

                                        CULTURE, URINE - 18 15:18         

 

                    CULTURE, URINE, ROUTINE           SEE NOTE            NRG   

     

 

                                        Gram stain microscopy - 18 20:20  

       

 

                    Gram stain microscopy           Mixed Bacterial Ashlee       

     NRG        

 

                                        Bacteria identification in wound by cult

ure - 18 20:20         

 

                    Bacteria identification in wound by culture           SEE CO

MMEN            NRG 

       

 

                    QUANTITY OF GROWTH           .                   NRG        

 

                                        Complete blood count (CBC) with automate

d white blood cell (WBC) differential - 

18 20:45         

 

                          Blood leukocytes automated count (number/volume)      

     11.9 10*3/uL         

                                        4.3-11.0        

 

                          Blood erythrocytes automated count (number/volume)    

       4.33 10*6/uL       

                                        4.35-5.85        

 

                    Venous blood hemoglobin measurement (mass/volume)           

13.4 g/dL           

11.5-16.0        

 

                    Blood hematocrit (volume fraction)           37 %           

     35-52        

 

                    Automated erythrocyte mean corpuscular volume           86 [

foz_us]           

80-99        

 

                                        Automated erythrocyte mean corpuscular h

emoglobin (mass per erythrocyte)        

                          31 pg                     25-34        

 

                                        Automated erythrocyte mean corpuscular h

emoglobin concentration measurement 

(mass/volume)             36 g/dL                   32-36        

 

                    Automated erythrocyte distribution width ratio           12.

7 %              10.0-

14.5        

 

                    Automated blood platelet count (count/volume)           341 

10*3/uL           

130-400        

 

                          Automated blood platelet mean volume measurement      

     9.5 [foz_us]         

                                        7.4-10.4        

 

                    Automated blood neutrophils/100 leukocytes           72 %   

             42-75       

 

 

                    Automated blood lymphocytes/100 leukocytes           22 %   

             12-44       

 

 

                    Blood monocytes/100 leukocytes           5 %                

 0-12        

 

                    Automated blood eosinophils/100 leukocytes           0 %    

             0-10        

 

                    Automated blood basophils/100 leukocytes           0 %      

           0-10        

 

                    Blood neutrophils automated count (number/volume)           

8.5 10*3            

1.8-7.8        

 

                    Blood lymphocytes automated count (number/volume)           

2.7 10*3            

1.0-4.0        

 

                    Blood monocytes automated count (number/volume)           0.

6 10*3            

0.0-1.0        

 

                    Automated eosinophil count           0.1 10*3/uL           0

.0-0.3        

 

                    Automated blood basophil count (count/volume)           0.1 

10*3/uL           

0.0-0.1        

 

                                        Blood lactic acid measurement (moles/vol

ume) - 18 20:45         

 

                    Blood lactic acid measurement (moles/volume)           1.24 

mmol/L           

0.50-2.00        

 

                                        Comprehensive metabolic panel - 18

 20:45         

 

                          Serum or plasma sodium measurement (moles/volume)     

      136 mmol/L          

                                        135-145        

 

                          Serum or plasma potassium measurement (moles/volume)  

         3.9 mmol/L       

                                        3.6-5.0        

 

                          Serum or plasma chloride measurement (moles/volume)   

        103 mmol/L        

                                                

 

                    Carbon dioxide           21 mmol/L           21-32        

 

                          Serum or plasma anion gap determination (moles/volume)

           12 mmol/L      

                                        5-14        

 

                          Serum or plasma urea nitrogen measurement (mass/volume

)           13 mg/dL      

                                        7-18        

 

                          Serum or plasma creatinine measurement (mass/volume)  

         0.89 mg/dL       

                                        0.60-1.30        

 

                    Serum or plasma urea nitrogen/creatinine mass ratio         

  15                  NRG 

       

 

                                        Serum or plasma creatinine measurement w

ith calculation of estimated glomerular 

filtration rate           >                         NRG        

 

                    Serum or plasma glucose measurement (mass/volume)           

300 mg/dL           

        

 

                    Serum or plasma calcium measurement (mass/volume)           

9.2 mg/dL           

8.5-10.1        

 

                          Serum or plasma total bilirubin measurement (mass/volu

me)           0.2 mg/dL   

                                        0.1-1.0        

 

                                        Serum or plasma alkaline phosphatase niki

surement (enzymatic activity/volume)    

                          59 U/L                            

 

                                        Serum or plasma aspartate aminotransfera

se measurement (enzymatic 

activity/volume)           9 U/L                     5-34        

 

                                        Serum or plasma alanine aminotransferase

 measurement (enzymatic activity/volume)

                          13 U/L                    0-55        

 

                    Serum or plasma protein measurement (mass/volume)           

6.4 g/dL            

6.4-8.2        

 

                    Serum or plasma albumin measurement (mass/volume)           

4.0 g/dL            

3.2-4.5        

 

                    CALCIUM CORRECTED           9.2 mg/dL           8.5-10.1    

    

 

                                        Bacterial blood culture - 18 20:45

         

 

                    QUANTITY OF GROWTH           .                   NRG        

 

                    Bacterial blood culture           SEE COMMEN            NRG 

       

 

                                        Bacterial blood culture - 18 21:07

         

 

                    Bacterial blood culture           NG                  NRG   

     

 

                                        Capillary blood glucose measurement by g

lucometer (mass/volume) - 18 22:00

         

 

                          Capillary blood glucose measurement by glucometer (mas

s/volume)           144 

mg/dL                                           

 

                                        Complete urinalysis with reflex to cultu

re - 19 18:25         

 

                    Urine color determination           YELLOW              NRG 

       

 

                    Urine clarity determination           VERY CLOUDY           

 NRG        

 

                    Urine pH measurement by test strip           5              

     5-9        

 

                    Specific gravity of urine by test strip           1.025     

          1.016-1.022  

      

 

                    Urine protein assay by test strip, semi-quantitative        

   2+                  

NEGATIVE        

 

                    Urine glucose detection by automated test strip           4+

                  NEGATIVE

        

 

                          Erythrocytes detection in urine sediment by light micr

oscopy           3+       

                                        NEGATIVE        

 

                    Urine ketones detection by automated test strip           1+

                  NEGATIVE

        

 

                    Urine nitrite detection by test strip           POSITIVE    

        NEGATIVE    

    

 

                    Urine total bilirubin detection by test strip           NEGA

TIVE            

NEGATIVE        

 

                          Urine urobilinogen measurement by automated test strip

 (mass/volume)           

NORMAL                                  NORMAL        

 

                    Urine leukocyte esterase detection by dipstick           3+ 

                 NEGATIVE 

       

 

                                        Automated urine sediment erythrocyte cou

nt by microscopy (number/high power 

field)                     [HPF]                    NRG        

 

                                        Automated urine sediment leukocyte count

 by microscopy (number/high power field)

                          TNTC                      NRG        

 

                          Bacteria detection in urine sediment by light microsco

py           LARGE        

                                        NRG        

 

                                        Squamous epithelial cells detection in u

rine sediment by light microscopy       

                          NONE                      NRG        

 

                          Crystals detection in urine sediment by light microsco

py           NONE         

                                        NRG        

 

                    Casts detection in urine sediment by light microscopy       

    NONE                

NRG        

 

                          Mucus detection in urine sediment by light microscopy 

          NEGATIVE        

                                        NRG        

 

                    Complete urinalysis with reflex to culture           YES    

             NRG        

 

                                        Bacterial urine culture - 19 18:25

         

 

                    Bacterial urine culture           [id number]            NRG  

      

 

                    COLONY COUNT           >100,000/ML            NRG        

 

                    FTX;REPORTABLE           SENSITIVITY REPORTED 3/19/19 11:05 

           NRG      

  

 

                    FREE TEXT ENTRY 2           ID REPORTED 3/18/19 17:05       

     NRG        

 

                                        RML Sensitivity Panel - 19 18:25  

       

 

                          Gentamicin susceptibility test by minimum inhibitory c

oncentration           <= 

                                        NRG        

 

                                        Trimethoprim/sulfamethoxazole susceptibi

lity test by minimum 

inhibitoryconcentration           =                         NRG        

 

                          Levofloxacin susceptibility test by minimum inhibitory

 concentration           

<=                                      NRG        

 

                          Ampicillin susceptibility test by minimum inhibitory c

oncentration           <= 

                                        NRG        

 

                          Cefazolin susceptibility test by minimum inhibitory co

ncentration           <=  

                                        NRG        

 

                          Ceftriaxone susceptibility test by minimum inhibitory 

concentration           <=

                                        NRG        

 

                          Ciprofloxacin susceptibility test by minimum inhibitor

y concentration           

<=                                      NRG        

 

                          Meropenem susceptibility test by minimum inhibitory co

ncentration           <=  

                                        NRG        

 

                                        Nitrofurantoin susceptibility test by mi

nimum inhibitory concentration          

                          <=                        NRG        

 

                    Amoxicillin and clavulanate potassium susc KAYLEY           <= 

                 NRG      

  

 

                                        Complete blood count (CBC) with automate

d white blood cell (WBC) differential - 

19 19:00         

 

                          Blood leukocytes automated count (number/volume)      

     11.4 10*3/uL         

                                        4.3-11.0        

 

                          Blood erythrocytes automated count (number/volume)    

       4.78 10*6/uL       

                                        4.35-5.85        

 

                    Venous blood hemoglobin measurement (mass/volume)           

14.6 g/dL           

11.5-16.0        

 

                    Blood hematocrit (volume fraction)           42 %           

     35-52        

 

                    Automated erythrocyte mean corpuscular volume           88 [

foz_us]           

80-99        

 

                                        Automated erythrocyte mean corpuscular h

emoglobin (mass per erythrocyte)        

                          31 pg                     25-34        

 

                                        Automated erythrocyte mean corpuscular h

emoglobin concentration measurement 

(mass/volume)             35 g/dL                   32-36        

 

                    Automated erythrocyte distribution width ratio           12.

7 %              10.0-

14.5        

 

                    Automated blood platelet count (count/volume)           381 

10*3/uL           

130-400        

 

                          Automated blood platelet mean volume measurement      

     9.8 [foz_us]         

                                        7.4-10.4        

 

                    Automated blood neutrophils/100 leukocytes           66 %   

             42-75       

 

 

                    Automated blood lymphocytes/100 leukocytes           24 %   

             12-44       

 

 

                    Blood monocytes/100 leukocytes           9 %                

 0-12        

 

                    Automated blood eosinophils/100 leukocytes           0 %    

             0-10        

 

                    Automated blood basophils/100 leukocytes           0 %      

           0-10        

 

                    Blood neutrophils automated count (number/volume)           

7.6 10*3            

1.8-7.8        

 

                    Blood lymphocytes automated count (number/volume)           

2.8 10*3            

1.0-4.0        

 

                    Blood monocytes automated count (number/volume)           1.

1 10*3            

0.0-1.0        

 

                    Automated eosinophil count           0.0 10*3/uL           0

.0-0.3        

 

                    Automated blood basophil count (count/volume)           0.0 

10*3/uL           

0.0-0.1        

 

                                        Serum or plasma choriogonadotropin (preg

shannon test) detection - 19 19:00  

       

 

                          Serum or plasma choriogonadotropin (pregnancy test) de

tection           NEGATIVE

                                        NEGATIVE        

 

                                        Comprehensive metabolic panel - 19

 19:00         

 

                          Serum or plasma sodium measurement (moles/volume)     

      137 mmol/L          

                                        135-145        

 

                          Serum or plasma potassium measurement (moles/volume)  

         3.6 mmol/L       

                                        3.6-5.0        

 

                          Serum or plasma chloride measurement (moles/volume)   

        102 mmol/L        

                                                

 

                    Carbon dioxide           22 mmol/L           21-32        

 

                          Serum or plasma anion gap determination (moles/volume)

           13 mmol/L      

                                        5-14        

 

                          Serum or plasma urea nitrogen measurement (mass/volume

)           7 mg/dL       

                                        7-18        

 

                          Serum or plasma creatinine measurement (mass/volume)  

         0.87 mg/dL       

                                        0.60-1.30        

 

                    Serum or plasma urea nitrogen/creatinine mass ratio         

  8                   NRG  

      

 

                                        Serum or plasma creatinine measurement w

ith calculation of estimated glomerular 

filtration rate           >                         NRG        

 

                    Serum or plasma glucose measurement (mass/volume)           

315 mg/dL           

        

 

                    Serum or plasma calcium measurement (mass/volume)           

9.6 mg/dL           

8.5-10.1        

 

                          Serum or plasma total bilirubin measurement (mass/volu

me)           0.7 mg/dL   

                                        0.1-1.0        

 

                                        Serum or plasma alkaline phosphatase niki

surement (enzymatic activity/volume)    

                          70 U/L                            

 

                                        Serum or plasma aspartate aminotransfera

se measurement (enzymatic 

activity/volume)           10 U/L                    5-34        

 

                                        Serum or plasma alanine aminotransferase

 measurement (enzymatic activity/volume)

                          16 U/L                    0-55        

 

                    Serum or plasma protein measurement (mass/volume)           

7.1 g/dL            

6.4-8.2        

 

                    Serum or plasma albumin measurement (mass/volume)           

4.3 g/dL            

3.2-4.5        

 

                    CALCIUM CORRECTED           9.4 mg/dL           8.5-10.1    

    

 

                                        Serum or plasma amylase measurement (enz

ymatic activity/volume) - 19 19:00

         

 

                          Serum or plasma amylase measurement (enzymatic activit

y/volume)           36 U/L

                                                

 

                                        Lipase - 19 19:00         

 

                    Lipase              18 U/L              8-78        

 

                                        Capillary blood glucose measurement by g

lucometer (mass/volume) - 19 21:02

         

 

                          Capillary blood glucose measurement by glucometer (mas

s/volume)           245 

mg/dL                                           

 

                                        TSH w/ FREE T4 - 19 11:52         

 

                    TSH                 1.24 mIU/L           NRG        

 

                    T4, FREE            1.1 ng/dL           0.8-1.4        

 

                                        CMP - 19 11:52         

 

                    GLUCOSE             304 mg/dL           65-99        

 

                    UREA NITROGEN (BUN)           12 mg/dL            7-25      

  

 

                    CREATININE           0.76 mg/dL           0.50-1.10        

 

                    eGFR NON-AFR. AMERICAN           113 mL/min/1.73m2          

 > OR = 60        

 

                    eGFR            131 mL/min/1.73m2           

> OR = 60        

 

                    BUN/CREATININE RATIO           NOT APPLICABLE (calc)        

   6-22        

 

                    SODIUM              136 mmol/L           135-146        

 

                    POTASSIUM           4.7 mmol/L           3.5-5.3        

 

                    CHLORIDE            100 mmol/L                   

 

                    CARBON DIOXIDE           28 mmol/L           20-32        

 

                    CALCIUM             9.9 mg/dL           8.6-10.2        

 

                    PROTEIN, TOTAL           6.8 g/dL            6.1-8.1        

 

                    ALBUMIN             4.3 g/dL            3.6-5.1        

 

                    GLOBULIN            2.5 g/dL (calc)           1.9-3.7       

 

 

                    ALBUMIN/GLOBULIN RATIO           1.7 (calc)           1.0-2.

5        

 

                    BILIRUBIN, TOTAL           0.2 mg/dL           0.2-1.2      

  

 

                    ALKALINE PHOSPHATASE           53 U/L                 

     

 

                    AST                 9 U/L               10-30        

 

                    ALT                 10 U/L              6-29        



                                                                            



Encounters

      



                ACCT No.           Visit Date/Time           Discharge          

 Status         

             Pt. Type           Provider           Facility           Loc./Unit 

          

Complaint        

 

                [id number]           2015 18:04:00           2015 23:59:

59           CLS

                Outpatient           [facility]                    

         

                                                 

 

                [id number]           2015 14:15:00           2015 23:59:

59           CLS

                Outpatient           [facility]                     

         

                                                 

 

                [id number]           2014 15:56:00           2014 23:59:

59           CLS

                Outpatient           [facility]                    

         

                                                 

 

                [id number]           2014 12:59:00           2014 23:59:

59           CLS

                Outpatient           [facility]                    

         

                                                 

 

                [id number]           10/09/2014 14:03:00           10/09/2014 23:59:

59           CLS

                Outpatient           [facility]                   

         

                                                 

 

                [id number]           2014 16:01:00           2014 23:59:

59           CLS

                Outpatient           [facility]                   

         

                                                 

 

                [id number]           2014 12:50:00           2014 23:59:

59           CLS

                Outpatient           [facility]                       

         

                                                 

 

                [id number]           2011 00:00:00           2011 23:59:

59           CLS

                Outpatient           DEISI NAYLOR DO                           

         

                                                 

 

                [id number]           2020 15:30:00           2020 23:59:5

9           CLS 

                Outpatient           NEGIN KUMAR                        

   [facility]                                 

 

             [id number]           2019 10:00:00                              

       Document

 Registration                                                                   

 

 

             [id number]           2018 14:00:00                              

       Document

 Registration                                                                   

 

 

                2018 06:13:01           2018 23:59:

59           CLS

                Outpatient           Margi MayJanna                     

         

                                                 

 

                    [id number]           2019 18:12:00           

019 22:44:00        

                DIS             Outpatient           NAEL SANTOS [facility]           ER                        STOMACH PAIN/SOB       

 

 

                    [id number]           2018 18:44:00           

018 22:10:00        

                DIS             Emergency           NAEL SANTOS           [facility]

 [facility]           ER                        BOIL IN BETWEEN LEGS   

     

 

                    [id number]           2017 10:00:00           

017 23:59:59        

                CLS             Preadmit           GUERA MURDOCK MD         

  [facility]           SDC                       RIGHT KNEE TORN MEDIAL 

MENISCUS    

    

 

                    [id number]           2017 08:58:00           

017 09:20:00        

                DIS             Outpatient           GUERA MURDOCK MD       

    [facility]           PREOP                     RIGHT KNEE TORN MEDICA

L MENISCUS

        

 

                    [id number]           2017 11:42:00           

017 12:48:00        

                DIS             Emergency           ABHI ARCHULETA         

  [facility]           ER                        WOUND CHECK        

 

                    [id number]           2017 22:01:00           

017 23:23:00        

                DIS             Emergency           ABHI ARCHULETA         

  [facility]           ER                        VAGINAL BOIL        

 

                    [id number]           2017 13:19:00           

017 23:59:59        

                CLS             Outpatient           GUERA MURDOCK MD       

    [facility]           RAD                       SYMPTOMATIC MEDIAL PIC

CA        

 

                    [id number]           2017 14:31:00           

017 16:00:00        

                DIS             Emergency           ABHI ARCHULETA APRN         

  [facility]           ER                        COLD SYMPTOMS        

 

                    [id number]           2016 16:48:00           

016 19:00:00        

                DIS             Emergency           MAXI CAST DO [facility]           ER                        SORE ON STOMACH        

 

                    [id number]           2016 12:59:00           

016 23:59:59        

                CLS             Outpatient           JOSE ALFREDONDER MARGI SERRATO   

        [facility] 

[facility]           RAD                       LOWER ABDOMINAL

 PAIN       

 

 

                    [id number]           03/10/2016 14:07:00           03/10/2

016 23:59:59        

                CLS             Outpatient           SAMANTHA CHAUDHARY     

      [facility] 

[facility]           RAD                       BILATERAL KNEE 

PAIN        

 

                    [id number]           2014 17:18:00           

014 23:59:59        

                CLS             Outpatient           COLTHARP NORA SERRATO A        

   [facility]           QUICK                     LEFT ANKLE PAIN        

 

                    [id number]           2013 19:34:00           

013 23:59:59        

                CLS             Outpatient           LIBIA RAMIRES     

      [facility] 

[facility]           QUICK                     EAR ACHE, SORE 

THROAT    

    

 

             [id number]           2018 06:50:00                         

            

Document Registration                                                           

         

 

             [id number]           2018 06:50:00                         

            

Document Registration                                                           

         

 

             [id number]           2018 06:50:00                         

            

Document Registration                                                           

         

 

             [id number]           2018 06:50:00                         

            

Document Registration                                                           

         

 

             [id number]           2018 06:50:00                         

            

Document Registration                                                           

         

 

             [id number]           2016 11:47:00                         

            

Document Registration                                                           

         

 

             [id number]           2013 12:15:00                         

            

Document Registration                                                           

         

 

             [id number]           2012 08:34:00                         

            

Document Registration                                                           

         

 

             [id number]           2011 20:11:00                         

            

Document Registration                                                           

         

 

             [id number]           2011 22:52:00                         

            

Document Registration                                                           

         

 

             [id number]           2010 17:21:00                         

            

Document Registration                                                           

         

 

             [id number]           2008 09:23:00                         

            

Document Registration                                                           

         

 

             [id number]           10/10/2007 10:27:00                         

            

Document Registration                                                           

         

 

             [id number]           2007 11:48:00                         

            

Document Registration

## 2020-06-13 NOTE — XMS REPORT
[facility]

                             Created on: 07/15/2019



Melanie Carroll

External Reference #: [id number]

: 2000

Sex: Female



Demographics





                          Address                   [address] [place]  [address]

 

                          Preferred Language        Unknown

 

                          Marital Status            Unknown

 

                          Church Affiliation     Unknown

 

                          Race                      Unknown

 

                          Ethnic Group              Unknown





Author





                          Author                    Melanie DE LEON

 

                          Organization              [facility]

 

                          Address                   [address] [place]  [address]



 

                          Phone                     [phone]







Care Team Providers





                    Care Team Member Name Role                Phone

 

                    MOISES  ABILIO   Unavailable         [phone]







PROBLEMS





          Type      Condition ICD9-CM Code OYN71-FX Code Onset Dates Condition S

tatus SNOMED 

Code

 

          Problem   Attention deficit disorder           F90.0               Act

wilmar    [id number]

 

          Problem   Anxiety disorder, unspecified           F41.9               

Active    [id number]

 

          Problem   Dysthymic disorder           F34.1               Active    7

[id number]

 

                          Problem                   Type 2 diabetes mellitus wit

hout complication, without long-term current

use of insulin              E11.9                     Active       [id number]

 

                          Problem                   Severe episode of recurrent 

major depressive disorder, without psychotic

features                  F33.2                     Active       65668202

 

          Problem   Social phobia           F40.10              Active    181478

02

 

          Problem   Depressive disorder, not elsewhere classified           F32.

9               Active    05438200

 

          Problem   Mood disorder           F39                 Active    062643

05

 

             Problem      Allergic rhinitis, unspecified seasonality, unspecifie

d trigger              J30.9        

                          Active                    [id number]







ALLERGIES

No Information



ENCOUNTERS





                Encounter       Location        Date            Diagnosis

 

                          [facility]   [address] [facility] [id number]

[address] [place] [phone]

                          07 Aug, 2019               

 

                          [facility]     [address] [id number]

[address] [place] [phone]

                                         

 

                          [facility]     [address] [id number]

[address] [place] [phone]

                                        Severe episode of recurrent 

major depressive disorder, without 

psychotic features F33.2 and Type 2 diabetes mellitus without complication, 
without long-term current use of insulin E11.9

 

                          [facility]  [address] [id number]

[address] [place] [address] 

[phone]                12 Mar, 2019               

 

                          [facility]  [address] [id number]

[address] [place] [address] 

[phone]                12 Mar, 2019              Viral upper respiratory trac

t infection J06.9

 

                          [facility]     [address] [id number]

[address] [place] [phone]

                          [place], 2019              Vaginal discharge N89.8

 

                          [facility]     [address] [id number]

[address] [place] [phone]

                          25 Oct, 2018               

 

                          [facility]     [address] [facility] [id number]

[address] [place] [phone]

                          04 Oct, 2018              Dysuria R30.0 ; Sore throat 

J02.9 ; Allergic rhinitis, unspecified 

seasonality, unspecified trigger J30.9 and Vaginal candida B37.3

 

                          [facility]     [address] [id number]

[address] [place] [phone]

                          06 Sep, 2018              Cutaneous abscess of buttock

 L02.31

 

                          [facility]  [address] [facility] [id number]

[address] [place] [address] 

[phone]                               

 

                          [facility]     [address] [facility] [id number]

[address] [place] [phone]

                                         

 

                          [facility]     [address] [facility] [id number]

[address] [place] [phone]

                                        Cystitis N30.90 and Dysuria 

R30.0

 

                          [facility]   [address] [id number]

[address] [place] [phone]

                          15 2018              Dental examination Z01.20

 

                          [facility]  [address] [facility] [id number]

[address] [place] [address] 

[phone]                15 [place], 2018              Fever, unspecified fever cau

se R50.9 and Acute 

nasopharyngitis J00

 

                          [facility]     [address] [id number]

[address] [place] [phone]

                          31 Aug, 2017              Mood disorder F39

 

                          [facility]     [address] [facility] [id number]

[address] [place] [phone]

                          21 Aug, 2017              Mood disorder F39

 

                          [facility]     [address] [facility] [id number]

[address] [place] [phone]

                          22 Mar, 2017              Depressive disorder, not els

ewhere classified F32.9 and Social 

phobia F40.10

 

                          [facility]   [address] [id number]

[address] [place] [phone]

                          22 Mar, 2016              Encounter for dental examina

tion Z01.20

 

                          [facility]     [address] [id number]

[address] [place] [phone]

                          29 Sep, 2015              Depressive disorder, not els

ewhere classified 311 ; Anxiety state, 

unspecified 300.00 and Attention deficit disorder without mention of 
hyperactivity 314.00

 

                          [facility]     [address] [id number]

[address] [place] [phone]

                          01 Sep, 2015              Depressive disorder, not els

ewhere classified 311 ; Anxiety state, 

unspecified 300.00 and Attention deficit disorder without mention of 
hyperactivity 314.00

 

                          [facility]     [address] [id number]

[address] [place] [phone]

                                        Depressive disorder, not els

ewhere classified 311

 

                          [facility]     [address] [id number]

[address] [place] [phone]

                          13 May, 2015              Depressive disorder, not els

ewhere classified 311

 

                          [facility]     [address] [id number]

[address] [place] [phone]

                                         

 

                          [facility]     [address] [id number]

[address] [place] [phone]

                                         

 

                          [facility]     [address] [id number]

[address] [place] [phone]

                                         

 

                          [facility]     [address] [id number]

[address] [place] [phone]

                          13 Mar, 2015               

 

                          [facility]     [address] [id number]

[address] [place] [phone]

                          13 Mar, 2015               

 

                          [facility]     [address] [id number]

[address] [place] [phone]

                                         

 

                          [facility]     [address] [id number]

[address] [place] [phone]

                                         

 

                          [facility]     [address] [id number]

[address] [place] [phone]

                          12 Dec, 2014               

 

                          [facility]     [address] [id number]

[address] [place] [phone]

                          12 Dec, 2014               

 

                          [facility]     [address] [id number]

[address] [place] [phone]

                                         

 

                          [facility]     [address] [id number]

[address] [place] [phone]

                                         

 

                          [facility]     [address] [id number]

[address] [place] [phone]

                          09 Oct, 2014               

 

                          [facility]     [address] [id number]

[address] [place] [phone]

                          09 Oct, 2014               

 

                          [facility]     [address] [id number]

[address] [place] [phone]

                          22 Aug, 2014               

 

                          [facility]     [address] [id number]

[address] [place] [phone]

                          22 Aug, 2014               

 

                          [facility]     [address] [id number]

[address] [place] [phone]

                          10 Jul, 2014               

 

                          [facility]     [address] [id number]

[address] [place] [phone]

                          10 Jul, 2014               

 

                          [facility]     [address] [id number]

[address] [place] [phone]

                          23 May, 2014               

 

                          [facility]     [address] [id number]

[address] [place] [phone]

                          23 May, 2014               

 

                          [facility]     [address] [id number]

[address] [place] [phone]

                                         

 

                          [facility]     [address] [id number]

[address] [place] [phone]

                          05 Dec, 2012               

 

                          [facility]     [address] [id number]

[address] [place] [phone]

                          05 Dec, 2012               

 

                          [facility]     [address] [id number]

[address] [place] [phone]

                          17 Dec, 2008               







IMMUNIZATIONS

No Known Immunizations



SOCIAL HISTORY

Never Assessed



REASON FOR VISIT





PLAN OF CARE





VITAL SIGNS





MEDICATIONS

Unknown Medications



RESULTS

No Results



PROCEDURES





                Procedure       Date Ordered    Result          Body Site

 

                PSYTX PT&/FAMILY 45 MINUTES 2015                     







INSTRUCTIONS





MEDICATIONS ADMINISTERED

No Known Medications



MEDICAL (GENERAL) HISTORY





                    Type                Description         Date

 

                    Medical History     Diabetes-II          

 

                    Surgical History    Tonsils removed     

 

                    Surgical History    Blood infection     

 

                    Hospitalization History ER visit for boil   2018

## 2020-06-13 NOTE — XMS REPORT
[facility]

                             Created on: 2018



Melanie Carroll

External Reference #: [id number]

: 2000

Sex: Female



Demographics





                          Address                   [address] [place]  [address]

 

                          Preferred Language        Unknown

 

                          Marital Status            Unknown

 

                          Alevism Affiliation     Unknown

 

                          Race                      Unknown

 

                          Ethnic Group              Unknown





Author





                          Author                    Emilia BENAVIDEZlyn DEANNE

 

                          [facility]

 

                          Address                   [address] [place]  [address]



 

                          Phone                     [phone]







Care Team Providers





                    Care Team Member Name Role                Phone

 

                    DEANNE BENAVIDEZ       Unavailable         [phone]







PROBLEMS





          Type      Condition ICD9-CM Code GIL22-BE Code Onset Dates Condition S

tatus SNOMED 

Code

 

          Problem   Attention deficit disorder           F90.0               Act

wilmar    [id number]

 

          Problem   Mood disorder           F39                 Active    503324

05

 

          Problem   Depressive disorder, not elsewhere classified           F32.

9               Active    [id number]

 

          Problem   Dysthymic disorder           F34.1               Active    7

[id number]

 

          Problem   Anxiety disorder, unspecified           F41.9               

Active    [id number]

 

          Problem   Social phobia           F40.10              Active    171342

02

 

             Problem      Other and unspecified noninfectious gastroenteritis an

d colitis 558.9                     

                          Active                    [id number]







ALLERGIES





             Substance    Reaction     Event Type   Date         Status

 

             Rocephin     Unknown      Drug Allergy 06 Sep, 2018 Active







ENCOUNTERS





                Encounter       Location        Date            Diagnosis

 

                          [facility]     [address] [address]

                          06 Sep, 2018              Cutaneous abscess of buttock

 L02.31

 

                          [facility]  [address] [id number]

[address] [place] [address] 

[id number]                               

 

                          [facility]     [address] [id number]

[address] [place] [address]

                                         

 

                          [facility]     [address] [id number]

[address] [place] [address]

                                        Cystitis N30.90 and Dysuria 

R30.0

 

                          [facility]   [address] [id number]

[address] [place] [id number]

                          15 [place], 2018              Dental examination Z01.20

 

                          [facility]  [address] [id number]

[address] [place] [address] 

[id number]                15 [place], 2018              Fever, unspecified fever cau

se R50.9 and Acute 

nasopharyngitis J00

 

                          [facility]     [address] [id number]

[address] [place] [address]

                          31 Aug, 2017              Mood disorder F39

 

                          [facility]     [address] [id number]

[address] [place] [address]

                          21 Aug, 2017              Mood disorder F39

 

                          [facility]     [address] [id number]

[address] [place] [address]

                          22 Mar, 2017              Depressive disorder, not els

ewhere classified F32.9 and Social 

phobia F40.10

 

                          [facility]   [address] [id number]

[address] [place] [id number]

                          22 Mar, 2016              Encounter for dental examina

tion Z01.20

 

                          [facility]     [address] [facility] [id number]

[address] [place] [address]

                          29 Sep, 2015              Depressive disorder, not els

ewhere classified 311 ; Anxiety state, 

unspecified 300.00 and Attention deficit disorder without mention of 
hyperactivity 314.00

 

                          [facility]     [address] [facility] [id number]

[address] [place] [address]

                          01 Sep, 2015              Depressive disorder, not els

ewhere classified 311 ; Anxiety state, 

unspecified 300.00 and Attention deficit disorder without mention of 
hyperactivity 314.00

 

                          [facility]     [address] [facility] [id number]

[address] [place] [address]

                                        Depressive disorder, not els

ewhere classified 311

 

                          [facility]     [address] [facility] [id number]

[address] [place] [address]

                          13 May, 2015              Depressive disorder, not els

ewhere classified 311

 

                          [facility]     [address] [id number]

[address] [place] [phone]

                                         

 

                          [facility]     [address] [facility] [id number]

[address] [place] [address]

                                         

 

                          [facility]     [address] [facility] [id number]

[address] [place] [address]

                                         

 

                          [facility]     [address] [facility] [id number]

[address] [place] [address]

                          13 Mar, 2015               

 

                          [facility]     [address] [id number]

[address] [place] [address], [place] [address]

                          13 Mar, 2015               

 

                          [facility]     [address] [id number]

[address] [place] [address], [place] [address]

                                         

 

                          [facility]     [address] [id number]

[address] [place] [address], [place] [address]

                                         

 

                          [facility]     [address] [id number]

[address] [place] [address], [place] [address]

                          12 Dec, 2014               

 

                          [facility]     [address] [id number]

[address] [place] [address], [place] [address]

                          12 Dec, 2014               

 

                          [facility]     [address] [id number]

[address] [place] [address], [place] [address]

                                         

 

                          [facility]     [address] [id number]

[address] [place] [address], [place] [address]

                                         

 

                          [facility]     [address] [id number]

[address] [place] [address], [place] [address]

                          09 Oct, 2014               

 

                          [facility]     [address] [id number]

[address] [place] [address], [place] [address]

                          09 Oct, 2014               

 

                          [facility]     [address] [id number]

[address] [place] [address], [place] [address]

                          22 Aug, 2014               

 

                          [facility]     [address] [id number]

[address] [place] [address], [place] [address]

                          22 Aug, 2014               

 

                          [facility]     [address] [id number]

[address] [place] [address], [place] [address]

                          10 Jul, 2014               

 

                          [facility]     [address] [id number]

[address] [place] [address], [place] [address]

                          10 Jul, 2014               

 

                          [facility]     [address] [id number]

[address] [place] [address], [place] [address]

                          23 May, 2014               

 

                          [facility]     [address] [id number]

[address] [place] [address], [place] [address]

                          23 May, 2014               

 

                          [facility]     [address] [id number]

[address] [place] [address]

                                         

 

                          [facility]     [address] [facility] [id number]

[address] [place] [address]

                          05 Dec, 2012               

 

                          [facility]     [address] [facility] [id number]

[address] [place] [address]

                          05 Dec, 2012               

 

                          [facility]     [address] [facility] [id number]

[address] [place] [address]

                          17 Dec, 2008               







IMMUNIZATIONS

No Known Immunizations



SOCIAL HISTORY

Never Assessed



REASON FOR VISIT

boil on buttock. Pt states that she went to the ER on 18 and was told she ha
d a boil on her buttock, they drained the boil and told pt she needed to come in
to see us for a follow up to see how it is doing. MATTHIAS Moore



PLAN OF CARE





                          Activity                  Details

 

                                         

 

                          Follow Up                 prn Reason:







VITAL SIGNS





                    Height              64 in               2018

 

                    Weight              208.6 lbs           2018

 

                    Temperature         97.9 degrees Fahrenheit 2018

 

                    Heart Rate          82 bpm              2018

 

                    Respiratory Rate    18                  2018

 

                    BMI                 35.80 kg/m2         2018

 

                    Blood pressure systolic 124 mmHg            2018

 

                    Blood pressure diastolic 82 mmHg             2018







MEDICATIONS





        Medication Instructions Dosage  Frequency Start Date End Date Duration S

alexandr

 

           Bactrim -160 MG Orally 2 times a day 1 tablet   12h        03 A

ug, 2018 13 Sep, 

2018                                                Active







RESULTS

No Results



PROCEDURES

No Known procedures



INSTRUCTIONS





MEDICATIONS ADMINISTERED

No Known Medications



MEDICAL (GENERAL) HISTORY





                    Type                Description         Date

 

                    Medical History     Diabetes-II          

 

                    Surgical History    Tonsils removed     

 

                    Surgical History    Blood infection

## 2020-06-13 NOTE — XMS REPORT
[facility]

                             Created on: 10/14/2018



Melanie Carroll

External Reference #: [id number]

: 2000

Sex: Female



Demographics





                          Address                   [address] [place]  [address]

 

                          Preferred Language        Unknown

 

                          Marital Status            Unknown

 

                          Alevism Affiliation     Unknown

 

                          Race                      Unknown

 

                          Ethnic Group              Unknown





Author





                          Author                    Emilia BENAVIDEZlyn DEANNE

 

                          [facility]

 

                          Address                   [address] [place]  [address]



 

                          Phone                     [phone]







Care Team Providers





                    Care Team Member Name Role                Phone

 

                    DEANNE BENAVIDEZ       Unavailable         [phone]







PROBLEMS





          Type      Condition ICD9-CM Code AZW47-TC Code Onset Dates Condition S

tatus SNOMED 

Code

 

          Problem   Attention deficit disorder           F90.0               Act

wilmar    [id number]

 

             Problem      Allergic rhinitis, unspecified seasonality, unspecifie

d trigger              J30.9        

                          Active                    [id number]

 

          Problem   Mood disorder           F39                 Active    674737

05

 

          Problem   Dysthymic disorder           F34.1               Active    7

[id number]

 

          Problem   Anxiety disorder, unspecified           F41.9               

Active    [id number]

 

          Problem   Depressive disorder, not elsewhere classified           F32.

9               Active    [id number]

 

          Problem   Social phobia           F40.10              Active    907288

02







ALLERGIES





             Substance    Reaction     Event Type   Date         Status

 

             Rocephin     Unknown      Drug Allergy 04 Oct, 2018 Active







ENCOUNTERS





                Encounter       Location        Date            Diagnosis

 

                          [facility]     [address] [address]

                          04 Oct, 2018              Dysuria R30.0 ; Sore throat 

J02.9 ; Allergic rhinitis, unspecified 

seasonality, unspecified trigger J30.9 and Vaginal candida B37.3

 

                          [facility]     [address] [id number]

[address] [place] [address]

                          06 Sep, 2018              Cutaneous abscess of buttock

 L02.31

 

                          [facility]  [address] [id number]

[address] [place] [address] 

[phone]                               

 

                          [facility]     [address] [address]

                                         

 

                          [facility]     [address] [id number]

[address] [place] [address]

                                        Cystitis N30.90 and Dysuria 

R30.0

 

                          [facility]   [address] [id number]

[address] [place] [id number]

                          15 [place], 2018              Dental examination Z01.20

 

                          [facility]  [address] [facility] [id number]

[address] [place] [address] 

[id number]                15 [place], 2018              Fever, unspecified fever cau

se R50.9 and Acute 

nasopharyngitis J00

 

                          [facility]     [address] [facility] [id number]

[address] [place] [address]

                          31 Aug, 2017              Mood disorder F39

 

                          [facility]     [address] [facility] [id number]

[address] [place] [address]

                          21 Aug, 2017              Mood disorder F39

 

                          [facility]     [address] [facility] [id number] [place] [address]

                          22 Mar, 2017              Depressive disorder, not els

ewhere classified F32.9 and Social 

phobia F40.10

 

                          [facility]   [address] [id number]

[address] [place] [id number]

                          22 Mar, 2016              Encounter for dental examina

tion Z01.20

 

                          [facility]     [address] [facility] [id number]

[address] [place] [address]

                          29 Sep, 2015              Depressive disorder, not els

ewhere classified 311 ; Anxiety state, 

unspecified 300.00 and Attention deficit disorder without mention of 
hyperactivity 314.00

 

                          [facility]     [address] [facility] [id number]

[address] [place] [address]

                          01 Sep, 2015              Depressive disorder, not els

ewhere classified 311 ; Anxiety state, 

unspecified 300.00 and Attention deficit disorder without mention of 
hyperactivity 314.00

 

                          [facility]     [address] [facility] [id number]

[address] [place] [address]

                                        Depressive disorder, not els

ewhere classified 311

 

                          [facility]     [address] [facility] [id number]

[address] [place] [address]

                          13 May, 2015              Depressive disorder, not els

ewhere classified 311

 

                          [facility]     [address] [facility] [id number]

[address] [place] [address]

                                         

 

                          [facility]     [address] [facility] [id number]

[address] [place] [address]

                          14 2015               

 

                          [facility]     [address] [id number]

[address] [place] [address], [place] [address]

                                         

 

                          [facility]     [address] [id number]

[address] [place] [address], [place] [address]

                          13 Mar, 2015               

 

                          [facility]     [address] [id number]

[address] [place] [address], [place] [address]

                          13 Mar, 2015               

 

                          [facility]     [address] [id number]

[address] [place] [address], [place] [address]

                                         

 

                          [facility]     [address] [id number]

[address] [place] [address], [place] [address]

                                         

 

                          [facility]     [address] [id number]

[address] [place] [address], [place] [address]

                          12 Dec, 2014               

 

                          [facility]     [address] [id number]

[address] [place] [address], [place] [address]

                          12 Dec, 2014               

 

                          [facility]     [address] [id number]

[address] [place] [address], [place] [address]

                                         

 

                          [facility]     [address] [id number]

[address] [place] [address], [place] [address]

                                         

 

                          [facility]     [address] [id number]

[address] [place] [address], [place] [address]

                          09 Oct, 2014               

 

                          [facility]     [address] [id number]

[address] [place] [address], [place] [address]

                          09 Oct, 2014               

 

                          [facility]     [address] [id number]

[address] [place] [address], [place] [address]

                          22 Aug, 2014               

 

                          [facility]     [address] [id number]

[address] [place] [address], [place] [address]

                          22 Aug, 2014               

 

                          [facility]     [address] [id number]

[address] [place] [address], [place] [address]

                          10 Jul, 2014               

 

                          [facility]     [address] [id number]

[address] [place] [address], [place] [address]

                          10 Jul, 2014               

 

                          [facility]     [address] [facility] [id number]

[address] [place] [address]

                          23 May, 2014               

 

                          [facility]     [address] [facility] [id number]

[address] [place] [address]

                          23 May, 2014               

 

                          [facility]     [address] [facility] [id number]

[address] [place] [address]

                                         

 

                          [facility]     [address] [facility] [id number]

[address] [place] [address]

                          05 Dec, 2012               

 

                          [facility]     [address] [facility] [id number]

[address] [place] [phone]

                          05 Dec, 2012               

 

                          [facility]     [address] [facility] [id number]

[address] [place] [address]

                          17 Dec, 2008               







IMMUNIZATIONS

No Known Immunizations



SOCIAL HISTORY

Never Assessed



REASON FOR VISIT

STD check Vane RIZZO, cough, runny nose, drainage, left ear feels clogged. Vane RIZZO



PLAN OF CARE





                          Activity                  Details

 

                                         

 

                          Follow Up                 if not improving with PCP or

 reg follow up Reason:

 

                          Pending Test              GC/CHLAM URINE (STATE) 



                                        



VITAL SIGNS





                    Height              64 in               2018-10-04

 

                    Weight              205.5 lbs           2018-10-04

 

                    Temperature         98.8 degrees Fahrenheit 2018-10-04

 

                    Heart Rate          113 bpm             2018-10-04

 

                    Respiratory Rate    18                  2018-10-04

 

                    BMI                 35.27 kg/m2         2018-10-04

 

                    Blood pressure systolic 126 mmHg            2018-10-04

 

                    Blood pressure diastolic 82 mmHg             2018-10-04







MEDICATIONS





        Medication Instructions Dosage  Frequency Start Date End Date Duration S

tatus

 

          Zyrtec Allergy 10 mg Orally Once a day 1 tablet  24h       04 Oct, 201

8 2 2019 30 

day(s)                                  Active

 

        Diflucan 150 MG Orally Once a day 1 tablet 24h     04 Oct, 2018 7 Oct, 2

018 3 days  

Active

 

                Fluticasone Propionate 50 MCG/ACT Nasally Once a day 1 spray in 

each nostril 24h

                04 Oct, 2018                    30 day(s)       Active







RESULTS





                Name            Result          Date            Reference Range

 

                STREP A (IN HOUSE)                 2018-10-04       

 

                STREP A         NEG                              

 

                Control         +                                

 

                Lot #           [id number]                           

 

                Exp date        2019                       

 

                UA LONG DIP (IN HOUSE)                 2018-10-04       

 

                Lot #           [id number]                           

 

                Exp date        2019                           

 

                Clarity         clear                            

 

                Color           yellow                           

 

                Odor            none                             

 

                GLU             2+                               

 

                FLORINA             negative                         

 

                KET             negative                         

 

                SG              1.030                            

 

                BLO             negative                         

 

                pH              6.0                              

 

                Protein         negative                         

 

                URO             0.2                              

 

                NIT             negative                         

 

                JULIAN             negative                         

 

                Lot #                                            

 

                Exp date                                         







PROCEDURES





                Procedure       Date Ordered    Result          Body Site

 

                URINALYSIS, AUTO, W/O SCOPE Oct 04, 2018                     

 

                STREP A ASSAY W/OPTIC Oct 04, 2018                     

 

                No Charge       Oct 04, 2018                     







INSTRUCTIONS





MEDICATIONS ADMINISTERED

No Known Medications



MEDICAL (GENERAL) HISTORY





                    Type                Description         Date

 

                    Medical History     Diabetes-II          

 

                    Surgical History    Tonsils removed     

 

                    Surgical History    Blood infection     

 

                    Hospitalization History ER visit for boil   2018

## 2020-06-13 NOTE — XMS REPORT
[facility]

                             Created on: 2020



Melanie Carroll

External Reference #: [id number]

: 2000

Sex: Female



Demographics





                          Address                   [address] [place]  [address]

 

                          Preferred Language        Unknown

 

                          Marital Status            Unknown

 

                          Tenriism Affiliation     Unknown

 

                          Race                      Unknown

 

                          Ethnic Group              Unknown





Author





                          Author                    Melanie TEJADA

 

                          Organization              [facility]

 

                          Address                   [address] [place]  [address]



 

                          Phone                     [phone]







Care Team Providers





                    Care Team Member Name Role                Phone

 

                    LEIGH ANN TEJADA  Unavailable         [phone]







PROBLEMS





          Type      Condition ICD9-CM Code GVH78-OP Code Onset Dates Condition S

tatus SNOMED 

Code

 

          Problem   Attention deficit disorder           F90.0               Act

wilmar    [id number]

 

          Problem   Anxiety disorder, unspecified           F41.9               

Active    [id number]

 

          Problem   Dysthymic disorder           F34.1               Active    7

[id number]

 

                          Problem                   Type 2 diabetes mellitus wit

hout complication, without long-term current

use of insulin              E11.9                     Active       [id number]

 

                          Problem                   Severe episode of recurrent 

major depressive disorder, without psychotic

features                  F33.2                     Active       82043106

 

          Problem   Social phobia           F40.10              Active    795361

02

 

          Problem   Depressive disorder, not elsewhere classified           F32.

9               Active    [id number]

 

          Problem   Mood disorder           F39                 Active    148970

05

 

             Problem      Allergic rhinitis, unspecified seasonality, unspecifie

d trigger              J30.9        

                          Active                    [id number]







ALLERGIES

No Information



ENCOUNTERS





                Encounter       Location        Date            Diagnosis

 

                          [place] [address]

[address] [place] [phone]

                          02 Mar, 2020               

 

                          [facility]     [address] [place] [address]

[address] [place] [phone]

                                         

 

                          [facility]     [address]

[address] [place] [address]

                                         

 

                          [facility]   [address] [id number]

[address] [place] [id number]

                                        Dental examination Z01.20 an

d Caries K02.9

 

                          [facility]     [address]

[address] [place] [address]

                          19 Dec, 2019               

 

                          [facility]     [address] [place] [address]

[address] [place] [phone]

                          06 Dec, 2019              Cervical radiculopathy M54.1

2

 

                          [facility]     [address] [id number]

[address] [place] [phone]

                                        Type 2 diabetes mellitus wit

hout complication, without long-term 

current use of insulin E11.9

 

                          [facility]     [address] [id number]

[address] [place] [phone]

                                         

 

                          [facility]     [address] [id number]

[address] [place] [phone]

                                        Trochanteric bursitis of lef

t hip M70.62 ; Encounter for initial 

prescription of contraceptive pills Z30.011 and Type 2 diabetes mellitus without
complication, without long-term current use of insulin E11.9

 

                          [facility]     [address] [id number]

[address] [place] [phone]

                                         

 

                          [facility]     [address] [facility] [id number]

[address] [place] [address]

                          29 Oct, 2019              Left hip pain M25.552

 

                          [facility]     [address] [facility] [id number]

[address] [place] [phone]

                          22 Oct, 2019               

 

                          [facility]     [address] [facility] [id number]

[address] [place] [phone]

                          22 Oct, 2019               

 

                          [facility]     [address] [facility] [id number]

[address] [place] [phone]

                          10 Sep, 2019              Anxiety disorder, unspecifie

d F41.9

 

                          [facility]     [address] [facility] [id number]

[address] [place] [phone]

                          10 Sep, 2019              High risk heterosexual behav

ior Z72.51 and Left hip pain M25.552

 

                          [facility]   [address] [id number]

[address] [place] [id number]

                          07 Aug, 2019              Dental examination Z01.20

 

                          [facility]     [address] [facility] [id number]

[address] [place] [phone]

                                         

 

                          [facility]     [address] [facility] [id number] [place] [address]

                          17 2019              Severe episode of recurrent 

major depressive disorder, without 

psychotic features F33.2 and Type 2 diabetes mellitus without complication, 
without long-term current use of insulin E11.9

 

                          [facility]ALK IN [facility]  [address] 

[phone]                12 Mar, 2019               

 

                          [facility]  [address] 

[address]                12 Mar, 2019              Viral upper respiratory trac

t infection J06.9

 

                          [place] [address] [address]

                          15 [place], 2019              Vaginal discharge N89.8

 

                          [place] [address] [address]

                          25 Oct, 2018               

 

                          [place] [address] [address]

                          04 Oct, 2018              Dysuria R30.0 ; Sore throat 

J02.9 ; Allergic rhinitis, unspecified 

seasonality, unspecified trigger J30.9 and Vaginal candida B37.3

 

                          [place] [address] [address]

                          06 Sep, 2018              Cutaneous abscess of buttock

 L02.31

 

                          [facility] [place] [address] [place] [address]

[address] [place] [address] 

[phone]                23 2018               

 

                          [place] [address] [phone]

                                         

 

                          [place] [address] [address]

                                        Cystitis N30.90 and Dysuria 

R30.0

 

                          [facility]   [address]

[address] [place] [id number]

                          15 2018              Dental examination Z01.20

 

                          [facility]  [address] [place] [address]

[address] [place] [address] 

[phone]                15 [place], 2018              Fever, unspecified fever cau

se R50.9 and Acute 

nasopharyngitis J00

 

                          [facility]     [address] [id number]

[address] [place] [phone]

                          31 Aug, 2017              Mood disorder F39

 

                          [facility]     [address] [id number]

[address] [place] [phone]

                          21 Aug, 2017              Mood disorder F39

 

                          [facility]     [address] [facility] [id number]

[address] [place] [phone]

                          22 Mar, 2017              Depressive disorder, not els

ewhere classified F32.9 and Social 

phobia F40.10

 

                          [facility]   [address] [id number]

[address] [place] [phone]

                          22 Mar, 2016              Encounter for dental examina

tion Z01.20

 

                          [facility]     [address] [facility] [id number]

[address] [place] [phone]

                          29 Sep, 2015              Depressive disorder, not els

ewhere classified 311 ; Anxiety state, 

unspecified 300.00 and Attention deficit disorder without mention of 
hyperactivity 314.00

 

                          [facility]     [address] [facility] [id number]

[address] [place] [address]

                          01 Sep, 2015              Depressive disorder, not els

ewhere classified 311 ; Anxiety state, 

unspecified 300.00 and Attention deficit disorder without mention of 
hyperactivity 314.00

 

                          [facility]     [address] [facility] [id number]

[address] [place] [phone]

                                        Depressive disorder, not els

ewhere classified 311

 

                          [facility]     [address] [facility] [id number]

[address] [place] [phone]

                          13 May, 2015              Depressive disorder, not els

ewhere classified 311

 

                          [facility]     [address] [facility] [id number]

[address] [place] [phone]

                                         

 

                          [facility]     [address] [facility] [id number]

[address] [place] [phone]

                                         

 

                          [facility]     [address] [facility] [id number]

[address] [place] [phone]

                                         

 

                          [facility]     [address] [facility] [id number]

[address] [place] [phone]

                          13 Mar, 2015               

 

                          [facility]     [address] [id number]

[address] [place] [address], [place] [address]

                          13 Mar, 2015               

 

                          [facility]     [address] [id number]

[address] [place] [address], [place] [address]

                                         

 

                          [facility]     [address] [id number]

[address] [place] [address], [place] [address]

                                         

 

                          [facility]     [address] [id number]

[address] [place] [address], [place] [address]

                          12 Dec, 2014               

 

                          [facility]     [address] [id number]

[address] [place] [address], [place] [address]

                          12 Dec, 2014               

 

                          [facility]     [address] [id number]

[address] [place] [address], [place] [address]

                                         

 

                          [facility]     [address] [id number]

[address] [place] [address], [place] [address]

                                         

 

                          [facility]     [address] [id number]

[address] [place] [address], [place] [address]

                          09 Oct, 2014               

 

                          [facility]     [address] [id number]

[address] [place] [address], [place] [address]

                          09 Oct, 2014               

 

                          [facility]     [address] [id number]

[address] [place] [address], [place] [address]

                          22 Aug, 2014               

 

                          [facility]     [address] [id number]

[address] [place] [address], [place] [address]

                          22 Aug, 2014               

 

                          [facility]     [address] [id number]

[address] [place] [address], [place] [address]

                          10 Jul, 2014               

 

                          [facility]     [address] [id number]

[address] [place] [address], [place] [address]

                          10 Jul, 2014               

 

                          [facility]     [address] [id number]

[address] [place] [address], [place] [address]

                          23 May, 2014               

 

                          [facility]     [address] [id number]

[address] [place] [address], [place] [address]

                          23 May, 2014               

 

                          [facility]     [address] [id number]

[address] [place] [phone]

                                         

 

                          [facility]     [address] [facility] [id number]

[address] [place] [phone]

                          05 Dec, 2012               

 

                          [facility]     [address] [facility] [id number]

[address] [place] [phone]

                          05 Dec, 2012               

 

                          [facility]     [address] [facility] [id number]

[address] [place] [phone]

                          17 Dec, 2008               







IMMUNIZATIONS

No Known Immunizations



SOCIAL HISTORY

Never Assessed



REASON FOR VISIT





PLAN OF CARE





VITAL SIGNS





MEDICATIONS

Unknown Medications



RESULTS

No Results



PROCEDURES





                Procedure       Date Ordered    Result          Body Site

 

                PSYTX PT&/FAMILY 30 MINUTES Oct 09, 2014                     







INSTRUCTIONS





MEDICATIONS ADMINISTERED

No Known Medications



MEDICAL (GENERAL) HISTORY





                    Type                Description         Date

 

                    Medical History     Diabetes-II          

 

                    Medical History     Anxiety disorder, unspecified  

 

                    Medical History     Mood disorder        

 

                    Medical History     Social phobia        

 

                    Medical History     no hx of seizures or head injuries  

 

                    Surgical History    Tonsils removed     

 

                    Surgical History    Blood infection     

 

                    Hospitalization History ER visit for boil   2018

## 2020-06-13 NOTE — ED EENT
History of Present Illness


General


Chief Complaint:  Dental Problems/Pain


Stated Complaint:  DENTAL PAIN


Source:  patient


Exam Limitations:  no limitations





History of Present Illness


Date Seen by Provider:  Jun 13, 2020


Time Seen by Provider:  16:22


Initial Comments


To ER with reports of right upper dental pain sudden onset


Timing/Duration:  abrupt


Severity:  moderate


Location:  mouth


Associated Symptoms:  denies symptoms





Allergies and Home Medications


Allergies


Coded Allergies:  


     ceftriaxone (Unverified  Allergy, Mild, HIVES, 8/30/10)





Home Medications


Hydrocodone Bit/Acetaminophen 1 Tab Tab, 1 EACH PO Q6H PRN for PAIN


   Prescribed by: NAEL SANTOS on 9/2/18 2151


Hydrocodone Bit/Acetaminophen 1 Tab Tab, 1 EACH PO Q4-6HR PRN for PAIN-MODERATE


   Prescribed by: NAEL SANTOS on 3/17/19 2109


Nitrofurantoin Monohyd/M-Cryst 100 Mg Capsule, 1 TAB PO BID


   Prescribed by: NAEL SANTOS on 3/17/19 2109


Ondansetron HCl 4 Mg Tab, 4 MG PO Q4H PRN for NAUSEA/VOMITING


   Prescribed by: NAEL SANTOS on 3/17/19 2109


Sulfamethoxazole/Trimethoprim 1 Each Tablet, 1 EACH PO BID


   Prescribed by: NAEL SANTOS on 9/2/18 2151





Patient Home Medication List


Home Medication List Reviewed:  Yes





Review of Systems


Review of Systems


Constitutional:  see HPI


Eyes:  No Symptoms Reported


Ears:  No Symptoms Reported


Mouth:  no symptoms reported


Throat:  no symptoms reported


Respiratory:  no symptoms reported


Cardiovascular:  no symptoms reported


Musculoskeletal:  no symptoms reported


Skin:  no symptoms reported


Neurological:  No Symptoms Reported


Hematologic/Lymphatic:  No Symptoms Reported





Past Medical-Social-Family Hx


Patient Social History


Type Used:  Cigarettes


2nd Hand Smoke Exposure:  Yes


Recent Foreign Travel:  No


Contact w/Someone Who Travel:  No


Recent Hopitalizations:  No





Immunizations Up To Date


Tetanus Booster (TDap):  Less than 5yrs


PED Vaccines UTD:  No


Date of Pneumonia Vaccine:  Jan 7, 2013


Date of Influenza Vaccine:  Jan 7, 2013





Seasonal Allergies


Seasonal Allergies:  Yes





Past Medical History


Surgeries:  Yes (T&A, MULTIPLE ABSCESS I&D'S, "BLADDER STRETCHED" WHEN YOUNGER)


Adenoidectomy, Bladder Surgery, Tonsillectomy


Respiratory:  No


Cardiac:  No


Neurological:  No


Female Reproductive Disorders:  Denies


Genitourinary:  Yes


UTI-Chronic


Gastrointestinal:  Yes (Chronic constipation)


Chronic Constipation


Musculoskeletal:  No


Endocrine:  No


Diabetes, Insulin dep


HEENT:  No


Cancer:  No


Psychosocial:  No


Integumentary:  Yes


Blood Disorders:  No





Physical Exam


Height, Weight, BMI


Height: 5'5.00"


Weight: 200lbs. 0oz. 90.661183qa; 28.12 BMI


Method:Stated


General Appearance:  WD/WN, no apparent distress


Eyes:  bilateral eye normal inspection, bilateral eye PERRL, bilateral eye EOMI


Ears:  bilateral ear auricle normal, bilateral ear canal normal, bilateral ear 

TM normal


Neck:  non-tender, full range of motion


Respiratory:  no respiratory distress, no accessory muscle use


Gastrointestinal:  normal bowel sounds, non tender


Neurologic/Psychiatric:  alert, normal mood/affect, oriented x 3


Skin:  normal color, warm/dry





Progress/Results/Core Measures


Results/Orders


My Orders





Orders - ABHI ARCHULETA


Hydrocodone/Apap 5/325 Tablet (Lortab 5 (6/13/20 16:30)


Amoxicillin Capsule (Polymox Capsule) (6/13/20 16:20)








Departure


Communication (Admissions)


Did an anterior superior alveolar nerve block using 1.5 mL of 0.5% bupivacaine 

with epinephrine





Impression





   Primary Impression:  


   Pain, dental


Disposition:  01 HOME, SELF-CARE


Condition:  Stable





Departure-Patient Inst.


Decision time for Depature:  16:24


Referrals:  


[facility] ([facility]Family)


Primary Care Physician


Patient Instructions:  Dental Pain (DC)


Scripts


Amoxicillin (Amoxicillin) 500 Mg Capsule


500 MG PO TID, #21 CAP 0 Refills


   Prov: ABHI ARCHULETA         6/13/20 


Naproxen (Naprosyn) 500 Mg Tablet


500 MG PO BID PRN for PAIN-SEVERE (8-10), #30 TAB 0 Refills


   Prov: ABHI ARCHULETA         6/13/20





Images


Mouth/Nose











1 - Fracture Tooth, Tenderness














ABHI ARCHULETA             Jun 13, 2020 16:24

## 2020-06-13 NOTE — XMS REPORT
[facility]

                             Created on: 2019



Melanie Carroll

External Reference #: [id number]

: 2000

Sex: Female



Demographics





                          Address                   [address] [place]  [address]

 

                          Preferred Language        Unknown

 

                          Marital Status            Unknown

 

                          Tenriism Affiliation     Unknown

 

                          Race                      Unknown

 

                          Ethnic Group              Unknown





Author





                          Author                    Melanie Espino

 

                          Organization              [facility]

 

                          Address                   [address] [place]  [address]



 

                          Phone                     [phone]







Care Team Providers





                    Care Team Member Name Role                Phone

 

                    MAO Espino  Unavailable         [phone]







PROBLEMS





          Type      Condition ICD9-CM Code GQA59-AD Code Onset Dates Condition S

tatus SNOMED 

Code

 

          Problem   Attention deficit disorder           F90.0               Act

wilmar    [id number]

 

          Problem   Anxiety disorder, unspecified           F41.9               

Active    [id number]

 

          Problem   Dysthymic disorder           F34.1               Active    7

[id number]

 

                          Problem                   Type 2 diabetes mellitus wit

hout complication, without long-term current

use of insulin              E11.9                     Active       [id number]

 

                          Problem                   Severe episode of recurrent 

major depressive disorder, without psychotic

features                  F33.2                     Active       76111153

 

          Problem   Social phobia           F40.10              Active    285364

02

 

          Problem   Depressive disorder, not elsewhere classified           F32.

9               Active    05797765

 

          Problem   Mood disorder           F39                 Active    215689

05

 

             Problem      Allergic rhinitis, unspecified seasonality, unspecifie

d trigger              J30.9        

                          Active                    [id number]







ALLERGIES

No Information



ENCOUNTERS





                Encounter       Location        Date            Diagnosis

 

                          [facility]   [address] [id number]

[address] [place] [phone]

                          07 Aug, 2019               

 

                          [facility]     [address] [id number]

[address] [place] [address]

                                         

 

                          [facility]     [address] [id number]

[address] [place] [phone]

                                        Severe episode of recurrent 

major depressive disorder, without 

psychotic features F33.2 and Type 2 diabetes mellitus without complication, 
without long-term current use of insulin E11.9

 

                          [facility]  [address] [facility] [id number]

[address] [place] [address] 

[phone]                12 Mar, 2019               

 

                          [facility]  [address] [id number]

[address] [place] [address] 

[phone]                12 Mar, 2019              Viral upper respiratory trac

t infection J06.9

 

                          [facility]     [address] [facility] [id number]

[address] [place] [address]

                          [address] [place], 2019              Vaginal discharge N89.8

 

                          [facility]     [address] [facility] [id number]

[address] [place] [address]

                          25 Oct, 2018               

 

                          [facility]     [address] [facility] [id number]

[address] [place] [address]

                          04 Oct, 2018              Dysuria R30.0 ; Sore throat 

J02.9 ; Allergic rhinitis, unspecified 

seasonality, unspecified trigger J30.9 and Vaginal candida B37.3

 

                          [address] [place] [address] [facility] [id number]

[address] [place] [address]

                          06 Sep, 2018              Cutaneous abscess of buttock

 L02.31

 

                          [facility]  [address] [facility] [id number]

[address] [place] [address] 

[phone]                               

 

                          [facility]     [address] [facility] [id number]

[address] [place] [address]

                                         

 

                          [facility]     [address] [facility] [id number]

[address] [place] [address]

                                        Cystitis N30.90 and Dysuria 

R30.0

 

                          [facility]   [address] [id number]

[address] [place] [id number]

                          [address] [place], 2018              Dental examination Z01.20

 

                          [facility] [place] [facility]  [address] [facility] [id number]

[address] [place] [address] 

[phone]                [address] [place], 2018              Fever, unspecified fever cau

se R50.9 and Acute 

nasopharyngitis J00

 

                          [facility]     [address] [facility] [id number]

[address] [place] [address]

                          31 Aug, 2017              Mood disorder F39

 

                          [facility]     [address] [facility] [id number]

[address] [place] [address]

                          21 Aug, 2017              Mood disorder F39

 

                          [facility]     [address] [facility] [id number]

[address] [place] [address]

                          22 Mar, 2017              Depressive disorder, not els

ewhere classified F32.9 and Social 

phobia F40.10

 

                          [facility]   [address] [id number]

[address] [place] [phone]

                          22 Mar, 2016              Encounter for dental examina

tion Z01.20

 

                          [facility]     [address] [id number]

[address] [place] [address]

                          29 Sep, 2015              Depressive disorder, not els

ewhere classified 311 ; Anxiety state, 

unspecified 300.00 and Attention deficit disorder without mention of 
hyperactivity 314.00

 

                          [facility]     [address] [id number]

[address] [place] [address]

                          01 Sep, 2015              Depressive disorder, not els

ewhere classified 311 ; Anxiety state, 

unspecified 300.00 and Attention deficit disorder without mention of 
hyperactivity 314.00

 

                          [facility]     [address] [id number]

[address] [place] [phone]

                                        Depressive disorder, not els

ewhere classified 311

 

                          [facility]     [address] [id number]

[address] [place] [phone]

                          13 May, 2015              Depressive disorder, not els

ewhere classified 311

 

                          [facility]     [address] [id number]

[address] [place] [phone]

                                         

 

                          [facility]     [address] [id number]

[address] [place] [address]

                                         

 

                          [facility]     [address] [id number]

[address] [place] [address]

                                         

 

                          [facility]     [address] [id number]

[address] [place] [phone]

                          13 Mar, 2015               

 

                          [facility]     [address] [id number]

[address] [place] [phone]

                          13 Mar, 2015               

 

                          [facility]     [address] [id number]

[address] [place] [phone]

                                         

 

                          [facility]     [address] [id number]

[address] [place] [phone]

                                         

 

                          [facility]     [address] [id number]

[address] [place] [address], [place] [address]

                          12 Dec, 2014               

 

                          [facility]     [address] [id number]

[address] [place] [address], [place] [address]

                          12 Dec, 2014               

 

                          [facility]     [address] [id number]

[address] [place] [address], [place] [address]

                                         

 

                          [facility]     [address] [id number]

[address] [place] [address], [place] [address]

                                         

 

                          [facility]     [address] [id number]

[address] [place] [address], [place] [address]

                          09 Oct, 2014               

 

                          [facility]     [address] [id number]

[address] [place] [address], [place] [address]

                          09 Oct, 2014               

 

                          [facility]     [address] [id number]

[address] [place] [address], [place] [address]

                          22 Aug, 2014               

 

                          [facility]     [address] [id number]

[address] [place] [address], [place] [address]

                          22 Aug, 2014               

 

                          [facility]     [address] [id number]

[address] [place] [address], [place] [address]

                          10 Jul, 2014               

 

                          [facility]     [address] [id number]

[address] [place] [address], [place] [address]

                          10 Jul, 2014               

 

                          [facility]     [address] [id number]

[address] [place] [address], [place] [address]

                          23 May, 2014               

 

                          [facility]     [address] [id number]

[address] [place] [address], [place] [address]

                          23 May, 2014               

 

                          [facility]     [address] [id number]

[address] [place] [address], [place] [address]

                                         

 

                          [facility]     [address] [id number]

[address] [place] [address], [place] [address]

                          05 Dec, 2012               

 

                          [facility]     [address] [id number]

[address] [place] [address], [place] [address]

                          05 Dec, 2012               

 

                          [facility]     [address] [id number]

[address] [place] [address], [place] [address]

                          17 Dec, 2008               







IMMUNIZATIONS

No Known Immunizations



SOCIAL HISTORY

Never Assessed



REASON FOR VISIT





PLAN OF CARE





VITAL SIGNS





                    Height              64 in               2015

 

                    Weight              216 lbs             2015

 

                    Temperature         97.3 degrees Fahrenheit 2015

 

                    Heart Rate          78 bpm              2015

 

                    Respiratory Rate    20                  2015

 

                    Blood pressure systolic 102 mmHg            2015

 

                    Blood pressure diastolic 70 mmHg             2015







MEDICATIONS

Unknown Medications



RESULTS

No Results



PROCEDURES

No Known procedures



INSTRUCTIONS





MEDICATIONS ADMINISTERED

No Known Medications



MEDICAL (GENERAL) HISTORY





                    Type                Description         Date

 

                    Medical History     Diabetes-II          

 

                    Surgical History    Tonsils removed     

 

                    Surgical History    Blood infection     

 

                    Hospitalization History ER visit for boil   2018

## 2020-06-13 NOTE — XMS REPORT
[facility]

                             Created on: 2020



Melanie Carroll

External Reference #: [id number]

: 2000

Sex: Female



Demographics





                          Address                   [address] [place]  [address]

 

                          Preferred Language        Unknown

 

                          Marital Status            Unknown

 

                          Yazidism Affiliation     Unknown

 

                          Race                      Unknown

 

                          Ethnic Group              Unknown





Author





                          Author                    Melanie DE LEON

 

                          Organization              [facility]

 

                          Address                   [address] [place]  [address]



 

                          Phone                     [phone]







Care Team Providers





                    Care Team Member Name Role                Phone

 

                    ABILIO DE LEON   Unavailable         [phone]







PROBLEMS





          Type      Condition ICD9-CM Code TVO10-AF Code Onset Dates Condition S

tatus SNOMED 

Code

 

          Problem   Attention deficit disorder           F90.0               Act

wilmar    [id number]

 

          Problem   Anxiety disorder, unspecified           F41.9               

Active    [id number]

 

          Problem   Dysthymic disorder           F34.1               Active    7

[id number]

 

                          Problem                   Type 2 diabetes mellitus wit

hout complication, without long-term current

use of insulin              E11.9                     Active       [id number]

 

                          Problem                   Severe episode of recurrent 

major depressive disorder, without psychotic

features                  F33.2                     Active       74523723

 

          Problem   Social phobia           F40.10              Active    133909

02

 

          Problem   Depressive disorder, not elsewhere classified           F32.

9               Active    14188881

 

          Problem   Mood disorder           F39                 Active    718465

05

 

             Problem      Allergic rhinitis, unspecified seasonality, unspecifie

d trigger              J30.9        

                          Active                    [id number]







ALLERGIES

No Information



ENCOUNTERS





                Encounter       Location        Date            Diagnosis

 

                    [facility] [address] [id number] [place], [place] [id number]                                Dental examination Z01.20 and Caries K02

.9

 

                    [place] [id number] [place] [address] 19 

Dec, 2019                                

 

                    [facility] [address] [address] 06 

Dec, 2019                               Cervical radiculopathy M54.12

 

                    [facility] [id number] [place] [address]                                Type 2 diabetes mellitus without complic

ation, without long-term 

current use of insulin E11.9

 

                    [facility] [id number] [place] [address]                                 

 

                    [place] [address] [address]                                Trochanteric bursitis of left hip M70.62

 ; Encounter for initial 

prescription of contraceptive pills Z30.011 and Type 2 diabetes mellitus without
complication, without long-term current use of insulin E11.9

 

                    [place] [address] [address]                                 

 

                    [place] [address] [address] 29 

Oct, 2019                               Left hip pain M25.552

 

                    [place] [address] [address] 22 

Oct, 2019                                

 

                    [place] [address] [address] 22 

Oct, 2019                                

 

                    [place] [address] [address] 10 

Sep, 2019                               Anxiety disorder, unspecified F41.9

 

                    [place] [address] [address] 10 

Sep, 2019                               High risk heterosexual behavior Z72.51 a

nd Left hip pain M25.552

 

                    [facility] [address] [id number] [place] [id number] 07 

Aug, 2019                               Dental examination Z01.20

 

                    [place] [id number] [place] [address]                                 

 

                    [place] [address] [address]                                Severe episode of recurrent major depres

sive disorder, without 

psychotic features F33.2 and Type 2 diabetes mellitus without complication, 
without long-term current use of insulin E11.9

 

                          [facility]  [facility] [address] [facility] [id number]

[address] [place] [address] 

[address]                12 Mar, 2019               

 

                          [facility]  [address] [facility] [id number]

[address] [place] [address] 

[address]                12 Mar, 2019              Viral upper respiratory trac

t infection J06.9

 

                    [place] [address] [address] 

[place], 2019                               Vaginal discharge N89.8

 

                    [place] [address] [address] 25 

Oct, 2018                                

 

                    [place] [address] [place] [phone] 

Oct, 2018                               Dysuria R30.0 ; Sore throat J02.9 ; Foster

rgic rhinitis, unspecified 

seasonality, unspecified trigger J30.9 and Vaginal candida B37.3

 

                    [place] [address] [address] 06 

Sep, 2018                               Cutaneous abscess of buttock L02.31

 

                          [facility] [place] [address] 

[address]                               

 

                    [place] [address] [address]                                 

 

                    [place] [address] [address]                                Cystitis N30.90 and Dysuria R30.0

 

                    [facility] [address] [id number] 

[place], 2018                               Dental examination Z01.20

 

                          [facility] [place] [address] 

[address]                15 [place], 2018              Fever, unspecified fever cau

se R50.9 and Acute 

nasopharyngitis J00

 

                    [place] [address] [address] 31 

Aug, 2017                               Mood disorder F39

 

                    [place] [address] [address] 21 

Aug, 2017                               Mood disorder F39

 

                    [place] [address] [address] 22 

Mar, 2017                               Depressive disorder, not elsewhere class

ified F32.9 and Social phobia 

F40.10

 

                    [facility] [address] [id number] 22 

Mar, 2016                               Encounter for dental examination Z01.20

 

                    [facility] [address] [facility] [place] [address] 29 

Sep, 2015                               Depressive disorder, not elsewhere class

ified 311 ; Anxiety state, 

unspecified 300.00 and Attention deficit disorder without mention of 
hyperactivity 314.00

 

                    [facility] [id number] [place] [address] 01 

Sep, 2015                               Depressive disorder, not elsewhere class

ified 311 ; Anxiety state, 

unspecified 300.00 and Attention deficit disorder without mention of 
hyperactivity 314.00

 

                    [facility] [id number] [place] [address]                                Depressive disorder, not elsewhere class

ified 311

 

                    [facility] [id number] [place] [address] 13 

May, 2015                               Depressive disorder, not elsewhere class

ified 311

 

                    [facility] [id number] [place] [address]                                 

 

                    [facility] [id number] [place] [address]                                 

 

                    [facility] [id number] [place] [address]                                 

 

                    [facility] [address] [address] 13 

Mar, 2015                                

 

                    [facility] [id number] [place] [address] 13 

Mar, 2015                                

 

                    [facility] [id number] [place] [address]                                 

 

                    [facility] [id number] [place] [address]                                 

 

                    [facility] [address] [address] 12 

Dec, 2014                                

 

                    [facility] [id number] [place] [address] 12 

Dec, 2014                                

 

                    [facility] [address] [address]                                 

 

                    [facility] [address] [facility] [place] [address]                                 

 

                    [facility] [address] [facility] [place] [address] 09 

Oct, 2014                                

 

                    [facility] [address] [facility] [place] [address] 09 

Oct, 2014                                

 

                    [facility] [id number] [place] [address] 22 

Aug, 2014                                

 

                    [facility] [address] [facility] [place] [address] 22 

Aug, 2014                                

 

                    [facility] [id number] [place] [address] 10 

Jul, 2014                                

 

                    [facility] [address] [facility] [place] [address] 10 

Jul, 2014                                

 

                    [facility] [id number] [place] [address] 23 

May, 2014                                

 

                    [facility] [address] [facility] [place] [address] 23 

May, 2014                                

 

                    [facility] [id number] [place] [address]                                 

 

                    [facility] [address] [facility] [place] [address] 05 

Dec, 2012                                

 

                    [facility] [address] [facility] [place] [address] 05 

Dec, 2012                                

 

                    [facility] [address] [facility] [place] [address] 17 

Dec, 2008                                







IMMUNIZATIONS

No Known Immunizations



SOCIAL HISTORY

Never Assessed



REASON FOR VISIT





PLAN OF CARE





VITAL SIGNS





MEDICATIONS

No Known Medications



RESULTS

No Results



PROCEDURES

No Known procedures



INSTRUCTIONS





MEDICATIONS ADMINISTERED

No Known Medications



MEDICAL (GENERAL) HISTORY





                    Type                Description         Date

 

                    Medical History     Diabetes-II          

 

                    Medical History     Anxiety disorder, unspecified  

 

                    Medical History     Mood disorder        

 

                    Medical History     Social phobia        

 

                    Medical History     no hx of seizures or head injuries  

 

                    Surgical History    Tonsils removed     

 

                    Surgical History    Blood infection     

 

                    Hospitalization History ER visit for boil   2018

## 2020-06-13 NOTE — XMS REPORT
[facility]

                             Created on: 2020



Melanie Carroll

External Reference #: [id number]

: 2000

Sex: Female



Demographics





                          Address                   [address] [place]  [address]

 

                          Preferred Language        Unknown

 

                          Marital Status            Unknown

 

                          Episcopalian Affiliation     Unknown

 

                          Race                      Unknown

 

                          Ethnic Group              Unknown





Author





                          Author                    Melanie DE LEON

 

                          Organization              [facility]

 

                          Address                   [address] [place]  [address]



 

                          Phone                     [phone]







Care Team Providers





                    Care Team Member Name Role                Phone

 

                    MOISESMARQUITAELA   Unavailable         [phone]







PROBLEMS





          Type      Condition ICD9-CM Code DFQ83-FQ Code Onset Dates Condition S

tatus SNOMED 

Code

 

          Problem   Attention deficit disorder           F90.0               Act

wilmar    [id number]

 

          Problem   Anxiety disorder, unspecified           F41.9               

Active    [id number]

 

          Problem   Dysthymic disorder           F34.1               Active    7

[id number]

 

                          Problem                   Type 2 diabetes mellitus wit

hout complication, without long-term current

use of insulin              E11.9                     Active       [id number]

 

                          Problem                   Severe episode of recurrent 

major depressive disorder, without psychotic

features                  F33.2                     Active       [id number]

 

          Problem   Social phobia           F40.10              Active    161856

02

 

          Problem   Depressive disorder, not elsewhere classified           F32.

9               Active    [id number]

 

          Problem   Mood disorder           F39                 Active    347854

05

 

             Problem      Allergic rhinitis, unspecified seasonality, unspecifie

d trigger              J30.9        

                          Active                    [id number]







ALLERGIES

No Information



ENCOUNTERS





                Encounter       Location        Date            Diagnosis

 

                          [place] [id number]

[address] [place] [address]

                          02 Mar, 2020               

 

                          [facility]     [address] [id number]

[address] [place] [address]

                                         

 

                          [facility]     [address] [id number]

[address] [place] [address]

                                         

 

                          [facility] DENT[facility]   [address] [id number]

[address] [place] [id number]

                                        Dental examination Z01.20 an

d Caries K02.9

 

                          [facility]     [address] [id number]

[address] [place] [address]

                          19 Dec, 2019               

 

                          [facility]     [address] [id number]

[address] [place] [address]

                          06 Dec, 2019              Cervical radiculopathy M54.1

2

 

                          [facility]     [address] [id number]

[address] [place] [address]

                                        Type 2 diabetes mellitus wit

hout complication, without long-term 

current use of insulin E11.9

 

                          [facility]     [address] [id number]

[address] [place] [address]

                                         

 

                          [facility]     [address] [facility] [id number]

[address] [place] [address]

                                        Trochanteric bursitis of lef

t hip M70.62 ; Encounter for initial 

prescription of contraceptive pills Z30.011 and Type 2 diabetes mellitus without
complication, without long-term current use of insulin E11.9

 

                          [facility]     [address] [id number]

[address] [place] [address]

                                         

 

                          [facility]     [address] [facility] [id number]

[address] [place] [address]

                          29 Oct, 2019              Left hip pain M25.552

 

                          [facility]     [address] [id number]

[address] [place] [address]

                          22 Oct, 2019               

 

                          [facility]     [address] [facility] [id number]

[address] [place] [address]

                          22 Oct, 2019               

 

                          [facility]     [address] [facility] [id number]

[address] [place] [address]

                          10 Sep, 2019              Anxiety disorder, unspecifie

d F41.9

 

                          [facility]     [address] [facility] [id number]

[address] [place] [address]

                          10 Sep, 2019              High risk heterosexual behav

ior Z72.51 and Left hip pain M25.552

 

                          [facility] DENT[facility]   [address] [id number]

[address] [place] [id number]

                          07 Aug, 2019              Dental examination Z01.20

 

                          [facility]     [address] [id number]

[address] [place] [address]

                                         

 

                          [facility]     [address] [facility] [id number]

[address] [place] [address]

                                        Severe episode of recurrent 

major depressive disorder, without 

psychotic features F33.2 and Type 2 diabetes mellitus without complication, 
without long-term current use of insulin E11.9

 

                          [facility] WALK IN [facility]  [address] 

[address]                12 Mar, 2019               

 

                          [facility]  [address] 

[address]                12 Mar, 2019              Viral upper respiratory trac

t infection J06.9

 

                          [place] [address] [address]

                          [address] [place], 2019              Vaginal discharge N89.8

 

                          [place] [address] [address]

                          25 Oct, 2018               

 

                          [place] [address] [address]

                          04 Oct, 2018              Dysuria R30.0 ; Sore throat 

J02.9 ; Allergic rhinitis, unspecified 

seasonality, unspecified trigger J30.9 and Vaginal candida B37.3

 

                          [place] [address] [address]

                          06 Sep, 2018              Cutaneous abscess of buttock

 L02.31

 

                          [facility] [place] [id number]

[address] [place] [address] 

[address]                23 2018               

 

                          [place] [address] [address]

                                         

 

                          [place] [address] [address]

                                        Cystitis N30.90 and Dysuria 

R30.0

 

                          [facility] DENT[facility]   [address] [place] [id number] [place] [id number]

                          15 2018              Dental examination Z01.20

 

                          [facility]  [address] [id number]

[address] [place] [address] 

[address]                15 [place], 2018              Fever, unspecified fever cau

se R50.9 and Acute 

nasopharyngitis J00

 

                          [place] [address] [id number]

[address] [place] [address]

                          31 Aug, 2017              Mood disorder F39

 

                          [facility]     [address] [facility] [id number]

[address] [place] [address]

                          21 Aug, 2017              Mood disorder F39

 

                          [facility]     [address] [facility] [id number]

[address] [place] [address]

                          22 Mar, 2017              Depressive disorder, not els

ewhere classified F32.9 and Social 

phobia F40.10

 

                          [facility]   [address] [id number]

[address] [place] [id number]

                          22 Mar, 2016              Encounter for dental examina

tion Z01.20

 

                          [facility]     [address] [facility] [id number]

[address] [place] [address]

                          29 Sep, 2015              Depressive disorder, not els

ewhere classified 311 ; Anxiety state, 

unspecified 300.00 and Attention deficit disorder without mention of 
hyperactivity 314.00

 

                          [facility]     [address] [facility] [id number]

[address] [place] [address]

                          01 Sep, 2015              Depressive disorder, not els

ewhere classified 311 ; Anxiety state, 

unspecified 300.00 and Attention deficit disorder without mention of 
hyperactivity 314.00

 

                          [facility]     [address] [facility] [id number]

[address] [place] [address]

                                        Depressive disorder, not els

ewhere classified 311

 

                          [facility]     [address] [facility] [id number]

[address] [place] [address]

                          13 May, 2015              Depressive disorder, not els

ewhere classified 311

 

                          [facility]     [address] [facility] [id number]

[address] [place] [address]

                                         

 

                          [facility]     [address] [facility] [id number]

[address] [place] [address]

                                         

 

                          [facility]     [address] [facility] [id number]

[address] [place] [address]

                                         

 

                          [facility]     [address] [facility] [id number]

[address] [place] [address]

                          13 Mar, 2015               

 

                          [facility]     [address] [id number]

[address] [place] [address], [place] [address]

                          13 Mar, 2015               

 

                          [facility]     [address] [id number]

[address] [place] [address], [place] [address]

                                         

 

                          [facility]     [address] [id number]

[address] [place] [address], [place] [address]

                                         

 

                          [facility]     [address] [id number]

[address] [place] [address], [place] [address]

                          12 Dec, 2014               

 

                          [facility]     [address] [id number]

[address] [place] [address], [place] [address]

                          12 Dec, 2014               

 

                          [facility]     [address] [id number]

[address] [place] [address], [place] [address]

                                         

 

                          [facility]     [address] [id number]

[address] [place] [address], [place] [address]

                                         

 

                          [facility]     [address] [id number]

[address] [place] [address], [place] [address]

                          09 Oct, 2014               

 

                          [facility]     [address] [id number]

[address] [place] [address], [place] [address]

                          09 Oct, 2014               

 

                          [facility]     [address] [id number]

[address] [place] [address], [place] [address]

                          22 Aug, 2014               

 

                          [facility]     [address] [id number]

[address] [place] [address], [place] [address]

                          22 Aug, 2014               

 

                          [facility]     [address] [id number]

[address] [place] [address], [place] [address]

                          10 Jul, 2014               

 

                          [facility]     [address] [id number]

[address] [place] [address], [place] [address]

                          10 Jul, 2014               

 

                          [facility]     [address] [id number]

[address] [place] [address], [place] [address]

                          23 May, 2014               

 

                          [facility]     [address] [id number]

[address] [place] [address], [place] [address]

                          23 May, 2014               

 

                          [facility]     [address] [id number]

[address] [place] [address], [place] [address]

                                         

 

                          [facility]     [address] [facility] [id number]

[address] [place] [address]

                          05 Dec, 2012               

 

                          [facility]     [address] [facility] [id number]

[address] [place] [address]

                          05 Dec, 2012               

 

                          [facility]     [address] [facility] [id number]

[address] [place] [address]

                          17 Dec, 2008               







IMMUNIZATIONS

No Known Immunizations



SOCIAL HISTORY

Never Assessed



REASON FOR VISIT





PLAN OF CARE





VITAL SIGNS





MEDICATIONS

Unknown Medications



RESULTS

No Results



PROCEDURES





                Procedure       Date Ordered    Result          Body Site

 

                PSYTX PT&/FAMILY 45 MINUTES Dec 12, 2014                     







INSTRUCTIONS





MEDICATIONS ADMINISTERED

No Known Medications



MEDICAL (GENERAL) HISTORY





                    Type                Description         Date

 

                    Medical History     Diabetes-II          

 

                    Medical History     Anxiety disorder, unspecified  

 

                    Medical History     Mood disorder        

 

                    Medical History     Social phobia        

 

                    Medical History     no hx of seizures or head injuries  

 

                    Surgical History    Tonsils removed     

 

                    Surgical History    Blood infection     

 

                    Hospitalization History ER visit for boil   2018

## 2020-06-13 NOTE — XMS REPORT
[facility]

                             Created on: 2019



Melanie Carroll

External Reference #: [id number]

: 2000

Sex: Female



Demographics





                          Address                   [address] [place]  [address]

 

                          Preferred Language        Unknown

 

                          Marital Status            Unknown

 

                          Jain Affiliation     Unknown

 

                          Race                      Unknown

 

                          Ethnic Group              Unknown





Author





                          Author                    Manuel, Melanie Doctor

 

                          Organization              [facility]

 

                          Address                   Unknown

 

                          Phone                     Unavailable







Care Team Providers





                    Care Team Member Name Role                Phone

 

                    Migration,  Doctor  Unavailable         Unavailable







PROBLEMS





          Type      Condition ICD9-CM Code BDF26-ZJ Code Onset Dates Condition S

tatus SNOMED 

Code

 

          Problem   Attention deficit disorder           F90.0               Act

wilmar    [id number]

 

          Problem   Mood disorder           F39                 Active    158741

05

 

             Problem      Allergic rhinitis, unspecified seasonality, unspecifie

d trigger              J30.9        

                          Active                    [id number]

 

          Problem   Anxiety disorder, unspecified           F41.9               

Active    [id number]

 

          Problem   Dysthymic disorder           F34.1               Active    7

[id number]

 

          Problem   Social phobia           F40.10              Active    662449

02

 

          Problem   Depressive disorder, not elsewhere classified           F32.

9               Active    [id number]







ALLERGIES

No Information



ENCOUNTERS





                Encounter       Location        Date            Diagnosis

 

                          [facility]  [id number]

[address] [place] [address] 

[id number]                12 Mar, 2019               

 

                          [facility]  [address] [id number]

[address] [place] [address] 

[address]                12 Mar, 2019              Viral upper respiratory trac

t infection J06.9

 

                          [facility]     [address] [id number]

[address] [place] [address]

                          15 [place], 2019              Vaginal discharge N89.8

 

                          [place] [id number]

[address] [place] [address]

                          25 Oct, 2018               

 

                          [place] [address] [id number]

[address] [place] [address]

                          04 Oct, 2018              Dysuria R30.0 ; Sore throat 

J02.9 ; Allergic rhinitis, unspecified 

seasonality, unspecified trigger J30.9 and Vaginal candida B37.3

 

                          [facility]     [address] [id number]

[address] [place] [address]

                          06 Sep, 2018              Cutaneous abscess of buttock

 L02.31

 

                          [facility]  [address] [id number]

[address] [place] [address] 

[phone]                               

 

                          [facility]     [address] [id number]

[address] [place] [address]

                                         

 

                          [facility]     [address] [id number]

[address] [place] [address]

                                        Cystitis N30.90 and Dysuria 

R30.0

 

                          [facility]   [address] [id number]

[address] [place] [id number]

                          15 [place], 2018              Dental examination Z01.20

 

                          [facility]  [address] [id number]

[address] [place] [address] 

[id number]                15 [place], 2018              Fever, unspecified fever cau

se R50.9 and Acute 

nasopharyngitis J00

 

                          [facility]     [address] [id number]

[address] [place] [address]

                          31 Aug, 2017              Mood disorder F39

 

                          [facility]     [address] [id number]

[address] [place] [address]

                          21 Aug, 2017              Mood disorder F39

 

                          [facility]     [address] [facility] [id number]

[address] [place] [address]

                          22 Mar, 2017              Depressive disorder, not els

ewhere classified F32.9 and Social 

phobia F40.10

 

                          [facility]   [address] [id number]

[address] [place] [id number]

                          22 Mar, 2016              Encounter for dental examina

tion Z01.20

 

                          [facility]     [address] [id number]

[address] [place] [address]

                          29 Sep, 2015              Depressive disorder, not els

ewhere classified 311 ; Anxiety state, 

unspecified 300.00 and Attention deficit disorder without mention of 
hyperactivity 314.00

 

                          [facility]     [address] [facility] [id number]

[address] [place] [address]

                          01 Sep, 2015              Depressive disorder, not els

ewhere classified 311 ; Anxiety state, 

unspecified 300.00 and Attention deficit disorder without mention of 
hyperactivity 314.00

 

                          [facility]     [address] [facility] [id number]

[address] [place] [address]

                                        Depressive disorder, not els

ewhere classified 311

 

                          [facility]     [address] [id number]

[address] [place] [address], [place] [address]

                          13 May, 2015              Depressive disorder, not els

ewhere classified 311

 

                          [facility]     [address] [id number]

[address] [place] [address], [place] [address]

                                         

 

                          [facility]     [address] [id number]

[address] [place] [address], [place] [address]

                                         

 

                          [facility]     [address] [id number]

[address] [place] [address], [place] [address]

                                         

 

                          [facility]     [address] [id number]

[address] [place] [address], [place] [address]

                          13 Mar, 2015               

 

                          [facility]     [address] [id number]

[address] [place] [address], [place] [address]

                          13 Mar, 2015               

 

                          [facility]     [address] [id number]

[address] [place] [address], [place] [address]

                                         

 

                          [facility]     [address] [id number]

[address] [place] [address], [place] [address]

                                         

 

                          [facility]     [address] [id number]

[address] [place] [address], [place] [address]

                          12 Dec, 2014               

 

                          [facility]     [address] [id number]

[address] [place] [address]

                          12 Dec, 2014               

 

                          [facility]     [address] [id number]

[address] [place] [address], [place] [address]

                                         

 

                          [facility]     [address] [id number]

[address] [place] [phone]

                                         

 

                          [facility]     [address] [id number]

[address] [place] [address], [place] [address]

                          09 Oct, 2014               

 

                          [facility]     [address] [id number]

[address] [place] [address]

                          09 Oct, 2014               

 

                          [facility]     [address] [id number]

[address] [place] [address]

                          22 Aug, 2014               

 

                          [facility]     [address] [id number]

[address] [place] [address]

                          22 Aug, 2014               

 

                          [facility]     [address] [id number]

[address] [place] [address]

                          10 Jul, 2014               

 

                          [facility]     [address] [id number]

[address] [place] [address]

                          10 Jul, 2014               

 

                          [facility]     [address] [id number]

[address] [place] [address]

                          23 May, 2014               

 

                          [facility]     [address] [id number]

[address] [place] [address]

                          23 May, 2014               

 

                          [facility]     [address] [id number]

[address] [place] [address]

                                         

 

                          [facility]     [address] [id number]

[address] [place] [address]

                          05 Dec, 2012               

 

                          [facility]     [address] [id number]

[address] [place] [address]

                          05 Dec, 2012               

 

                          [facility]     [address] [id number]

[address] [place] [address]

                          17 Dec, 2008               







IMMUNIZATIONS

No Known Immunizations



SOCIAL HISTORY

Never Assessed



REASON FOR VISIT

[facility]



PLAN OF CARE





VITAL SIGNS





MEDICATIONS

Unknown Medications



RESULTS

No Results



PROCEDURES

No Known procedures



INSTRUCTIONS





MEDICATIONS ADMINISTERED

No Known Medications



MEDICAL (GENERAL) HISTORY





                    Type                Description         Date

 

                    Medical History     Diabetes-II          

 

                    Surgical History    Tonsils removed     

 

                    Surgical History    Blood infection     

 

                    Hospitalization History ER visit for boil   2018

## 2020-12-12 ENCOUNTER — HOSPITAL ENCOUNTER (EMERGENCY)
Dept: HOSPITAL 75 - ER | Age: 20
Discharge: HOME | End: 2020-12-12
Payer: SELF-PAY

## 2020-12-12 VITALS — BODY MASS INDEX: 32.98 KG/M2 | WEIGHT: 197.98 LBS | HEIGHT: 65 IN

## 2020-12-12 VITALS — DIASTOLIC BLOOD PRESSURE: 86 MMHG | SYSTOLIC BLOOD PRESSURE: 129 MMHG

## 2020-12-12 DIAGNOSIS — Z88.8: ICD-10-CM

## 2020-12-12 DIAGNOSIS — F17.210: ICD-10-CM

## 2020-12-12 DIAGNOSIS — K08.89: Primary | ICD-10-CM

## 2020-12-12 PROCEDURE — 99283 EMERGENCY DEPT VISIT LOW MDM: CPT

## 2020-12-12 NOTE — ED EENT
History of Present Illness


General


Chief Complaint:  Dental Problems/Pain


Stated Complaint:  TOOTHACHE


Nursing Triage Note:  


PT AMBULATE TO TRIAGE WITH C/O RIGHT UPPER TOOTH PAIN X1 WEEK. PT REPORTS TOOTH 


"HAS BEEN BAD" X2 MONTHS. PT REPORTS THAT SHE DID NOT SCHEDULE AN APPT WHEN 


FIRST CONTACTING DENTIS X2 MONTHS AGO AND THAT SHE DID SCHEDULE AN APPT FOR 


JANUARY OF NEXT YEAR.


Source:  patient


Exam Limitations:  no limitations





History of Present Illness


Date Seen by Provider:  Dec 12, 2020


Time Seen by Provider:  22:40


Initial Comments


Patient is a 20-year-old female who presents to the emergency room with a chief 

complaint of right upper posterior molar tooth pain for 1 week.  Patient states 

she has been taking Tylenol and ibuprofen without any relief of symptoms.  

Patient states she does not have a dental appointment scheduled until January 3.

 Patient denies any fevers or chills.  She has pain with eating.  Denies any 

swelling.





All other review of systems reviewed and negative except as stated.


Timing/Duration:  gradual


Location:  dental


Prearrival Treatment:  over the counter meds


Associated Symptoms:  denies symptoms





Allergies and Home Medications


Allergies


Coded Allergies:  


     ceftriaxone (Unverified  Allergy, Mild, HIVES, 8/30/10)





Home Medications


Amoxicillin 500 Mg Capsule, 500 MG PO TID


   Prescribed by: ABHI ARCHULETA on 6/13/20 1625


Hydrocodone Bit/Acetaminophen 1 Tab Tab, 1 EACH PO Q6H PRN for PAIN


   Prescribed by: NAEL SANTOS on 9/2/18 2151


Hydrocodone Bit/Acetaminophen 1 Tab Tab, 1 EACH PO Q4-6HR PRN for PAIN-MODERATE


   Prescribed by: NAEL SANTOS on 3/17/19 2109


Naproxen 500 Mg Tablet, 500 MG PO BID PRN for PAIN-SEVERE (8-10)


   Prescribed by: ABHI ARCHULETA on 6/13/20 1625


Nitrofurantoin Monohyd/M-Cryst 100 Mg Capsule, 1 TAB PO BID


   Prescribed by: NAEL SANTOS on 3/17/19 2109


Ondansetron HCl 4 Mg Tab, 4 MG PO Q4H PRN for NAUSEA/VOMITING


   Prescribed by: NAEL SANTOS on 3/17/19 2109


Sulfamethoxazole/Trimethoprim 1 Each Tablet, 1 EACH PO BID


   Prescribed by: NAEL SANTOS on 9/2/18 2151





Patient Home Medication List


Home Medication List Reviewed:  Yes





Review of Systems


Review of Systems


Constitutional:  no symptoms reported


Eyes:  No Symptoms Reported


Ears:  No Symptoms Reported


Nose:  no symptoms reported


Mouth:  see HPI


Throat:  no symptoms reported


Respiratory:  no symptoms reported


Cardiovascular:  no symptoms reported





Past Medical-Social-Family Hx


Patient Social History


Alcohol Use:  Denies Use


Recreational Drug Use:  No


Smoking Status:  Current Everyday Smoker


Type Used:  Cigarettes


2nd Hand Smoke Exposure:  Yes


Recent Foreign Travel:  No


Contact w/Someone Who Travel:  No


Recent Infectious Disease Expo:  No


Recent Hopitalizations:  No


Physical Abuse:  No


Sexual Abuse:  No


Mistreated:  No


Fear:  No





Immunizations Up To Date


Tetanus Booster (TDap):  Less than 5yrs


PED Vaccines UTD:  No


Date of Pneumonia Vaccine:  Jan 7, 2013


Date of Influenza Vaccine:  Jan 7, 2013





Seasonal Allergies


Seasonal Allergies:  Yes





Past Medical History


Surgeries:  Yes (T&A, MULTIPLE ABSCESS I&D'S, "BLADDER STRETCHED" WHEN YOUNGER)


Adenoidectomy, Bladder Surgery, Tonsillectomy


Respiratory:  No


Cardiac:  No


Neurological:  No


Female Reproductive Disorders:  Denies


Genitourinary:  Yes


UTI-Chronic


Gastrointestinal:  Yes (Chronic constipation)


Chronic Constipation


Musculoskeletal:  No


Endocrine:  No


Diabetes, Insulin dep


HEENT:  No


Cancer:  No


Psychosocial:  No


Integumentary:  Yes


Blood Disorders:  No





Physical Exam


Vital Signs





Vital Signs - First Documented








 12/12/20





 22:27


 


Temp 36.4


 


Pulse 70


 


Resp 17


 


B/P (MAP) 129/86 (100)


 


O2 Delivery Room Air








Height, Weight, BMI


Height: 5'5.00"


Weight: 200lbs. 0oz. 90.161776tm; 32.00 BMI


Method:Stated


General Appearance:  WD/WN, no apparent distress


Eyes:  bilateral eye normal inspection, bilateral eye PERRL, bilateral eye EOMI


Ears:  bilateral ear auricle normal


Nose:  normal inspection


Mouth/Throat:  pharynx normal, dental tenderness (Right upper posterior molar)


Neck:  full range of motion, supple


Cardiovascular:  regular rate, rhythm


Respiratory:  no respiratory distress, no accessory muscle use





Progress/Results/Core Measures


Results/Orders


My Orders





Orders - SOPHIA LINARES MD


Hydrocodone/Apap 7.5/325 Tab (Lortab 7. (12/12/20 23:00)





Medications Given in ED





Current Medications








 Medications  Dose


 Ordered  Sig/Yamel


 Route  Start Time


 Stop Time Status Last Admin


Dose Admin


 


 Acetaminophen/


 Hydrocodone Bitart  1 ea  ONCE  ONCE


 PO  12/12/20 23:00


 12/12/20 23:01 DC 12/12/20 22:59


1 EA








Vital Signs/I&O











 12/12/20





 22:27


 


Temp 36.4


 


Pulse 70


 


Resp 17


 


B/P (MAP) 129/86 (100)


 


O2 Delivery Room Air














Blood Pressure Mean:                    100











Progress


Progress Note :  


   Time:  23:09


Progress Note


Patient with dental pain, no signs of abscess.  Patient will be treated here in 

the emergency department with Norco 7.5 mg.  She will be given a prescription 

for some Ultram at night for 2 days.  Also placed on Pen-Vee K for infectious 

prophylaxis.  Patient has a pending dental appointment she is given good return 

precautions she verbalized understanding all questions are sought and answered 

and she is stable for discharge.





Departure


Impression





   Primary Impression:  


   Pain, dental


Disposition:  01 HOME, SELF-CARE


Condition:  Stable





Departure-Patient Inst.


Decision time for Depature:  23:11


Referrals:  


[facility] (PCP/Family)


Primary Care Physician


Patient Instructions:  Fractured Tooth (DC), Dental Pain





Add. Discharge Instructions:  


Keep your dentist appointment as scheduled.


Alternate Tylenol and ibuprofen as needed for pain.


I have given you a prescription for tramadol to take with severe pain.


I have also written you a prescription for antibiotics that has been sent to 

your [facility] pharmacy.


Swish and spit half peroxide half water solution twice daily





Return to the emergency department for any worsening pain especially with 

swelling, fever or other emergent concerning symptoms.





All discharge instructions reviewed with patient and/or family. Voiced 

understanding.


Scripts


Penicillin V Potassium (Penicillin V Potassium) 500 Mg Tablet


500 MG PO Q6H, #28 TAB


   Prov: SOPHIA LINARES MD         12/12/20 


Tramadol HCl (Tramadol HCl) 50 Mg Tablet


50 MG PO Q6H PRN for severe pain, #8 TAB


   Prov: SOPHIA LINARES MD         12/12/20











SOPHIA LINARES MD         Dec 12, 2020 23:13

## 2020-12-13 NOTE — NUR
THIS WRITER/RN SPOKE TO GILLES AT THIS TIME AND RX FOR TRAMADOL NOT TAKEN WITH 
HER ON DC WILL BE AT NURSES STATION FOR HER TO  SUNDAY AT HER 
CONVENIENCE.

## 2022-04-15 ENCOUNTER — HOSPITAL ENCOUNTER (EMERGENCY)
Dept: HOSPITAL 75 - ER | Age: 22
Discharge: HOME | End: 2022-04-15
Payer: SELF-PAY

## 2022-04-15 VITALS — DIASTOLIC BLOOD PRESSURE: 79 MMHG | SYSTOLIC BLOOD PRESSURE: 128 MMHG

## 2022-04-15 VITALS — HEIGHT: 66.02 IN | WEIGHT: 165.35 LBS | BODY MASS INDEX: 26.57 KG/M2

## 2022-04-15 DIAGNOSIS — Z79.4: ICD-10-CM

## 2022-04-15 DIAGNOSIS — E11.9: ICD-10-CM

## 2022-04-15 DIAGNOSIS — Z3A.01: ICD-10-CM

## 2022-04-15 DIAGNOSIS — O26.851: Primary | ICD-10-CM

## 2022-04-15 DIAGNOSIS — O23.591: ICD-10-CM

## 2022-04-15 DIAGNOSIS — O20.8: ICD-10-CM

## 2022-04-15 LAB
ALBUMIN SERPL-MCNC: 4.2 GM/DL (ref 3.2–4.5)
ALP SERPL-CCNC: 37 U/L (ref 40–136)
ALT SERPL-CCNC: 22 U/L (ref 0–55)
APTT PPP: YELLOW S
BACTERIA #/AREA URNS HPF: NEGATIVE /HPF
BASOPHILS # BLD AUTO: 0.1 10^3/UL (ref 0–0.1)
BASOPHILS NFR BLD AUTO: 1 % (ref 0–10)
BILIRUB SERPL-MCNC: 0.4 MG/DL (ref 0.1–1)
BILIRUB UR QL STRIP: NEGATIVE
BUN/CREAT SERPL: 20
CALCIUM SERPL-MCNC: 9.3 MG/DL (ref 8.5–10.1)
CHLORIDE SERPL-SCNC: 106 MMOL/L (ref 98–107)
CO2 SERPL-SCNC: 18 MMOL/L (ref 21–32)
CREAT SERPL-MCNC: 0.66 MG/DL (ref 0.6–1.3)
EOSINOPHIL # BLD AUTO: 0.2 10^3/UL (ref 0–0.3)
EOSINOPHIL NFR BLD AUTO: 2 % (ref 0–10)
FIBRINOGEN PPP-MCNC: CLEAR MG/DL
GFR SERPLBLD BASED ON 1.73 SQ M-ARVRAT: 126 ML/MIN
GLUCOSE SERPL-MCNC: 91 MG/DL (ref 70–105)
GLUCOSE UR STRIP-MCNC: (no result) MG/DL
HCT VFR BLD CALC: 41 % (ref 35–52)
HGB BLD-MCNC: 14.3 G/DL (ref 11.5–16)
KETONES UR QL STRIP: NEGATIVE
LEUKOCYTE ESTERASE UR QL STRIP: NEGATIVE
LYMPHOCYTES # BLD AUTO: 3 10^3/UL (ref 1–4)
LYMPHOCYTES NFR BLD AUTO: 30 % (ref 12–44)
MANUAL DIFFERENTIAL PERFORMED BLD QL: NO
MCH RBC QN AUTO: 31 PG (ref 25–34)
MCHC RBC AUTO-ENTMCNC: 35 G/DL (ref 32–36)
MCV RBC AUTO: 89 FL (ref 80–99)
MONOCYTES # BLD AUTO: 0.6 10^3/UL (ref 0–1)
MONOCYTES NFR BLD AUTO: 6 % (ref 0–12)
NEUTROPHILS # BLD AUTO: 6.3 10^3/UL (ref 1.8–7.8)
NEUTROPHILS NFR BLD AUTO: 61 % (ref 42–75)
NITRITE UR QL STRIP: NEGATIVE
PH UR STRIP: 5 [PH] (ref 5–9)
PLATELET # BLD: 309 10^3/UL (ref 130–400)
PMV BLD AUTO: 8.9 FL (ref 9–12.2)
POTASSIUM SERPL-SCNC: 3.7 MMOL/L (ref 3.6–5)
PROT SERPL-MCNC: 6.7 GM/DL (ref 6.4–8.2)
PROT UR QL STRIP: NEGATIVE
RBC #/AREA URNS HPF: (no result) /HPF
SODIUM SERPL-SCNC: 137 MMOL/L (ref 135–145)
SP GR UR STRIP: 1.02 (ref 1.02–1.02)
SQUAMOUS #/AREA URNS HPF: (no result) /HPF
WBC # BLD AUTO: 10.3 10^3/UL (ref 4.3–11)
WBC #/AREA URNS HPF: (no result) /HPF

## 2022-04-15 PROCEDURE — 87210 SMEAR WET MOUNT SALINE/INK: CPT

## 2022-04-15 PROCEDURE — 84702 CHORIONIC GONADOTROPIN TEST: CPT

## 2022-04-15 PROCEDURE — 36415 COLL VENOUS BLD VENIPUNCTURE: CPT

## 2022-04-15 PROCEDURE — 76801 OB US < 14 WKS SINGLE FETUS: CPT

## 2022-04-15 PROCEDURE — 86901 BLOOD TYPING SEROLOGIC RH(D): CPT

## 2022-04-15 PROCEDURE — 76817 TRANSVAGINAL US OBSTETRIC: CPT

## 2022-04-15 PROCEDURE — 80053 COMPREHEN METABOLIC PANEL: CPT

## 2022-04-15 PROCEDURE — 81000 URINALYSIS NONAUTO W/SCOPE: CPT

## 2022-04-15 PROCEDURE — 85025 COMPLETE CBC W/AUTO DIFF WBC: CPT

## 2022-04-15 PROCEDURE — 86900 BLOOD TYPING SEROLOGIC ABO: CPT

## 2022-04-15 NOTE — DIAGNOSTIC IMAGING REPORT
INDICATION: Threatened miscarriage.



EXAMINATION: OB ultrasound.



Uterus is normal in size, shape and position. There is

intrauterine pregnancy. The embryo measures 9 mm in length

corresponding to a gestational age of 7 weeks. Heart rate was

detected at 110 bpm. Yolk sac was not seen. Placental location is

indeterminate. Amniotic fluid volume appeared normal. The adnexa

are unremarkable.



IMPRESSION: Single living intrauterine pregnancy with estimated

gestational age of 7 weeks.



Dictated by: 



  Dictated on workstation # [id number]

## 2022-04-15 NOTE — ED GENERAL
General


Chief Complaint:  OB < 20 WEEKS


Stated Complaint:  APPROX 7 WKS PREG/VAG BLEEDING


Source of Information:  Patient


Exam Limitations:  No Limitations





History of Present Illness


Date Seen by Provider:  Apr 15, 2022


Time Seen by Provider:  14:36


Initial Comments


This is a well appearing 20 yo female G-1, P-0, A-0 who presented to the ER via 

POV with c/o light pink vaginal spotting with intermittent mild cramps for past 

3 days. States she is currently approximately 7 weeks pregnant with last day of 

LMP 2022. States she has appointment scheduled with OB for . 

Takes prenatal vitamin daily. She denies heavy bleeding or need for pad. Has 

light pink spotting intermittently when she wipes after voiding.  Denies pelvic 

pressure or pain. Has pink tinged clear/thick vaginal discharge. No odor she can

appreciate. No fever, cough, shortness of breath, diarrhea, or rashes.





Allergies and Home Medications


Allergies


Coded Allergies:  


     ceftriaxone (Unverified  Allergy, Mild, HIVES, 8/30/10)





Patient Home Medication List


Home Medication List Reviewed:  Yes


Amoxicillin (Amoxicillin) 500 Mg Capsule, 500 MG PO TID


   Prescribed by: ABHI ARCHULETA on 20


Hydrocodone Bit/Acetaminophen (Lortab  5 Mg Tablet) 1 Tab Tab, 1 EACH PO Q6H PRN

for PAIN


   Prescribed by: NAEL SANTOS on 18


Hydrocodone Bit/Acetaminophen (Lortab  5 Mg Tablet) 1 Tab Tab, 1 EACH PO Q4-6HR 

PRN for PAIN-MODERATE


   Prescribed by: NAEL SANTOS on 3/17/19 2109


Metronidazole (Metronidazole) 55 Gm Gel.w.pump, 0.75 % TP HS


   Prescribed by: GONZALES ABDI on 4/15/22 1626


Naproxen (Naprosyn) 500 Mg Tablet, 500 MG PO BID PRN for PAIN-SEVERE (8-10)


   Prescribed by: ABHI ARCHULETA on 20 162


Nitrofurantoin Monohyd/M-Cryst (Macrobid 100 mg Capsule) 100 Mg Capsule, 1 TAB 

PO BID


   Prescribed by: NAEL SANTOS on 3/17/19 2109


Ondansetron HCl (Zofran) 4 Mg Tab, 4 MG PO Q4H PRN for NAUSEA/VOMITING


   Prescribed by: NAEL SANTOS on 3/17/19 2109


Penicillin V Potassium (Penicillin V Potassium) 500 Mg Tablet, 500 MG PO Q6H


   Prescribed by: SOPHIA LINARES on 20


Sulfamethoxazole/Trimethoprim (Bactrim Ds Tablet) 1 Each Tablet, 1 EACH PO BID


   Prescribed by: NAEL SANTOS on 18


Tramadol HCl (Tramadol HCl) 50 Mg Tablet, 50 MG PO Q6H PRN for severe pain


   Prescribed by: SOPHIA LINARES on 20





Review of Systems


Review of Systems


Constitutional:  No dizziness, No fever, No malaise


EENTM:  no symptoms reported


Respiratory:  no symptoms reported


Cardiovascular:  no symptoms reported


Gastrointestinal:  see HPI


Genitourinary:  see HPI


Pregnant:  Yes


LMP:  2022


Musculoskeletal:  no symptoms reported


Skin:  no symptoms reported


Psychiatric/Neurological:  No Symptoms Reported


Hematologic/Lymphatic:  No Symptoms Reported


Immunological/Allergic:  no symptoms reported





Past Medical-Social-Family Hx


Immunizations Up To Date


Tetanus Booster (TDap):  Less than 5yrs


PED Vaccines UTD:  No





Seasonal Allergies


Seasonal Allergies:  Yes





Past Medical History


Surgeries:  Yes (T&A, MULTIPLE ABSCESS I&D'S, "BLADDER STRETCHED" WHEN YOUNGER)


Adenoidectomy, Bladder Surgery, Tonsillectomy


Respiratory:  No


Cardiac:  No


Neurological:  No


Female Reproductive Disorders:  Denies


Genitourinary:  Yes


UTI-Chronic


Gastrointestinal:  Yes (Chronic constipation)


Chronic Constipation


Musculoskeletal:  No


Endocrine:  No


Diabetes, Insulin dep


HEENT:  No


Cancer:  No


Psychosocial:  No


Integumentary:  Yes


Blood Disorders:  No





Physical Exam


Vital Signs





Vital Signs - First Documented








 4/15/22





 15:14


 


Temp 36.1


 


Pulse 76


 


Resp 14


 


B/P (MAP) 124/84 (97)


 


Pulse Ox 99


 


O2 Delivery Room Air





Capillary Refill :


Height, Weight, BMI


Height: 5'5.00"


Weight: 200lbs. 0oz. 90.997204oj; 32.00 BMI


Method:Stated


General Appearance:  No Apparent Distress, WD/WN


Eyes:  Bilateral Eye Normal Inspection, Bilateral Eye PERRL, Bilateral Eye EOMI


HEENT:  PERRL/EOMI, Normal ENT Inspection, Pharynx Normal, Moist Mucous 

Membranes


Neck:  Normal Inspection, Non Tender, Supple


Respiratory:  Lungs Clear, Normal Breath Sounds, No Accessory Muscle Use, No 

Respiratory Distress


Cardiovascular:  Regular Rate, Rhythm, No Edema, Normal Peripheral Pulses


Gastrointestinal:  Normal Bowel Sounds, Non Tender, Soft


Back:  Normal Inspection, No Vertebral Tenderness


Extremity:  Normal Inspection, Normal Range of Motion


Neurologic/Psychiatric:  Alert, Oriented x3, No Motor/Sensory Deficits, Normal 

Mood/Affect


Skin:  Normal Color, Warm/Dry





Progress/Results/Core Measures


Suspected Sepsis


SIRS


Temperature: 


Pulse:  


Respiratory Rate: 


 


Laboratory Tests


4/15/22 15:05: White Blood Count 10.3


Blood Pressure  / 


Mean: 


 





Laboratory Tests


4/15/22 15:05: 


Creatinine 0.66, Platelet Count 309, Total Bilirubin 0.4








Results/Orders


Lab Results





Laboratory Tests








Test


 4/15/22


14:40 4/15/22


15:05 Range/Units


 


 


Urine Color YELLOW    


 


Urine Clarity CLEAR    


 


Urine pH 5.0   5-9  


 


Urine Specific Gravity 1.025 H  1.016-1.022  


 


Urine Protein NEGATIVE   NEGATIVE  


 


Urine Glucose (UA) 3+ H  NEGATIVE  


 


Urine Ketones NEGATIVE   NEGATIVE  


 


Urine Nitrite NEGATIVE   NEGATIVE  


 


Urine Bilirubin NEGATIVE   NEGATIVE  


 


Urine Urobilinogen 0.2   < = 1.0  MG/DL


 


Urine Leukocyte Esterase NEGATIVE   NEGATIVE  


 


Urine RBC (Auto) NEGATIVE   NEGATIVE  


 


Urine RBC NONE    /HPF


 


Urine WBC NONE    /HPF


 


Urine Squamous Epithelial


Cells 0-2 


 


  /HPF





 


Urine Crystals NONE    /LPF


 


Urine Bacteria NEGATIVE    /HPF


 


Urine Casts NONE    /LPF


 


Urine Mucus NEGATIVE    /LPF


 


Urine Culture Indicated NO    


 


White Blood Count


 


 10.3 


 4.3-11.0


10^3/uL


 


Red Blood Count


 


 4.65 


 3.80-5.11


10^6/uL


 


Hemoglobin  14.3  11.5-16.0  g/dL


 


Hematocrit  41  35-52  %


 


Mean Corpuscular Volume  89  80-99  fL


 


Mean Corpuscular Hemoglobin  31  25-34  pg


 


Mean Corpuscular Hemoglobin


Concent 


 35 


 32-36  g/dL





 


Red Cell Distribution Width  12.3  10.0-14.5  %


 


Platelet Count


 


 309 


 130-400


10^3/uL


 


Mean Platelet Volume  8.9 L 9.0-12.2  fL


 


Immature Granulocyte % (Auto)  0   %


 


Neutrophils (%) (Auto)  61  42-75  %


 


Lymphocytes (%) (Auto)  30  12-44  %


 


Monocytes (%) (Auto)  6  0-12  %


 


Eosinophils (%) (Auto)  2  0-10  %


 


Basophils (%) (Auto)  1  0-10  %


 


Neutrophils # (Auto)


 


 6.3 


 1.8-7.8


10^3/uL


 


Lymphocytes # (Auto)


 


 3.0 


 1.0-4.0


10^3/uL


 


Monocytes # (Auto)


 


 0.6 


 0.0-1.0


10^3/uL


 


Eosinophils # (Auto)


 


 0.2 


 0.0-0.3


10^3/uL


 


Basophils # (Auto)


 


 0.1 


 0.0-0.1


10^3/uL


 


Immature Granulocyte # (Auto)


 


 0.0 


 0.0-0.1


10^3/uL


 


Sodium Level  137  135-145  MMOL/L


 


Potassium Level  3.7  3.6-5.0  MMOL/L


 


Chloride Level  106    MMOL/L


 


Carbon Dioxide Level  18 L 21-32  MMOL/L


 


Anion Gap  13  5-14  MMOL/L


 


Blood Urea Nitrogen  13  7-18  MG/DL


 


Creatinine


 


 0.66 


 0.60-1.30


MG/DL


 


Estimat Glomerular Filtration


Rate 


 126 


  





 


BUN/Creatinine Ratio  20   


 


Glucose Level  91    MG/DL


 


Calcium Level  9.3  8.5-10.1  MG/DL


 


Corrected Calcium  9.1  8.5-10.1  MG/DL


 


Total Bilirubin  0.4  0.1-1.0  MG/DL


 


Aspartate Amino Transf


(AST/SGOT) 


 15 


 5-34  U/L





 


Alanine Aminotransferase


(ALT/SGPT) 


 22 


 0-55  U/L





 


Alkaline Phosphatase  37 L   U/L


 


Total Protein  6.7  6.4-8.2  GM/DL


 


Albumin  4.2  3.2-4.5  GM/DL


 


Human Chorionic Gonadotropin,


Quant 


 [id number] H


 <5  MIU/ML











Micro Results





Microbiology


4/15/22 Wet Prep - Final, Complete


          





My Orders





Orders - GONZALES ABDI


Ua Culture If Indicated (4/15/22 14:42)


Cbc With Automated Diff (4/15/22 14:49)


Comprehensive Metabolic Panel (4/15/22 14:49)


Wet Prep (4/15/22 14:49)


Hcg,Quantitative (4/15/22 14:49)


Abo Rh Type (4/15/22 14:49)


Us Ob<14 Wks Sngle W/Transvag (4/15/22 15:20)





Vital Signs/I&O











 4/15/22





 15:14


 


Temp 36.1


 


Pulse 76


 


Resp 14


 


B/P (MAP) 124/84 (97)


 


Pulse Ox 99


 


O2 Delivery Room Air





Capillary Refill :


Progress Note :  


Progress Note


Patient examined and in no acute distress. Will obtain basic labs, ABO RH, HCG 

level, UA and wet prep. Reviewed causes  of light spotting/cramping in early 

pregnancy such as implantation bleeding/cramping,  subchorionic hematoma, 

infection, miscarriage. Has no severe abdominal pain or heavy bleeding making 

ectopic pregnancy unlikely at this time, however will obtain US to verify 

intrauterine pregnancy. 





Labs, imaging reviewed. Wet prep shows few clue cells. US shows embryo measures 

9 mm in length corresponding to a gestational age of 7 weeks. Heart rate was 

detected at 110 bpm. Yolk sac was not seen. Reviewed labs and findings with 

patient. Will have her rest, have close follow up for DM and use of Jardiance. 

Will also prescribe Metrogel x 5 days for BV. Discharge POC reviewed and she is 

agreeable with plan.





Diagnostic Imaging





Comments


[facility].


[place], [place]





NAME:   TSERING SINGHALLI TSANG


[facility] REC#:   [id number]


ACCOUNT#:   [id number]


PT STATUS:   REG ER


:   2000


PHYSICIAN:   GONZALES ABDI


ADMIT DATE:   04/15/22/ER


***Draft***


Date of Exam:04/15/22





US OB<14 WKS SNGLE W/TRANSVAG








INDICATION: Threatened miscarriage.





EXAMINATION: OB ultrasound.





Uterus is normal in size, shape and position. There is


intrauterine pregnancy. The embryo measures 9 mm in length


corresponding to a gestational age of 7 weeks. Heart rate was


detected at 110 bpm. Yolk sac was not seen. Placental location is


indeterminate. Amniotic fluid volume appeared normal. The adnexa


are unremarkable.





IMPRESSION: Single living intrauterine pregnancy with estimated


gestational age of 7 weeks.





  Dictated on workstation # [id number]








Dict:   04/15/22 1610


Trans:   04/15/22 1614


[facility] [id number]





Interpreted by:     ALISSA GRUBBS MD


Electronically signed by:





Departure


Impression





   Primary Impression:  


   Spotting during pregnancy in first trimester


   Additional Impressions:  


   Bacterial vaginosis in pregnancy


   Subchorionic hematoma in first trimester


Disposition:  01 HOME, SELF-CARE


Condition:  Improved





Departure-Patient Inst.


Decision time for Depature:  16:05


Referrals:  


[facility] (PCP/Family)


Primary Care Physician


Patient Instructions:  Bacterial Vaginosis (DC), Bleeding in Early Pregnancy 

(DC), Care During Pregnancy for People With Type 1 or Type 2 Diabetes, 

Medications and Pregnancy, Subchorionic Bleeding





Add. Discharge Instructions:  


Plan: 


1. Call [facility] tomorrow to schedule closer follow up. Will need to discuss control 

of diabetes with pregnancy. Hold Jardiance at this time. 


2. Rest. Nothing in the vagina, no intercourse. 


4. As discussed you are higher risk for spontaneous miscarriage due to 

subchorionic bleed, return to the emergency department if you start having heavy

bleeding or cramping with dizziness/lightheadedness, or if you are saturating 

pads. 


5. Return for any new, concerning, or worsening symptoms. 





All discharge instructions reviewed with patient and/or family. Voiced 

understanding.


Scripts


Metronidazole (Metronidazole) 55 Gm Gel.w.pump


0.75 % TP HS for 5 Days, #55 GM 0 Refills


   Prov: GONZALES ABDI APRN         4/15/22


Work/School Note:  Work Release Form   Date Seen in the Emergency Department:  

Apr 15, 2022


   Return to Work:  2022


   Restrictions:  No Restrictions





Copy


Copies To 1:   [facility]/GONZALES ARREOLA APRN           Apr 15, 2022 14:59

## 2022-09-06 ENCOUNTER — HOSPITAL ENCOUNTER (OUTPATIENT)
Dept: HOSPITAL 75 - ER | Age: 22
Setting detail: OBSERVATION
LOS: 1 days | Discharge: HOME | End: 2022-09-07
Attending: FAMILY MEDICINE | Admitting: FAMILY MEDICINE
Payer: MEDICAID

## 2022-09-06 VITALS — HEIGHT: 66.02 IN | WEIGHT: 213.41 LBS | BODY MASS INDEX: 34.3 KG/M2

## 2022-09-06 VITALS — SYSTOLIC BLOOD PRESSURE: 96 MMHG | DIASTOLIC BLOOD PRESSURE: 61 MMHG

## 2022-09-06 VITALS — SYSTOLIC BLOOD PRESSURE: 134 MMHG | DIASTOLIC BLOOD PRESSURE: 84 MMHG

## 2022-09-06 DIAGNOSIS — R00.0: ICD-10-CM

## 2022-09-06 DIAGNOSIS — O24.913: Primary | ICD-10-CM

## 2022-09-06 DIAGNOSIS — U07.1: ICD-10-CM

## 2022-09-06 DIAGNOSIS — O98.513: ICD-10-CM

## 2022-09-06 DIAGNOSIS — E86.0: ICD-10-CM

## 2022-09-06 DIAGNOSIS — Z87.891: ICD-10-CM

## 2022-09-06 DIAGNOSIS — O99.283: ICD-10-CM

## 2022-09-06 DIAGNOSIS — Z79.899: ICD-10-CM

## 2022-09-06 DIAGNOSIS — O26.893: ICD-10-CM

## 2022-09-06 LAB
ALBUMIN SERPL-MCNC: 3.6 GM/DL (ref 3.2–4.5)
ALP SERPL-CCNC: 54 U/L (ref 40–136)
ALT SERPL-CCNC: 32 U/L (ref 0–55)
APTT PPP: YELLOW S
BACTERIA #/AREA URNS HPF: (no result) /HPF
BARBITURATES UR QL: NEGATIVE
BASOPHILS # BLD AUTO: 0 10^3/UL (ref 0–0.1)
BASOPHILS NFR BLD AUTO: 1 % (ref 0–10)
BENZODIAZ UR QL SCN: NEGATIVE
BILIRUB SERPL-MCNC: 0.4 MG/DL (ref 0.1–1)
BILIRUB UR QL STRIP: NEGATIVE
BUN/CREAT SERPL: 14
CALCIUM SERPL-MCNC: 9.2 MG/DL (ref 8.5–10.1)
CHLORIDE SERPL-SCNC: 106 MMOL/L (ref 98–107)
CO2 SERPL-SCNC: 19 MMOL/L (ref 21–32)
COCAINE UR QL: NEGATIVE
CREAT SERPL-MCNC: 0.63 MG/DL (ref 0.6–1.3)
EOSINOPHIL # BLD AUTO: 0 10^3/UL (ref 0–0.3)
EOSINOPHIL NFR BLD AUTO: 1 % (ref 0–10)
FIBRINOGEN PPP-MCNC: CLEAR MG/DL
GFR SERPLBLD BASED ON 1.73 SQ M-ARVRAT: 127 ML/MIN
GLUCOSE SERPL-MCNC: 118 MG/DL (ref 70–105)
GLUCOSE UR STRIP-MCNC: NEGATIVE MG/DL
HCT VFR BLD CALC: 33 % (ref 35–52)
HGB BLD-MCNC: 11.7 G/DL (ref 11.5–16)
KETONES UR QL STRIP: NEGATIVE
LEUKOCYTE ESTERASE UR QL STRIP: NEGATIVE
LYMPHOCYTES # BLD AUTO: 0.4 10^3/UL (ref 1–4)
LYMPHOCYTES NFR BLD AUTO: 9 % (ref 12–44)
MANUAL DIFFERENTIAL PERFORMED BLD QL: NO
MCH RBC QN AUTO: 31 PG (ref 25–34)
MCHC RBC AUTO-ENTMCNC: 35 G/DL (ref 32–36)
MCV RBC AUTO: 88 FL (ref 80–99)
METHADONE UR QL SCN: NEGATIVE
MONOCYTES # BLD AUTO: 0.6 10^3/UL (ref 0–1)
MONOCYTES NFR BLD AUTO: 13 % (ref 0–12)
NEUTROPHILS # BLD AUTO: 3.7 10^3/UL (ref 1.8–7.8)
NEUTROPHILS NFR BLD AUTO: 77 % (ref 42–75)
NITRITE UR QL STRIP: NEGATIVE
OPIATES UR QL SCN: NEGATIVE
OXYCODONE UR QL: NEGATIVE
PH UR STRIP: 6 [PH] (ref 5–9)
PLATELET # BLD: 318 10^3/UL (ref 130–400)
PMV BLD AUTO: 9 FL (ref 9–12.2)
POTASSIUM SERPL-SCNC: 3.8 MMOL/L (ref 3.6–5)
PROPOXYPH UR QL: NEGATIVE
PROT SERPL-MCNC: 6.7 GM/DL (ref 6.4–8.2)
PROT UR QL STRIP: NEGATIVE
RBC #/AREA URNS HPF: (no result) /HPF
RENAL EPI CELLS #/AREA URNS HPF: (no result) /HPF
SODIUM SERPL-SCNC: 137 MMOL/L (ref 135–145)
SP GR UR STRIP: >=1.03 (ref 1.02–1.02)
SQUAMOUS #/AREA URNS HPF: (no result) /HPF
TRICYCLICS UR QL SCN: NEGATIVE
WBC # BLD AUTO: 4.8 10^3/UL (ref 4.3–11)
WBC #/AREA URNS HPF: (no result) /HPF

## 2022-09-06 PROCEDURE — 80053 COMPREHEN METABOLIC PANEL: CPT

## 2022-09-06 PROCEDURE — 84703 CHORIONIC GONADOTROPIN ASSAY: CPT

## 2022-09-06 PROCEDURE — 87088 URINE BACTERIA CULTURE: CPT

## 2022-09-06 PROCEDURE — 82550 ASSAY OF CK (CPK): CPT

## 2022-09-06 PROCEDURE — 80306 DRUG TEST PRSMV INSTRMNT: CPT

## 2022-09-06 PROCEDURE — 81000 URINALYSIS NONAUTO W/SCOPE: CPT

## 2022-09-06 PROCEDURE — 93005 ELECTROCARDIOGRAM TRACING: CPT

## 2022-09-06 PROCEDURE — 80320 DRUG SCREEN QUANTALCOHOLS: CPT

## 2022-09-06 PROCEDURE — 82947 ASSAY GLUCOSE BLOOD QUANT: CPT

## 2022-09-06 PROCEDURE — 85025 COMPLETE CBC W/AUTO DIFF WBC: CPT

## 2022-09-06 PROCEDURE — 86141 C-REACTIVE PROTEIN HS: CPT

## 2022-09-06 PROCEDURE — 87636 SARSCOV2 & INF A&B AMP PRB: CPT

## 2022-09-06 PROCEDURE — 80048 BASIC METABOLIC PNL TOTAL CA: CPT

## 2022-09-06 PROCEDURE — 36415 COLL VENOUS BLD VENIPUNCTURE: CPT

## 2022-09-06 NOTE — ED GENERAL
General


Chief Complaint:  COVID19 Suspect/Confirmed


Stated Complaint:  BODY ACHES,HA,DIZZINESS,CONGESTION,


Nursing Triage Note:  


PT AMB TO RM 9 WITH COMPLAINTS OF WEAKNESS, BE LIGHTHEADED, HEADACHE, AND 


TACHYCARDIA. PT STATES SHE HAS BEEN EXPOSED TO COVID AND IS 28 WEEKS PREGNANT.


Source of Information:  Patient


Exam Limitations:  No Limitations





History of Present Illness


Date Seen by Provider:  Sep 6, 2022


Time Seen by Provider:  17:14


Initial Comments


Patient to the ER by private conveyance chief complaint of body aches, malaise, 

tachycardia.  She saw Dr. Mojica was told to drink more fluids.  She thinks she 

has COVID because she had a sick contact with COVID-19.  She is having some 

nausea but no vomiting.  Chills but no objective fevers.  She is 28 weeks 

pregnant.





Allergies and Home Medications


Allergies


Coded Allergies:  


     ceftriaxone (Unverified  Allergy, Mild, HIVES, 8/30/10)





Patient Home Medication List


Home Medication List Reviewed:  Yes


Amoxicillin (Amoxicillin) 500 Mg Capsule, 500 MG PO TID


   Prescribed by: ABHI ARCHULETA on 6/13/20 1625


Hydrocodone Bit/Acetaminophen (Lortab  5 Mg Tablet) 1 Tab Tab, 1 EACH PO Q6H PRN

for PAIN


   Prescribed by: NAEL SANTOS on 9/2/18 2151


Hydrocodone Bit/Acetaminophen (Lortab  5 Mg Tablet) 1 Tab Tab, 1 EACH PO Q4-6HR 

PRN for PAIN-MODERATE


   Prescribed by: NAEL SANTOS on 3/17/19 2109


Metronidazole (Metronidazole) 55 Gm Gel.w.pump, 0.75 % TP HS


   Prescribed by: GONZALES ABDI on 4/15/22 1626


Naproxen (Naprosyn) 500 Mg Tablet, 500 MG PO BID PRN for PAIN-SEVERE (8-10)


   Prescribed by: ABHI ARCHULETA on 6/13/20 1625


Nitrofurantoin Monohyd/M-Cryst (Macrobid 100 mg Capsule) 100 Mg Capsule, 1 TAB 

PO BID


   Prescribed by: NAEL SANTOS on 3/17/19 2109


Ondansetron HCl (Zofran) 4 Mg Tab, 4 MG PO Q4H PRN for NAUSEA/VOMITING


   Prescribed by: NAEL SANTOS on 3/17/19 2109


Penicillin V Potassium (Penicillin V Potassium) 500 Mg Tablet, 500 MG PO Q6H


   Prescribed by: SOPHIA LINARES on 12/12/20 2313


Sulfamethoxazole/Trimethoprim (Bactrim Ds Tablet) 1 Each Tablet, 1 EACH PO BID


   Prescribed by: NAEL SANTOS on 9/2/18 2151


Tramadol HCl (Tramadol HCl) 50 Mg Tablet, 50 MG PO Q6H PRN for severe pain


   Prescribed by: SOPHIA LINARES on 12/12/20 2312





Review of Systems


Review of Systems


Constitutional:  see HPI, chills, malaise


EENTM:  No ear discharge, No hearing loss, No ear pain


Respiratory:  see HPI, cough; No short of breath


Cardiovascular:  No chest pain, No palpitations


Gastrointestinal:  No abdominal pain, No constipation, No diarrhea; nausea; No 

vomiting


Genitourinary:  No discharge, No dysuria





All Other Systems Reviewed


Negative Unless Noted:  Yes





Past Medical-Social-Family Hx


Patient Social History


Tobacco Use?:  No


Substance use?:  No


Alcohol Use?:  No





Immunizations Up To Date


Tetanus Booster (TDap):  Less than 5yrs


PED Vaccines UTD:  No


Influenza Vaccine Up-to-Date:  No; Not Current





Seasonal Allergies


Seasonal Allergies:  Yes





Past Medical History


Surgeries:  Yes (T&A, MULTIPLE ABSCESS I&D'S, "BLADDER STRETCHED" WHEN YOUNGER)


Adenoidectomy, Bladder Surgery, Tonsillectomy


Respiratory:  No


Cardiac:  No


Neurological:  No


Female Reproductive Disorders:  Denies


Genitourinary:  Yes


UTI-Chronic


Gastrointestinal:  Yes (Chronic constipation)


Chronic Constipation


Musculoskeletal:  No


Endocrine:  No


Diabetes, Insulin dep


HEENT:  No


Cancer:  No


Psychosocial:  No


Integumentary:  Yes


Blood Disorders:  No





Physical Exam


Vital Signs





Vital Signs - First Documented








 9/6/22





 16:58


 


Temp 37.2


 


Pulse 149


 


Resp 16


 


B/P (MAP) 134/84 (101)


 


Pulse Ox 97


 


O2 Delivery Room Air





Capillary Refill : Less Than 3 Seconds


Height, Weight, BMI


Height: 5'5.00"


Weight: 200lbs. 0oz. 90.222819bl; 34.00 BMI


Method:Stated


General Appearance:  WD/WN, Anxious, Mild Distress


Eyes:  Bilateral Eye Normal Inspection, Bilateral Eye PERRL, Bilateral Eye EOMI


HEENT:  PERRL/EOMI, TMs Normal, Pharynx Normal; No Moist Mucous Membranes


Neck:  Full Range of Motion, Normal Inspection


Respiratory:  Lungs Clear, Normal Breath Sounds, No Accessory Muscle Use, No 

Respiratory Distress


Cardiovascular:  Regular Rate, Rhythm, No Edema, Tachycardia (150)


Gastrointestinal:  Normal Bowel Sounds, Non Tender, Soft


Extremity:  Normal Inspection, Normal Range of Motion


Neurologic/Psychiatric:  Alert, Oriented x3, No Motor/Sensory Deficits, Normal 

Mood/Affect


Skin:  Normal Color, Warm/Dry





Progress/Results/Core Measures


Suspected Sepsis


SIRS


Temperature: 


Pulse: 149 


Respiratory Rate: 16


 


Laboratory Tests


9/6/22 17:14: White Blood Count 4.8


Blood Pressure 134 /84 


Mean: 101


 


Laboratory Tests


9/6/22 17:14: 


Creatinine 0.63, Platelet Count 318, Total Bilirubin 0.4








Results/Orders


Lab Results





Laboratory Tests








Test


 9/6/22


17:07 9/6/22


17:14 9/6/22


17:22 9/6/22


17:23 Range/Units


 


 


Influenza Type A (RT-PCR) Not Detected     Not Detecte  


 


Influenza Type B (RT-PCR) Not Detected     Not Detecte  


 


SARS-CoV-2 RNA (RT-PCR) Detected H    Not Detecte  


 


White Blood Count


 


 4.8 


 


 


 4.3-11.0


10^3/uL


 


Red Blood Count


 


 3.81 


 


 


 3.80-5.11


10^6/uL


 


Hemoglobin  11.7    11.5-16.0  g/dL


 


Hematocrit  33 L   35-52  %


 


Mean Corpuscular Volume  88    80-99  fL


 


Mean Corpuscular Hemoglobin  31    25-34  pg


 


Mean Corpuscular Hemoglobin


Concent 


 35 


 


 


 32-36  g/dL





 


Red Cell Distribution Width  12.2    10.0-14.5  %


 


Platelet Count


 


 318 


 


 


 130-400


10^3/uL


 


Mean Platelet Volume  9.0    9.0-12.2  fL


 


Immature Granulocyte % (Auto)  0     %


 


Neutrophils (%) (Auto)  77 H   42-75  %


 


Lymphocytes (%) (Auto)  9 L   12-44  %


 


Monocytes (%) (Auto)  13 H   0-12  %


 


Eosinophils (%) (Auto)  1    0-10  %


 


Basophils (%) (Auto)  1    0-10  %


 


Neutrophils # (Auto)


 


 3.7 


 


 


 1.8-7.8


10^3/uL


 


Lymphocytes # (Auto)


 


 0.4 L


 


 


 1.0-4.0


10^3/uL


 


Monocytes # (Auto)


 


 0.6 


 


 


 0.0-1.0


10^3/uL


 


Eosinophils # (Auto)


 


 0.0 


 


 


 0.0-0.3


10^3/uL


 


Basophils # (Auto)


 


 0.0 


 


 


 0.0-0.1


10^3/uL


 


Immature Granulocyte # (Auto)


 


 0.0 


 


 


 0.0-0.1


10^3/uL


 


Sodium Level  137    135-145  MMOL/L


 


Potassium Level  3.8    3.6-5.0  MMOL/L


 


Chloride Level  106      MMOL/L


 


Carbon Dioxide Level  19 L   21-32  MMOL/L


 


Anion Gap  12    5-14  MMOL/L


 


Blood Urea Nitrogen  9    7-18  MG/DL


 


Creatinine


 


 0.63 


 


 


 0.60-1.30


MG/DL


 


Estimat Glomerular Filtration


Rate 


 127 


 


 


  





 


BUN/Creatinine Ratio  14     


 


Glucose Level  118 H     MG/DL


 


Calcium Level  9.2    8.5-10.1  MG/DL


 


Corrected Calcium  9.5    8.5-10.1  MG/DL


 


Total Bilirubin  0.4    0.1-1.0  MG/DL


 


Aspartate Amino Transf


(AST/SGOT) 


 30 


 


 


 5-34  U/L





 


Alanine Aminotransferase


(ALT/SGPT) 


 32 


 


 


 0-55  U/L





 


Alkaline Phosphatase  54      U/L


 


C-Reactive Protein High


Sensitivity 


 0.74 H


 


 


 0.00-0.50


MG/DL


 


Total Protein  6.7    6.4-8.2  GM/DL


 


Albumin  3.6    3.2-4.5  GM/DL


 


Serum Alcohol  < 10    <10  MG/DL


 


Urine Color   YELLOW    


 


Urine Clarity   CLEAR    


 


Urine pH   6.0   5-9  


 


Urine Specific Gravity   >=1.030   1.016-1.022  


 


Urine Protein   NEGATIVE   NEGATIVE  


 


Urine Glucose (UA)   NEGATIVE   NEGATIVE  


 


Urine Ketones   NEGATIVE   NEGATIVE  


 


Urine Nitrite   NEGATIVE   NEGATIVE  


 


Urine Bilirubin   NEGATIVE   NEGATIVE  


 


Urine Urobilinogen   1.0   < = 1.0  MG/DL


 


Urine Leukocyte Esterase   NEGATIVE   NEGATIVE  


 


Urine RBC (Auto)   NEGATIVE   NEGATIVE  


 


Urine RBC   0-2    /HPF


 


Urine WBC   0-2    /HPF


 


Urine Squamous Epithelial


Cells 


 


 0-2 


 


  /HPF





 


Urine Renal Epithelial Cells   NONE    /HPF


 


Urine Crystals   NONE    /LPF


 


Urine Bacteria   MODERATE H   /HPF


 


Urine Casts   NONE    /LPF


 


Urine Mucus   NEGATIVE    /LPF


 


Urine Culture Indicated   YES    


 


Urine Opiates Screen   NEGATIVE   NEGATIVE  


 


Urine Oxycodone Screen   NEGATIVE   NEGATIVE  


 


Urine Methadone Screen   NEGATIVE   NEGATIVE  


 


Urine Propoxyphene Screen   NEGATIVE   NEGATIVE  


 


Urine Barbiturates Screen   NEGATIVE   NEGATIVE  


 


Ur Tricyclic Antidepressants


Screen 


 


 NEGATIVE 


 


 NEGATIVE  





 


Urine Phencyclidine Screen   NEGATIVE   NEGATIVE  


 


Urine Amphetamines Screen   NEGATIVE   NEGATIVE  


 


Urine Methamphetamines Screen   NEGATIVE   NEGATIVE  


 


Urine Benzodiazepines Screen   NEGATIVE   NEGATIVE  


 


Urine Cocaine Screen   NEGATIVE   NEGATIVE  


 


Urine Cannabinoids Screen   NEGATIVE   NEGATIVE  


 


Glucometer    117 H   MG/DL


 


Test


 9/6/22


17:24 


 


 


 Range/Units


 


 


Total Creatine Kinase 20 L      U/L








My Orders





Orders - ANDREW HARTMAN


Continuous Ekg Monitoring (9/6/22 17:14)


Ekg Tracing (9/6/22 17:14)


Ed Iv/Invasive Line Start (9/6/22 17:14)


Ns Iv 1000 Ml (Sodium Chloride 0.9%) (9/6/22 17:15)


Cbc With Automated Diff (9/6/22 17:14)


Comprehensive Metabolic Panel (9/6/22 17:14)


Hs C Reactive Protein (9/6/22 17:14)


Ua Culture If Indicated (9/6/22 17:14)


Urine Pregnancy Bedside (9/6/22 17:14)


Drug Screen Stat (Urine) (9/6/22 17:14)


Alcohol (9/6/22 17:14)


Accucheck Stat ONCE (9/6/22 17:16)


Ekg Tracing (9/6/22 17:16)


Urine Culture (9/6/22 17:22)


Ondansetron Injection (Zofran Injectio (9/6/22 18:30)


Acetaminophen  Tablet (Tylenol  Tablet) (9/6/22 19:00)


Lactated Ringers (Lr 1000 Ml Iv Solution (9/6/22 19:00)


Creatine Kinase (9/6/22 18:49)





Medications Given in ED





Current Medications








 Medications  Dose


 Ordered  Sig/Yamel


 Route  Start Time


 Stop Time Status Last Admin


Dose Admin


 


 Acetaminophen  1,000 mg  ONCE  ONCE


 PO  9/6/22 19:00


 9/6/22 19:01 DC 9/6/22 18:51


1,000 MG


 


 Ondansetron HCl  4 mg  ONCE  ONCE


 IVP  9/6/22 18:30


 9/6/22 18:31 DC 9/6/22 18:35


4 MG








Vital Signs/I&O











 9/6/22





 16:58


 


Temp 37.2


 


Pulse 149


 


Resp 16


 


B/P (MAP) 134/84 (101)


 


Pulse Ox 97


 


O2 Delivery Room Air





Capillary Refill : Less Than 3 Seconds








Blood Pressure Mean:                    101











Point of Care Testing


Finger Stick Blood Glucose:  117


Blood Glucose Action Taken:  RN NOTIFIED


Progress Note :  


   Time:  18:52


Progress Note


They have a liter fluid bolus and started to slow her heart rate down.  Appears 

to be sinus on EKG.  Will give Tylenol and Zofran for her symptoms.  COVID is 

positive.  Appears to be sinus tachycardia.  We did discuss risks, benefits and 

alternatives to the use of Paxlovid for pregnant women.  She would like to 

consult with Dr. Rome before she considers taking it.  If after her second liter

of fluids and Tylenol she is feeling better than there may not be significant 

benefit to be had.  Will pass care of the case over to Dr. Guaman.





ECG


Initial ECG Impression Date:  Sep 6, 2022


Initial ECG Impression Time:  17:18


Initial ECG Rate:  153


Initial ECG Rhythm:  S.Tach


Initial ECG Intervals:  Normal


Initial ECG Impression:  Normal


Comment


Sinus tachycardia without clinically relevant ST changes.





Departure


Communication (Admissions)


Time/Spoke to Admitting Phy:  19:35


Discussed the case with Dr. Mojica who agrees with treatment plan thus far and 

would recommend observation, metoprolol 50 mg tartrate for heart rate over 140 

every 12 hours.  If she still tachycardic in the morning he will consider 

consultation to cardiology.  IV fluids 1-1/2 times maintenance





Impression





   Primary Impression:  


   COVID-19


   Additional Impressions:  


   Tachycardia


   Dehydration


Disposition:  09 ADMITTED AS INPATIENT


Condition:  Stable





Admissions


Decision to Admit Reason:  Admit from ER (General)


Decision to Admit/Date:  Sep 6, 2022


Time/Decision to Admit Time:  19:41





Departure-Patient Inst.


Referrals:  


SANJIV MOJICA MD (PCP/Family)


Primary Care Physician











ANDREW HARTMAN                  Sep 6, 2022 18:46

## 2022-09-07 VITALS — DIASTOLIC BLOOD PRESSURE: 63 MMHG | SYSTOLIC BLOOD PRESSURE: 98 MMHG

## 2022-09-07 VITALS — SYSTOLIC BLOOD PRESSURE: 98 MMHG | DIASTOLIC BLOOD PRESSURE: 63 MMHG

## 2022-09-07 VITALS — DIASTOLIC BLOOD PRESSURE: 66 MMHG | SYSTOLIC BLOOD PRESSURE: 102 MMHG

## 2022-09-07 VITALS — SYSTOLIC BLOOD PRESSURE: 102 MMHG | DIASTOLIC BLOOD PRESSURE: 60 MMHG

## 2022-09-07 VITALS — DIASTOLIC BLOOD PRESSURE: 57 MMHG | SYSTOLIC BLOOD PRESSURE: 94 MMHG

## 2022-09-07 LAB
BASOPHILS # BLD AUTO: 0 10^3/UL (ref 0–0.1)
BASOPHILS NFR BLD AUTO: 1 % (ref 0–10)
BUN/CREAT SERPL: 13
CALCIUM SERPL-MCNC: 8.4 MG/DL (ref 8.5–10.1)
CHLORIDE SERPL-SCNC: 103 MMOL/L (ref 98–107)
CO2 SERPL-SCNC: 21 MMOL/L (ref 21–32)
CREAT SERPL-MCNC: 0.64 MG/DL (ref 0.6–1.3)
EOSINOPHIL # BLD AUTO: 0 10^3/UL (ref 0–0.3)
EOSINOPHIL NFR BLD AUTO: 1 % (ref 0–10)
GFR SERPLBLD BASED ON 1.73 SQ M-ARVRAT: 126 ML/MIN
GLUCOSE SERPL-MCNC: 90 MG/DL (ref 70–105)
HCT VFR BLD CALC: 28 % (ref 35–52)
HGB BLD-MCNC: 9.7 G/DL (ref 11.5–16)
LYMPHOCYTES # BLD AUTO: 0.5 10^3/UL (ref 1–4)
LYMPHOCYTES NFR BLD AUTO: 21 % (ref 12–44)
MANUAL DIFFERENTIAL PERFORMED BLD QL: NO
MCH RBC QN AUTO: 31 PG (ref 25–34)
MCHC RBC AUTO-ENTMCNC: 34 G/DL (ref 32–36)
MCV RBC AUTO: 90 FL (ref 80–99)
MONOCYTES # BLD AUTO: 0.4 10^3/UL (ref 0–1)
MONOCYTES NFR BLD AUTO: 19 % (ref 0–12)
NEUTROPHILS # BLD AUTO: 1.3 10^3/UL (ref 1.8–7.8)
NEUTROPHILS NFR BLD AUTO: 58 % (ref 42–75)
PLATELET # BLD: 245 10^3/UL (ref 130–400)
PMV BLD AUTO: 9 FL (ref 9–12.2)
POTASSIUM SERPL-SCNC: 4 MMOL/L (ref 3.6–5)
SODIUM SERPL-SCNC: 130 MMOL/L (ref 135–145)
WBC # BLD AUTO: 2.2 10^3/UL (ref 4.3–11)

## 2022-09-07 RX ADMIN — SODIUM CHLORIDE, SODIUM LACTATE, POTASSIUM CHLORIDE, AND CALCIUM CHLORIDE SCH MLS/HR: 600; 310; 30; 20 INJECTION, SOLUTION INTRAVENOUS at 01:49

## 2022-09-07 RX ADMIN — SODIUM CHLORIDE, SODIUM LACTATE, POTASSIUM CHLORIDE, AND CALCIUM CHLORIDE SCH MLS/HR: 600; 310; 30; 20 INJECTION, SOLUTION INTRAVENOUS at 08:20

## 2022-09-07 RX ADMIN — SODIUM CHLORIDE, SODIUM LACTATE, POTASSIUM CHLORIDE, AND CALCIUM CHLORIDE SCH MLS/HR: 600; 310; 30; 20 INJECTION, SOLUTION INTRAVENOUS at 03:28

## 2022-09-07 NOTE — HISTORY & PHYSICAL
HPI


History of Present Illness:


22-year-old  1 para 0 currently at 28 weeks gestation who presents to ED 

following malaise and fatigue as well as a low-grade fever.  Ultimately she was 

determined to have Covid 19 infection along with dehydration.  She had started 

feeling poorly within the last 48 hours.  She admitted to occasionally a cough b

ut also had some airway congestion and slight rhinitis.  Her prenatal care is 

essentially been complicated by diabetes.  She is also on insulin provided 

through perinatology in [facility].


Source:  patient


Exam Limitations:  no limitations


Date seen by provider:  Sep 7, 2022


Time Seen by Provider:  07:15


Attending Physician


Sanjiv Mojica MD


PCP


Admitting Physician:


Sanjiv Mojica MD 








Attending Physician:


Sanjiv Mojica MD


Consult





Date of Admission


Sep 6, 2022 at 20:00





Home Medications


Home Medications


Reviewed patient Home Medication Reconciliation performed by pharmacy medication

reconciliations technician and/or nursing.


Patients Allergies have been reviewed.





Allergies


Coded Allergies:  


     ceftriaxone (Unverified  Allergy, Mild, HIVES, 8/30/10)





PMH-Social-Family Hx


Patient Social History


Marrital Status:  single


Smoking Status:  Former Smoker


2nd Hand Smoke Exposure:  Yes


Recent Hopitalizations:  No


Alcohol Use?:  No


Have you traveled recently?:  No





Immunizations Up To Date


Tetanus Booster (TDap):  Less than 5yrs


Influenza Vaccine Up-to-Date:  No; Not Current





Review of Systems ([facility])


Constitutional:  see HPI





Reviewed Test Results


Reviewed Test Results


Lab





Laboratory Tests








Test


 22


17:07 22


17:14 22


17:22 22


17:23 Range/Units


 


 


Influenza Type A (RT-PCR) Not Detected     Not Detecte  


 


Influenza Type B (RT-PCR) Not Detected     Not Detecte  


 


SARS-CoV-2 RNA (RT-PCR) Detected H    Not Detecte  


 


White Blood Count


 


 4.8 


 


 


 4.3-11.0


10^3/uL


 


Red Blood Count


 


 3.81 


 


 


 3.80-5.11


10^6/uL


 


Hemoglobin  11.7    11.5-16.0  g/dL


 


Hematocrit  33 L   35-52  %


 


Mean Corpuscular Volume  88    80-99  fL


 


Mean Corpuscular Hemoglobin  31    25-34  pg


 


Mean Corpuscular Hemoglobin


Concent 


 35 


 


 


 32-36  g/dL





 


Red Cell Distribution Width  12.2    10.0-14.5  %


 


Platelet Count


 


 318 


 


 


 130-400


10^3/uL


 


Mean Platelet Volume  9.0    9.0-12.2  fL


 


Immature Granulocyte % (Auto)  0     %


 


Neutrophils (%) (Auto)  77 H   42-75  %


 


Lymphocytes (%) (Auto)  9 L   12-44  %


 


Monocytes (%) (Auto)  13 H   0-12  %


 


Eosinophils (%) (Auto)  1    0-10  %


 


Basophils (%) (Auto)  1    0-10  %


 


Neutrophils # (Auto)


 


 3.7 


 


 


 1.8-7.8


10^3/uL


 


Lymphocytes # (Auto)


 


 0.4 L


 


 


 1.0-4.0


10^3/uL


 


Monocytes # (Auto)


 


 0.6 


 


 


 0.0-1.0


10^3/uL


 


Eosinophils # (Auto)


 


 0.0 


 


 


 0.0-0.3


10^3/uL


 


Basophils # (Auto)


 


 0.0 


 


 


 0.0-0.1


10^3/uL


 


Immature Granulocyte # (Auto)


 


 0.0 


 


 


 0.0-0.1


10^3/uL


 


Sodium Level  137    135-145  MMOL/L


 


Potassium Level  3.8    3.6-5.0  MMOL/L


 


Chloride Level  106      MMOL/L


 


Carbon Dioxide Level  19 L   21-32  MMOL/L


 


Anion Gap  12    5-14  MMOL/L


 


Blood Urea Nitrogen  9    7-18  MG/DL


 


Creatinine


 


 0.63 


 


 


 0.60-1.30


MG/DL


 


Estimat Glomerular Filtration


Rate 


 127 


 


 


  





 


BUN/Creatinine Ratio  14     


 


Glucose Level  118 H     MG/DL


 


Calcium Level  9.2    8.5-10.1  MG/DL


 


Corrected Calcium  9.5    8.5-10.1  MG/DL


 


Total Bilirubin  0.4    0.1-1.0  MG/DL


 


Aspartate Amino Transf


(AST/SGOT) 


 30 


 


 


 5-34  U/L





 


Alanine Aminotransferase


(ALT/SGPT) 


 32 


 


 


 0-55  U/L





 


Alkaline Phosphatase  54      U/L


 


C-Reactive Protein High


Sensitivity 


 0.74 H


 


 


 0.00-0.50


MG/DL


 


Total Protein  6.7    6.4-8.2  GM/DL


 


Albumin  3.6    3.2-4.5  GM/DL


 


Serum Alcohol  < 10    <10  MG/DL


 


Urine Color   YELLOW    


 


Urine Clarity   CLEAR    


 


Urine pH   6.0   5-9  


 


Urine Specific Gravity   >=1.030   1.016-1.022  


 


Urine Protein   NEGATIVE   NEGATIVE  


 


Urine Glucose (UA)   NEGATIVE   NEGATIVE  


 


Urine Ketones   NEGATIVE   NEGATIVE  


 


Urine Nitrite   NEGATIVE   NEGATIVE  


 


Urine Bilirubin   NEGATIVE   NEGATIVE  


 


Urine Urobilinogen   1.0   < = 1.0  MG/DL


 


Urine Leukocyte Esterase   NEGATIVE   NEGATIVE  


 


Urine RBC (Auto)   NEGATIVE   NEGATIVE  


 


Urine RBC   0-2    /HPF


 


Urine WBC   0-2    /HPF


 


Urine Squamous Epithelial


Cells 


 


 0-2 


 


  /HPF





 


Urine Renal Epithelial Cells   NONE    /HPF


 


Urine Crystals   NONE    /LPF


 


Urine Bacteria   MODERATE H   /HPF


 


Urine Casts   NONE    /LPF


 


Urine Mucus   NEGATIVE    /LPF


 


Urine Culture Indicated   YES    


 


Urine Opiates Screen   NEGATIVE   NEGATIVE  


 


Urine Oxycodone Screen   NEGATIVE   NEGATIVE  


 


Urine Methadone Screen   NEGATIVE   NEGATIVE  


 


Urine Propoxyphene Screen   NEGATIVE   NEGATIVE  


 


Urine Barbiturates Screen   NEGATIVE   NEGATIVE  


 


Ur Tricyclic Antidepressants


Screen 


 


 NEGATIVE 


 


 NEGATIVE  





 


Urine Phencyclidine Screen   NEGATIVE   NEGATIVE  


 


Urine Amphetamines Screen   NEGATIVE   NEGATIVE  


 


Urine Methamphetamines Screen   NEGATIVE   NEGATIVE  


 


Urine Benzodiazepines Screen   NEGATIVE   NEGATIVE  


 


Urine Cocaine Screen   NEGATIVE   NEGATIVE  


 


Urine Cannabinoids Screen   NEGATIVE   NEGATIVE  


 


Glucometer    117 H   MG/DL


 


Test


 22


17:24 22


05:54 


 


 Range/Units


 


 


Total Creatine Kinase 20 L      U/L


 


White Blood Count


 


 2.2 L


 


 


 4.3-11.0


10^3/uL


 


Red Blood Count


 


 3.14 L


 


 


 3.80-5.11


10^6/uL


 


Hemoglobin  9.7 L   11.5-16.0  g/dL


 


Hematocrit  28 L   35-52  %


 


Mean Corpuscular Volume  90    80-99  fL


 


Mean Corpuscular Hemoglobin  31    25-34  pg


 


Mean Corpuscular Hemoglobin


Concent 


 34 


 


 


 32-36  g/dL





 


Red Cell Distribution Width  12.4    10.0-14.5  %


 


Platelet Count


 


 245 


 


 


 130-400


10^3/uL


 


Mean Platelet Volume  9.0    9.0-12.2  fL


 


Immature Granulocyte % (Auto)  1     %


 


Neutrophils (%) (Auto)  58    42-75  %


 


Lymphocytes (%) (Auto)  21    12-44  %


 


Monocytes (%) (Auto)  19 H   0-12  %


 


Eosinophils (%) (Auto)  1    0-10  %


 


Basophils (%) (Auto)  1    0-10  %


 


Neutrophils # (Auto)


 


 1.3 L


 


 


 1.8-7.8


10^3/uL


 


Lymphocytes # (Auto)


 


 0.5 L


 


 


 1.0-4.0


10^3/uL


 


Monocytes # (Auto)


 


 0.4 


 


 


 0.0-1.0


10^3/uL


 


Eosinophils # (Auto)


 


 0.0 


 


 


 0.0-0.3


10^3/uL


 


Basophils # (Auto)


 


 0.0 


 


 


 0.0-0.1


10^3/uL


 


Immature Granulocyte # (Auto)


 


 0.0 


 


 


 0.0-0.1


10^3/uL


 


Sodium Level  130 L   135-145  MMOL/L


 


Potassium Level  4.0    3.6-5.0  MMOL/L


 


Chloride Level  103      MMOL/L


 


Carbon Dioxide Level  21    21-32  MMOL/L


 


Anion Gap  6    5-14  MMOL/L


 


Blood Urea Nitrogen  8    7-18  MG/DL


 


Creatinine


 


 0.64 


 


 


 0.60-1.30


MG/DL


 


Estimat Glomerular Filtration


Rate 


 126 


 


 


  





 


BUN/Creatinine Ratio  13     


 


Glucose Level  90      MG/DL


 


Calcium Level  8.4 L   8.5-10.1  MG/DL











Physical Exam-([facility])


Physical Exam


Vital Signs





                          VS - Last 72 Hours, by Label








 22





 16:58 20:46 21:01 21:15


 


Temp 37.2  37.2 


 


Pulse 149 138 149 


 


Resp 16 20  


 


B/P (MAP) 134/84 (101) 132/87  


 


Pulse Ox 97 95 97 97


 


O2 Delivery Room Air Room Air  Room Air


 


FiO2   21 


 


    





 22





 21:21 21:27 00:30 01:00


 


Temp  36.1 37.7 


 


Pulse 106 98 119 118


 


Resp  18 18 


 


B/P (MAP)  96/61 (73) 102/60 (74) 


 


Pulse Ox  97 98 


 


O2 Delivery  Room Air Room Air 


 


    





 22 





 03:28 07:01 07:50 


 


Temp 37.6  36.9 


 


Pulse 116 118 116 


 


Resp 18  18 


 


B/P (MAP) 94/57 (69)  102/66 (78) 


 


Pulse Ox 96  96 


 


O2 Delivery Room Air  Room Air 





Capillary Refill : Less Than 3 Seconds


General Appearance:  no apparent distress


Eyes:  Bilateral Eye Normal Inspection


HEENT:  pharynx normal


Neck:  supple


Respiratory:  lungs clear


Cardiovascular:  tachycardia (on admission but otherwise regular)


Gastrointestinal:  soft, other (positive fetal heart tones)





Assessment/Plan


Assessment/Plan


Admission Dx


1.  Dehydration


2.  Covid 19


3.  Tachycardia secondary to #1


4.  Diabetes in pregnancy at 28 weeks requiring insulin


Admission Status:  Observation


Reason for Inpatient Admission:  


further IV fluids as well as monitoring of tachycardia


Assessment & Plan


1.  Dehydration


-she has received bolus IV fluids in the ED


-She will be on floor receiving initially 200 cc per hour written through the 

emergency department





2.  Covid 19


-She does not appear to be critical with regards to her symptomatology.  

Therefore no IV viral medications were recommended at this time.





3.  Tachycardia secondary to #1


-Will continue to monitor.    She will have a when necessary metoprolol 25 mg 

for heart rate above 140.





4.  Diabetes in pregnancy at 28 weeks requiring insulin


-Fetal heart tone checked per shift











SANJIV MOJICA MD               Sep 7, 2022 08:13

## 2022-09-07 NOTE — DISCHARGE INST-SIMPLE/STANDARD
Discharge Inst-Standard


Reconcile Patient Problems


Problems Reviewed?:  Yes





Discharge Medications


New, Converted or Re-Newed RX:  Other (no meds called in)





Patient Instructions/Follow Up


Plan of Care/Instructions/FU:  


Dr Mojica Sept 14, 2022


Activity as Tolerated:  Yes


Discharge Diet:  ADA Diet


Return to The Hospital For:  


dizziness, fast heart rate, fever not resolving











SANJIV MOJICA MD               Sep 7, 2022 11:34

## 2022-09-20 ENCOUNTER — HOSPITAL ENCOUNTER (OUTPATIENT)
Dept: HOSPITAL 75 - WSO | Age: 22
End: 2022-09-20
Attending: FAMILY MEDICINE
Payer: MEDICAID

## 2022-09-20 VITALS — DIASTOLIC BLOOD PRESSURE: 63 MMHG | SYSTOLIC BLOOD PRESSURE: 118 MMHG

## 2022-09-20 VITALS — SYSTOLIC BLOOD PRESSURE: 118 MMHG | DIASTOLIC BLOOD PRESSURE: 62 MMHG

## 2022-09-20 VITALS — HEIGHT: 65 IN | BODY MASS INDEX: 36.99 KG/M2 | WEIGHT: 222.01 LBS

## 2022-09-20 VITALS — SYSTOLIC BLOOD PRESSURE: 118 MMHG | DIASTOLIC BLOOD PRESSURE: 63 MMHG

## 2022-09-20 DIAGNOSIS — O26.899: Primary | ICD-10-CM

## 2022-09-20 DIAGNOSIS — Z3A.00: ICD-10-CM

## 2022-09-20 DIAGNOSIS — R10.9: ICD-10-CM

## 2022-09-20 LAB
APTT PPP: YELLOW S
BACTERIA #/AREA URNS HPF: (no result) /HPF
BILIRUB UR QL STRIP: NEGATIVE
FIBRINOGEN PPP-MCNC: CLEAR MG/DL
GLUCOSE UR STRIP-MCNC: NEGATIVE MG/DL
KETONES UR QL STRIP: NEGATIVE
LEUKOCYTE ESTERASE UR QL STRIP: NEGATIVE
NITRITE UR QL STRIP: NEGATIVE
PH UR STRIP: 6 [PH] (ref 5–9)
PROT UR QL STRIP: NEGATIVE
RBC #/AREA URNS HPF: (no result) /HPF
RENAL EPI CELLS #/AREA URNS HPF: (no result) /HPF
SP GR UR STRIP: >=1.03 (ref 1.02–1.02)
SQUAMOUS #/AREA URNS HPF: (no result) /HPF
WBC #/AREA URNS HPF: (no result) /HPF

## 2022-09-20 PROCEDURE — 82947 ASSAY GLUCOSE BLOOD QUANT: CPT

## 2022-09-20 PROCEDURE — 87088 URINE BACTERIA CULTURE: CPT

## 2022-09-20 PROCEDURE — 81000 URINALYSIS NONAUTO W/SCOPE: CPT

## 2022-09-20 PROCEDURE — 99212 OFFICE O/P EST SF 10 MIN: CPT

## 2022-09-21 NOTE — PHYSICIAN QUERY-FINAL DX
LORENAJAN 9/21/22 0750:


Clinic Account Progress/Dx


Physician Query:


Please give diagnosis





Please include # weeks gestation


Date of Service





Sep 20, 2022 at 19:42





DEISI NAYLOR DO 9/22/22 1346:


Clinic Account Progress/Dx


DIAGNOSIS:


Diagnosis


31 week GA


decreased fetal movement (resolved); reassuring fetal heart tones











LORENA,LOC                      Sep 21, 2022 07:50


DEISI NAYLOR DO                Sep 22, 2022 13:46

## 2022-10-08 ENCOUNTER — HOSPITAL ENCOUNTER (OUTPATIENT)
Dept: HOSPITAL 75 - WSO | Age: 22
Discharge: HOME | End: 2022-10-08
Attending: FAMILY MEDICINE
Payer: MEDICAID

## 2022-10-08 VITALS — BODY MASS INDEX: 36 KG/M2 | HEIGHT: 66.02 IN | WEIGHT: 223.99 LBS

## 2022-10-08 VITALS — DIASTOLIC BLOOD PRESSURE: 66 MMHG | SYSTOLIC BLOOD PRESSURE: 113 MMHG

## 2022-10-08 DIAGNOSIS — Z3A.00: ICD-10-CM

## 2022-10-08 DIAGNOSIS — O36.8190: Primary | ICD-10-CM

## 2022-10-08 LAB
APTT PPP: YELLOW S
BACTERIA #/AREA URNS HPF: (no result) /HPF
BILIRUB UR QL STRIP: NEGATIVE
FIBRINOGEN PPP-MCNC: CLEAR MG/DL
GLUCOSE UR STRIP-MCNC: NEGATIVE MG/DL
KETONES UR QL STRIP: NEGATIVE
LEUKOCYTE ESTERASE UR QL STRIP: NEGATIVE
NITRITE UR QL STRIP: NEGATIVE
PH UR STRIP: 7 [PH] (ref 5–9)
PROT UR QL STRIP: NEGATIVE
RBC #/AREA URNS HPF: (no result) /HPF
SP GR UR STRIP: 1.02 (ref 1.02–1.02)
SQUAMOUS #/AREA URNS HPF: (no result) /HPF
WBC #/AREA URNS HPF: (no result) /HPF

## 2022-10-08 PROCEDURE — 99213 OFFICE O/P EST LOW 20 MIN: CPT

## 2022-10-08 PROCEDURE — 81000 URINALYSIS NONAUTO W/SCOPE: CPT

## 2022-10-08 PROCEDURE — 87088 URINE BACTERIA CULTURE: CPT

## 2022-10-14 ENCOUNTER — HOSPITAL ENCOUNTER (OUTPATIENT)
Dept: HOSPITAL 75 - LDRP | Age: 22
End: 2022-10-14
Attending: FAMILY MEDICINE
Payer: MEDICAID

## 2022-10-14 VITALS — BODY MASS INDEX: 52.09 KG/M2 | WEIGHT: 225.09 LBS | HEIGHT: 55 IN

## 2022-10-14 VITALS — DIASTOLIC BLOOD PRESSURE: 73 MMHG | SYSTOLIC BLOOD PRESSURE: 119 MMHG

## 2022-10-14 DIAGNOSIS — Z36.83: Primary | ICD-10-CM

## 2022-10-14 DIAGNOSIS — Z3A.33: ICD-10-CM

## 2022-10-14 PROCEDURE — 59025 FETAL NON-STRESS TEST: CPT

## 2022-10-17 NOTE — PHYSICIAN QUERY-FINAL DX
Clinic Account Progress/Dx


Physician Query:


Please give diagnosis





Please include # weeks gestation


Date of Service





Oct 14, 2022 at 14:39











WHEAT,LOC                      Oct 17, 2022 08:31

## 2022-11-03 ENCOUNTER — HOSPITAL ENCOUNTER (OUTPATIENT)
Dept: HOSPITAL 75 - WSO | Age: 22
End: 2022-11-03
Attending: FAMILY MEDICINE
Payer: MEDICAID

## 2022-11-03 VITALS — SYSTOLIC BLOOD PRESSURE: 90 MMHG | DIASTOLIC BLOOD PRESSURE: 55 MMHG

## 2022-11-03 VITALS — DIASTOLIC BLOOD PRESSURE: 55 MMHG | SYSTOLIC BLOOD PRESSURE: 90 MMHG

## 2022-11-03 DIAGNOSIS — Z3A.00: ICD-10-CM

## 2022-11-03 DIAGNOSIS — O24.913: Primary | ICD-10-CM

## 2022-11-03 PROCEDURE — 59025 FETAL NON-STRESS TEST: CPT

## 2022-11-07 ENCOUNTER — HOSPITAL ENCOUNTER (INPATIENT)
Dept: HOSPITAL 75 - LDRP | Age: 22
LOS: 3 days | Discharge: HOME | End: 2022-11-10
Attending: FAMILY MEDICINE | Admitting: FAMILY MEDICINE
Payer: MEDICAID

## 2022-11-07 VITALS — WEIGHT: 235.23 LBS | BODY MASS INDEX: 38.72 KG/M2 | HEIGHT: 65.35 IN

## 2022-11-07 VITALS — SYSTOLIC BLOOD PRESSURE: 129 MMHG | DIASTOLIC BLOOD PRESSURE: 78 MMHG

## 2022-11-07 VITALS — SYSTOLIC BLOOD PRESSURE: 122 MMHG | DIASTOLIC BLOOD PRESSURE: 73 MMHG

## 2022-11-07 DIAGNOSIS — Z79.82: ICD-10-CM

## 2022-11-07 DIAGNOSIS — Z79.4: ICD-10-CM

## 2022-11-07 DIAGNOSIS — Z3A.37: ICD-10-CM

## 2022-11-07 LAB
APTT PPP: YELLOW S
BACTERIA #/AREA URNS HPF: (no result) /HPF
BASOPHILS # BLD AUTO: 0.1 10^3/UL (ref 0–0.1)
BASOPHILS NFR BLD AUTO: 1 % (ref 0–10)
BILIRUB UR QL STRIP: NEGATIVE
CAOX CRY #/AREA URNS LPF: (no result) /LPF
EOSINOPHIL # BLD AUTO: 0.2 10^3/UL (ref 0–0.3)
EOSINOPHIL NFR BLD AUTO: 2 % (ref 0–10)
FIBRINOGEN PPP-MCNC: CLEAR MG/DL
GLUCOSE UR STRIP-MCNC: NEGATIVE MG/DL
HCT VFR BLD CALC: 33 % (ref 35–52)
HGB BLD-MCNC: 11.2 G/DL (ref 11.5–16)
KETONES UR QL STRIP: (no result)
LEUKOCYTE ESTERASE UR QL STRIP: NEGATIVE
LYMPHOCYTES # BLD AUTO: 3.1 10^3/UL (ref 1–4)
LYMPHOCYTES NFR BLD AUTO: 29 % (ref 12–44)
MANUAL DIFFERENTIAL PERFORMED BLD QL: NO
MCH RBC QN AUTO: 30 PG (ref 25–34)
MCHC RBC AUTO-ENTMCNC: 34 G/DL (ref 32–36)
MCV RBC AUTO: 89 FL (ref 80–99)
MONOCYTES # BLD AUTO: 0.6 10^3/UL (ref 0–1)
MONOCYTES NFR BLD AUTO: 6 % (ref 0–12)
NEUTROPHILS # BLD AUTO: 6.7 10^3/UL (ref 1.8–7.8)
NEUTROPHILS NFR BLD AUTO: 63 % (ref 42–75)
NITRITE UR QL STRIP: NEGATIVE
PH UR STRIP: 5.5 [PH] (ref 5–9)
PLATELET # BLD: 349 10^3/UL (ref 130–400)
PMV BLD AUTO: 9.5 FL (ref 9–12.2)
PROT UR QL STRIP: NEGATIVE
RBC #/AREA URNS HPF: (no result) /HPF
RENAL EPI CELLS #/AREA URNS HPF: (no result) /HPF
SP GR UR STRIP: >=1.03 (ref 1.02–1.02)
SQUAMOUS #/AREA URNS HPF: (no result) /HPF
WBC # BLD AUTO: 10.6 10^3/UL (ref 4.3–11)
WBC #/AREA URNS HPF: (no result) /HPF

## 2022-11-07 PROCEDURE — 86780 TREPONEMA PALLIDUM: CPT

## 2022-11-07 PROCEDURE — 90686 IIV4 VACC NO PRSV 0.5 ML IM: CPT

## 2022-11-07 PROCEDURE — 82947 ASSAY GLUCOSE BLOOD QUANT: CPT

## 2022-11-07 PROCEDURE — 86900 BLOOD TYPING SEROLOGIC ABO: CPT

## 2022-11-07 PROCEDURE — 87088 URINE BACTERIA CULTURE: CPT

## 2022-11-07 PROCEDURE — 85025 COMPLETE CBC W/AUTO DIFF WBC: CPT

## 2022-11-07 PROCEDURE — 81000 URINALYSIS NONAUTO W/SCOPE: CPT

## 2022-11-07 PROCEDURE — 86850 RBC ANTIBODY SCREEN: CPT

## 2022-11-07 PROCEDURE — 86901 BLOOD TYPING SEROLOGIC RH(D): CPT

## 2022-11-07 PROCEDURE — 36415 COLL VENOUS BLD VENIPUNCTURE: CPT

## 2022-11-07 RX ADMIN — SODIUM CHLORIDE SCH MLS/HR: 900 INJECTION, SOLUTION INTRAVENOUS at 21:16

## 2022-11-08 VITALS — SYSTOLIC BLOOD PRESSURE: 104 MMHG | DIASTOLIC BLOOD PRESSURE: 59 MMHG

## 2022-11-08 VITALS — SYSTOLIC BLOOD PRESSURE: 91 MMHG | DIASTOLIC BLOOD PRESSURE: 54 MMHG

## 2022-11-08 VITALS — SYSTOLIC BLOOD PRESSURE: 109 MMHG | DIASTOLIC BLOOD PRESSURE: 55 MMHG

## 2022-11-08 VITALS — SYSTOLIC BLOOD PRESSURE: 116 MMHG | DIASTOLIC BLOOD PRESSURE: 63 MMHG

## 2022-11-08 VITALS — DIASTOLIC BLOOD PRESSURE: 65 MMHG | SYSTOLIC BLOOD PRESSURE: 125 MMHG

## 2022-11-08 VITALS — DIASTOLIC BLOOD PRESSURE: 66 MMHG | SYSTOLIC BLOOD PRESSURE: 149 MMHG

## 2022-11-08 VITALS — DIASTOLIC BLOOD PRESSURE: 60 MMHG | SYSTOLIC BLOOD PRESSURE: 115 MMHG

## 2022-11-08 VITALS — DIASTOLIC BLOOD PRESSURE: 62 MMHG | SYSTOLIC BLOOD PRESSURE: 109 MMHG

## 2022-11-08 VITALS — DIASTOLIC BLOOD PRESSURE: 71 MMHG | SYSTOLIC BLOOD PRESSURE: 127 MMHG

## 2022-11-08 VITALS — DIASTOLIC BLOOD PRESSURE: 69 MMHG | SYSTOLIC BLOOD PRESSURE: 115 MMHG

## 2022-11-08 VITALS — DIASTOLIC BLOOD PRESSURE: 74 MMHG | SYSTOLIC BLOOD PRESSURE: 117 MMHG

## 2022-11-08 VITALS — DIASTOLIC BLOOD PRESSURE: 62 MMHG | SYSTOLIC BLOOD PRESSURE: 103 MMHG

## 2022-11-08 VITALS — DIASTOLIC BLOOD PRESSURE: 60 MMHG | SYSTOLIC BLOOD PRESSURE: 102 MMHG

## 2022-11-08 VITALS — SYSTOLIC BLOOD PRESSURE: 109 MMHG | DIASTOLIC BLOOD PRESSURE: 63 MMHG

## 2022-11-08 VITALS — SYSTOLIC BLOOD PRESSURE: 161 MMHG | DIASTOLIC BLOOD PRESSURE: 124 MMHG

## 2022-11-08 VITALS — SYSTOLIC BLOOD PRESSURE: 118 MMHG | DIASTOLIC BLOOD PRESSURE: 67 MMHG

## 2022-11-08 VITALS — DIASTOLIC BLOOD PRESSURE: 68 MMHG | SYSTOLIC BLOOD PRESSURE: 118 MMHG

## 2022-11-08 VITALS — DIASTOLIC BLOOD PRESSURE: 57 MMHG | SYSTOLIC BLOOD PRESSURE: 110 MMHG

## 2022-11-08 VITALS — SYSTOLIC BLOOD PRESSURE: 126 MMHG | DIASTOLIC BLOOD PRESSURE: 69 MMHG

## 2022-11-08 VITALS — DIASTOLIC BLOOD PRESSURE: 66 MMHG | SYSTOLIC BLOOD PRESSURE: 109 MMHG

## 2022-11-08 VITALS — DIASTOLIC BLOOD PRESSURE: 63 MMHG | SYSTOLIC BLOOD PRESSURE: 110 MMHG

## 2022-11-08 VITALS — DIASTOLIC BLOOD PRESSURE: 62 MMHG | SYSTOLIC BLOOD PRESSURE: 118 MMHG

## 2022-11-08 VITALS — DIASTOLIC BLOOD PRESSURE: 73 MMHG | SYSTOLIC BLOOD PRESSURE: 122 MMHG

## 2022-11-08 VITALS — DIASTOLIC BLOOD PRESSURE: 74 MMHG | SYSTOLIC BLOOD PRESSURE: 125 MMHG

## 2022-11-08 VITALS — SYSTOLIC BLOOD PRESSURE: 115 MMHG | DIASTOLIC BLOOD PRESSURE: 70 MMHG

## 2022-11-08 VITALS — SYSTOLIC BLOOD PRESSURE: 108 MMHG | DIASTOLIC BLOOD PRESSURE: 65 MMHG

## 2022-11-08 VITALS — DIASTOLIC BLOOD PRESSURE: 54 MMHG | SYSTOLIC BLOOD PRESSURE: 93 MMHG

## 2022-11-08 VITALS — DIASTOLIC BLOOD PRESSURE: 76 MMHG | SYSTOLIC BLOOD PRESSURE: 122 MMHG

## 2022-11-08 VITALS — SYSTOLIC BLOOD PRESSURE: 116 MMHG | DIASTOLIC BLOOD PRESSURE: 70 MMHG

## 2022-11-08 VITALS — DIASTOLIC BLOOD PRESSURE: 64 MMHG | SYSTOLIC BLOOD PRESSURE: 120 MMHG

## 2022-11-08 VITALS — DIASTOLIC BLOOD PRESSURE: 68 MMHG | SYSTOLIC BLOOD PRESSURE: 119 MMHG

## 2022-11-08 VITALS — DIASTOLIC BLOOD PRESSURE: 75 MMHG | SYSTOLIC BLOOD PRESSURE: 124 MMHG

## 2022-11-08 VITALS — SYSTOLIC BLOOD PRESSURE: 103 MMHG | DIASTOLIC BLOOD PRESSURE: 58 MMHG

## 2022-11-08 VITALS — SYSTOLIC BLOOD PRESSURE: 107 MMHG | DIASTOLIC BLOOD PRESSURE: 69 MMHG

## 2022-11-08 VITALS — DIASTOLIC BLOOD PRESSURE: 56 MMHG | SYSTOLIC BLOOD PRESSURE: 110 MMHG

## 2022-11-08 VITALS — SYSTOLIC BLOOD PRESSURE: 99 MMHG | DIASTOLIC BLOOD PRESSURE: 54 MMHG

## 2022-11-08 VITALS — DIASTOLIC BLOOD PRESSURE: 108 MMHG | SYSTOLIC BLOOD PRESSURE: 142 MMHG

## 2022-11-08 VITALS — SYSTOLIC BLOOD PRESSURE: 78 MMHG | DIASTOLIC BLOOD PRESSURE: 45 MMHG

## 2022-11-08 VITALS — DIASTOLIC BLOOD PRESSURE: 55 MMHG | SYSTOLIC BLOOD PRESSURE: 106 MMHG

## 2022-11-08 VITALS — SYSTOLIC BLOOD PRESSURE: 106 MMHG | DIASTOLIC BLOOD PRESSURE: 69 MMHG

## 2022-11-08 VITALS — DIASTOLIC BLOOD PRESSURE: 60 MMHG | SYSTOLIC BLOOD PRESSURE: 120 MMHG

## 2022-11-08 VITALS — DIASTOLIC BLOOD PRESSURE: 80 MMHG | SYSTOLIC BLOOD PRESSURE: 141 MMHG

## 2022-11-08 VITALS — SYSTOLIC BLOOD PRESSURE: 120 MMHG | DIASTOLIC BLOOD PRESSURE: 69 MMHG

## 2022-11-08 VITALS — DIASTOLIC BLOOD PRESSURE: 71 MMHG | SYSTOLIC BLOOD PRESSURE: 120 MMHG

## 2022-11-08 VITALS — DIASTOLIC BLOOD PRESSURE: 58 MMHG | SYSTOLIC BLOOD PRESSURE: 132 MMHG

## 2022-11-08 VITALS — SYSTOLIC BLOOD PRESSURE: 115 MMHG | DIASTOLIC BLOOD PRESSURE: 63 MMHG

## 2022-11-08 VITALS — DIASTOLIC BLOOD PRESSURE: 62 MMHG | SYSTOLIC BLOOD PRESSURE: 105 MMHG

## 2022-11-08 VITALS — DIASTOLIC BLOOD PRESSURE: 68 MMHG | SYSTOLIC BLOOD PRESSURE: 109 MMHG

## 2022-11-08 VITALS — SYSTOLIC BLOOD PRESSURE: 112 MMHG | DIASTOLIC BLOOD PRESSURE: 66 MMHG

## 2022-11-08 VITALS — SYSTOLIC BLOOD PRESSURE: 114 MMHG | DIASTOLIC BLOOD PRESSURE: 91 MMHG

## 2022-11-08 VITALS — SYSTOLIC BLOOD PRESSURE: 115 MMHG | DIASTOLIC BLOOD PRESSURE: 56 MMHG

## 2022-11-08 VITALS — DIASTOLIC BLOOD PRESSURE: 68 MMHG | SYSTOLIC BLOOD PRESSURE: 111 MMHG

## 2022-11-08 VITALS — SYSTOLIC BLOOD PRESSURE: 122 MMHG | DIASTOLIC BLOOD PRESSURE: 68 MMHG

## 2022-11-08 VITALS — SYSTOLIC BLOOD PRESSURE: 113 MMHG | DIASTOLIC BLOOD PRESSURE: 56 MMHG

## 2022-11-08 VITALS — DIASTOLIC BLOOD PRESSURE: 75 MMHG | SYSTOLIC BLOOD PRESSURE: 129 MMHG

## 2022-11-08 VITALS — SYSTOLIC BLOOD PRESSURE: 121 MMHG | DIASTOLIC BLOOD PRESSURE: 73 MMHG

## 2022-11-08 VITALS — SYSTOLIC BLOOD PRESSURE: 111 MMHG | DIASTOLIC BLOOD PRESSURE: 58 MMHG

## 2022-11-08 VITALS — DIASTOLIC BLOOD PRESSURE: 84 MMHG | SYSTOLIC BLOOD PRESSURE: 148 MMHG

## 2022-11-08 VITALS — DIASTOLIC BLOOD PRESSURE: 85 MMHG | SYSTOLIC BLOOD PRESSURE: 152 MMHG

## 2022-11-08 VITALS — DIASTOLIC BLOOD PRESSURE: 76 MMHG | SYSTOLIC BLOOD PRESSURE: 129 MMHG

## 2022-11-08 VITALS — DIASTOLIC BLOOD PRESSURE: 61 MMHG | SYSTOLIC BLOOD PRESSURE: 110 MMHG

## 2022-11-08 VITALS — SYSTOLIC BLOOD PRESSURE: 117 MMHG | DIASTOLIC BLOOD PRESSURE: 70 MMHG

## 2022-11-08 VITALS — SYSTOLIC BLOOD PRESSURE: 130 MMHG | DIASTOLIC BLOOD PRESSURE: 69 MMHG

## 2022-11-08 VITALS — SYSTOLIC BLOOD PRESSURE: 115 MMHG | DIASTOLIC BLOOD PRESSURE: 74 MMHG

## 2022-11-08 PROCEDURE — 0UQMXZZ REPAIR VULVA, EXTERNAL APPROACH: ICD-10-PCS | Performed by: FAMILY MEDICINE

## 2022-11-08 PROCEDURE — 10907ZC DRAINAGE OF AMNIOTIC FLUID, THERAPEUTIC FROM PRODUCTS OF CONCEPTION, VIA NATURAL OR ARTIFICIAL OPENING: ICD-10-PCS | Performed by: FAMILY MEDICINE

## 2022-11-08 RX ADMIN — SODIUM CHLORIDE SCH MLS/HR: 900 INJECTION, SOLUTION INTRAVENOUS at 20:31

## 2022-11-08 RX ADMIN — BUTORPHANOL TARTRATE PRN MG: 2 INJECTION, SOLUTION INTRAMUSCULAR; INTRAVENOUS at 05:00

## 2022-11-08 RX ADMIN — SODIUM CHLORIDE SCH MLS/HR: 900 INJECTION, SOLUTION INTRAVENOUS at 04:21

## 2022-11-08 RX ADMIN — BUTORPHANOL TARTRATE PRN MG: 2 INJECTION, SOLUTION INTRAMUSCULAR; INTRAVENOUS at 02:04

## 2022-11-08 RX ADMIN — SODIUM CHLORIDE SCH MLS/HR: 900 INJECTION, SOLUTION INTRAVENOUS at 12:23

## 2022-11-08 NOTE — OB LABOR & DELIVERY RECORD
L&D History


Date of Service


Date of Service:  2022





History


Expected Date of Delivery:  2022


Gestational Age in Weeks:  37


Hx :  1


Hx Para:  1





Pregnancy Complications


Prenatal Events:  Gestational Diabetes (with insulin depent)


Operative Indications (Cesarea:  N/A-Vaginal Delivery


Intrapartal Events:  None





L&D Stage1


Stage One


Onset of Labor - Date:  2022


Onset of Labor - Time:  06:30





Monitors and Tracing


Fetal Monitor Mode:  Internal


Fetal Heart Rate:  135


Fetal Monitor Accelerations:  Uniform


Fetal Monitor Decelerations:  Early


Fetal Station:  -1


Long Term Variability:  Average (6-10)


Short Term Variability:  Present


Fetal Presentation:  Vertex


Vital Signs





                          VS - Last 72 Hours, by Label








 22





 19:22 23:42 04:30 08:00


 


Temp 35.9 36.3 36.1 36.2


 


Pulse 123 104 101 114


 


Resp 20 20 18 18


 


B/P (MAP)  122/73 (89) 129/76 (93) 120/64 (82)


 


Pulse Ox 98 99 99 97


 


O2 Delivery Room Air Room Air Room Air Room Air


 


    





 22





 08:16 08:32 08:45 09:00


 


Pulse 78 101 101 112


 


Resp 18 18 18 18


 


B/P (MAP) 112/66 (81) 115/56 (75) 113/56 (75) 116/63 (80)


 


Pulse Ox 98 97 97 97


 


O2 Delivery Room Air Room Air Room Air Room Air





 22





 09:15 09:20 09:25 09:30


 


Pulse 131 118 123 131


 


Resp 18 18 18 18


 


B/P (MAP) 141/80 (100) 152/85 (107) 148/84 (105) 141/80 (100)


 


Pulse Ox 97 98 98 97


 


O2 Delivery Room Air Room Air Room Air Room Air





 22





 09:35 09:40 09:45 09:50


 


Temp    36.3


 


Pulse 114 106 95 98


 


Resp 18 18 18 18


 


B/P (MAP) 132/58 (82) 120/60 (80) 126/69 (88) 129/75 (93)


 


Pulse Ox 97 97 97 97


 


O2 Delivery Room Air Room Air Room Air Room Air


 


    





 22





 09:55 10:00 10:05 10:10


 


Pulse 96 98 104 88


 


Resp 18 18 18 18


 


B/P (MAP) 117/70 (86) 121/73 (89) 122/76 (91) 125/74 (91)


 


Pulse Ox 97 97 99 99


 


O2 Delivery Room Air Room Air Room Air Room Air





 22





 10:15 10:20 10:30 10:45


 


Pulse 89 98 92 98


 


Resp 18 18 18 18


 


B/P (MAP) 130/69 (89) 120/69 (86) 124/75 (91) 119/68 (85)


 


Pulse Ox 99 98 99 99


 


O2 Delivery Room Air Room Air Room Air Room Air





 22





 11:00 11:15 11:35 11:55


 


Temp  36.3  


 


Pulse 87 100 89 94


 


Resp 18 18 18 18


 


B/P (MAP) 122/73 (89) 115/70 (85) 118/62 (80) 115/74 (88)


 


Pulse Ox 98 98 98 99


 


O2 Delivery Room Air Room Air Room Air Room Air


 


    





 22





 12:07 12:20 12:38 12:52


 


Pulse 90 94 91 89


 


Resp 18 18 18 18


 


B/P (MAP) 117/74 (88) 115/69 (84) 115/69 (84) 109/68 (82)


 


Pulse Ox 98 98 98 99


 


O2 Delivery Room Air Room Air Room Air Room Air





 22





 13:10 13:20 13:52 14:06


 


Pulse 85 96 88 88


 


Resp 18 18 18 18


 


B/P (MAP) 103/62 (76) 102/60 (74) 115/69 (84) 111/58 (75)


 


Pulse Ox 99 98 97 97


 


O2 Delivery Room Air Room Air Room Air Room Air





 22





 14:22 14:35 14:50 15:05


 


Temp  36.6  


 


Pulse 90 86 93 97


 


Resp 18 18 18 18


 


B/P (MAP) 109/66 (80) 109/62 (78) 116/70 (85) 111/68 (82)


 


Pulse Ox 97 99 96 97


 


O2 Delivery Room Air Room Air Room Air Room Air


 


    





 22





 15:20 15:36 15:51 16:05


 


Temp   36.8 


 


Pulse 103 112 107 107


 


Resp 18 18 18 18


 


B/P (MAP) 107/69 (82) 110/63 (79) 109/63 (78) 110/56 (74)


 


Pulse Ox 98 97 97 98


 


O2 Delivery Room Air Room Air Room Air Room Air


 


    





 22





 16:20 16:35 16:52 17:00


 


Pulse 91 96 89 83


 


Resp 18 18 18 18


 


B/P (MAP) 93/54 (67) 99/54 (69) 78/45 (56) 115/63 (80)


 


Pulse Ox 97 97 97 100


 


O2 Delivery Room Air Room Air Room Air Non Rebreather


 


O2 Flow Rate    15.00





 22





 17:07 17:23 17:35 17:53


 


Pulse 83 96 86 110


 


Resp 18 18 18 18


 


B/P (MAP) 91/54 (66) 114/91 (99) 103/58 (73) 120/71 (87)


 


Pulse Ox 100 97 98 97


 


O2 Delivery Non Rebreather Room Air Room Air Room Air


 


O2 Flow Rate 15.00   





 22





 18:08 18:22 18:38 18:54


 


Temp  36.8  


 


Pulse 86 108 109 102


 


Resp 18 18 18 18


 


B/P (MAP) 118/68 (85) 127/71 (89) 149/66 (93) 118/67 (84)


 


Pulse Ox 98 98 98 98


 


O2 Delivery Room Air Room Air Room Air Room Air


 


    





 22





 19:06 19:21 19:36 19:53


 


Temp   37.0 


 


Pulse 105 97 99 102


 


Resp 20 20 18 


 


B/P (MAP) 122/68 (86) 110/61 (77) 110/57 (74) 142/108 (119)


 


Pulse Ox 98 98 97 


 


O2 Delivery Room Air Room Air Room Air 


 


    





 22  





 20:07 21:35  


 


Pulse 105 105  


 


Resp 18 20  


 


B/P (MAP) 161/124 (136) 109/55 (73)  


 


Pulse Ox 97   


 


O2 Delivery Room Air Room Air  











Signs of Fetal Distress by FHT


Signs of Distress


no





Rupture of Membranes


Spontaneous Ruture of Membrane:  No


Amniotic Membrane Rupture Time:  0632


Amniotic Membrane Fluid Desc.:  Clear


Vaginal Bleeding Description:  None





Induction/Anesthesia


Epidural Cath Placement - Time:  930





L&D Stage2


Stage Two


Stage II Date:  2022


Stage II Time:  21:01





Monitors and Tracing


Fetal Monitor Mode:  Internal


Fetal Heart Rate:  135


Fetal Monitor Accelerations:  Uniform


Fetal Monitor Decelerations:  Early


Long Term Variability:  Average (6-10)


Short Term Variability:  Present


Fetal Position:  Left Occiput Anterior


Fetal Presentation:  Vertex





Signs of Fetal Distress by FHT


Signs of Distress


no





Cord Descript/Complications


Fetal Cord Vessel Description:  3 Vessels





Delivery Type


Infant Delivery Method:  Spontaneous Vaginal


Anterior Shoulder:  Left





Episiotomy/Perineal Laceration


Laceraction(s)/Extensions:  No


Episiotomy Description:  None


Sutures Used:  Vicryl


Degree (describe repair)


1 stitch R labial minora at 9 oclock





Condition of Infant


Delivery


1 minute Apgar Comment:  


7


5 minute Apgar Comment:  


9





Condition of Infant


Condition of Infant:  Living


Exam:  No Observed Abnormalities





Resuscitation


Resuscitation:  N/A - Spontaneous Resp





L&D Stage3


Stage Three


Stage III Date:  2022


Stage III Time:  21:07





Pictocin


Pitocin Administration mu/min:  22


Pitocin ml/hr:  22





Placenta Delivery


Placenta Delivery:  Spontaneous





Delivery Summary


Summary


Estimated blood loss (mL):  200





Condition of Delivery


Examined:  Cervix Examined


Post Partum Hemorrhage:  No


Intervention Required


none











SANJIV MOJICA MD               2022 23:02

## 2022-11-08 NOTE — HISTORY & PHYSICAL-OB
OB - Chief Complaint & HPI


Date/Time


Date of Admission:


Date of Admission:  2022 at 19:05


Date seen by a Provider:  2022


Time Seen by a Provider:  06:30





Chief Complaint/History


OB-Reason for Admission/Chief:  Induction of Labor


Hx :  1


Hx Para:  0


Expected Date of Delivery:  2022


Gestational Age in Weeks:  37


Gestational Age in Days:  1


Indication for induction:  other (Insulin dependent diabetic)


Admission Nurse Assessment Rev:  Yes


History of Labs


GBS negative





Allergies and Home Medications


Allergies


Coded Allergies:  


     ceftriaxone (Verified  Allergy, Mild, HIVES, 22)





Patient Home Medication List


Home Medication List Reviewed:  Yes


Ferrous Sulfate (Iron) 325 Mg (65 Mg Iron) Tablet, 325 MG PO DAILY, (Reported)


   Entered as Reported by: KODY NOVOA on 22


   Last Action: Continued


Insulin NPH Human Isophane (Humulin N) 100 Unit/Ml Vial, 44 UNITS SC DAILY, 

(Reported)


   Entered as Reported by: KODY NOVOA on 22


   Last Action: Held


Insulin NPH Human Isophane (Humulin N) 100 Unit/Ml Vial, 34 UNIT SQ HS, 

(Reported)


   Entered as Reported by: KODY NOVOA on 22


   Last Action: Held


Metformin HCl (Metformin HCl) 500 Mg Tablet, 1,000 MG PO BID, (Reported)


   Entered as Reported by: KODY NOVOA on 22


   Last Action: Continued


Pnv No.121/Iron/Folic Acid (Prenatal Multivitamin Tablet) 28 Mg Iron-800 Mcg 

Tablet, 1 EACH PO DAILY, (Reported)


   Entered as Reported by: KODY NOVOA on 22


   Last Action: Reviewed





OB - History


Hx of Present Pregnancy


Prenatal Care:  Yes


Ultrasounds:  Normal mid trimester US


Obstetrical Complications:  Gestational Diabetes (on insulin)


Medical Complications:  Other (Diabetes)





Delivery History


Hx Blood Disorders:  No





Patient Past Medical History





Diabetes in pregnancy





Social History/Family History


2nd Hand Smoke Exposure:  Yes





Immunizations


Influenza Vaccine Up-to-Date:  No; Not Current


Hepatitis A:  Yes


Hepatitis B:  Yes


Tetanus Booster (TDap):  Less than 5yrs


Date of Pneumonia Vaccine:  2013





OB - Admission Exam


Physical Exam


Vitals:





Vital Signs








 22





 04:30


 


Temp 36.1


 


Pulse 101


 


Resp 18


 


B/P (MAP) 129/76 (93)


 


Pulse Ox 99


 


O2 Delivery Room Air








HEENT:  Moist Membranes


Heart:  Rhythm Normal


Lungs:  Clear


Abdomen:  Gravid


Extremities:  Normal


Reflexes:  Normal


Cervical Dilatation:  1cm


Effacement:  50%


Station:  -3


Membranes:  Intact


Fetal Heart Rate:  140's


Accelerations:  Accelerations Present


Short Term Variability:  Present


Long Term Variability:  Average (6-25)


Contractions on Admission:  >10 Minutes Apart (on admit)


Intensity:  Mild





Carias Scoring Tool (Modified)


Dilation (cm):  1-2cm (1)


Effacement (%):  31-51%  (1)


Fetal Descent/Station:  -3        (0)


Cervix Consistency:  Medium(1)


Cervix Position:  Posterior  (0)


Subtract 1 point for:  Nulliparity (-1)


Carias Score:  2


Labs





Laboratory Tests








Test


 22


19:45 22


06:50 Range/Units


 


 


White Blood Count


 10.6 


 


 4.3-11.0


10^3/uL


 


Red Blood Count


 3.70 L


 


 3.80-5.11


10^6/uL


 


Hemoglobin 11.2 L  11.5-16.0  g/dL


 


Hematocrit 33 L  35-52  %


 


Mean Corpuscular Volume 89   80-99  fL


 


Mean Corpuscular Hemoglobin 30   25-34  pg


 


Mean Corpuscular Hemoglobin


Concent 34 


 


 32-36  g/dL





 


Red Cell Distribution Width 13.0   10.0-14.5  %


 


Platelet Count


 349 


 


 130-400


10^3/uL


 


Mean Platelet Volume 9.5   9.0-12.2  fL


 


Immature Granulocyte % (Auto) 0    %


 


Neutrophils (%) (Auto) 63   42-75  %


 


Lymphocytes (%) (Auto) 29   12-44  %


 


Monocytes (%) (Auto) 6   0-12  %


 


Eosinophils (%) (Auto) 2   0-10  %


 


Basophils (%) (Auto) 1   0-10  %


 


Neutrophils # (Auto)


 6.7 


 


 1.8-7.8


10^3/uL


 


Lymphocytes # (Auto)


 3.1 


 


 1.0-4.0


10^3/uL


 


Monocytes # (Auto)


 0.6 


 


 0.0-1.0


10^3/uL


 


Eosinophils # (Auto)


 0.2 


 


 0.0-0.3


10^3/uL


 


Basophils # (Auto)


 0.1 


 


 0.0-0.1


10^3/uL


 


Immature Granulocyte # (Auto)


 0.0 


 


 0.0-0.1


10^3/uL


 


Urine Color YELLOW    


 


Urine Clarity CLEAR    


 


Urine pH 5.5   5-9  


 


Urine Specific Gravity >=1.030   1.016-1.022  


 


Urine Protein NEGATIVE   NEGATIVE  


 


Urine Glucose (UA) NEGATIVE   NEGATIVE  


 


Urine Ketones TRACE H  NEGATIVE  


 


Urine Nitrite NEGATIVE   NEGATIVE  


 


Urine Bilirubin NEGATIVE   NEGATIVE  


 


Urine Urobilinogen 1.0   < = 1.0  MG/DL


 


Urine Leukocyte Esterase NEGATIVE   NEGATIVE  


 


Urine RBC (Auto) NEGATIVE   NEGATIVE  


 


Urine RBC NONE    /HPF


 


Urine WBC 2-5    /HPF


 


Urine Squamous Epithelial


Cells NONE 


 


  /HPF





 


Urine Renal Epithelial Cells NONE    /HPF


 


Urine Crystals PRESENT H   /LPF


 


Urine Calcium Oxalate Crystals LARGE H   /LPF


 


Urine Bacteria MODERATE H   /HPF


 


Urine Casts NONE    /LPF


 


Urine Mucus NEGATIVE    /LPF


 


Urine Culture Indicated YES    


 


Glucose Level 134 H    MG/DL


 


Glucometer  58 *L   MG/DL











OB - Assessment/Plan/Diagnosis


Assessment


Assessment:  induction of labor (due to insulin dependent diabetic at term 37 

weeks)


Admission Dx


1.  IUP at 37 weeks with insulin dependent diabetes


Admission Status:  Inpatient Order (span 2 midnights)


Reason for Inpatient Admission:  


Induction of labor





Plan


Plan:  Induction


Induction Method:  per Misoprostol Protocol


Other Plan


-Monitor glucose q4 hr while in labor


-induction by cytotec and AROM in am of 











SANJIV MOJICA MD               2022 07:55

## 2022-11-09 VITALS — DIASTOLIC BLOOD PRESSURE: 51 MMHG | SYSTOLIC BLOOD PRESSURE: 94 MMHG

## 2022-11-09 VITALS — SYSTOLIC BLOOD PRESSURE: 106 MMHG | DIASTOLIC BLOOD PRESSURE: 62 MMHG

## 2022-11-09 VITALS — DIASTOLIC BLOOD PRESSURE: 54 MMHG | SYSTOLIC BLOOD PRESSURE: 92 MMHG

## 2022-11-09 VITALS — SYSTOLIC BLOOD PRESSURE: 110 MMHG | DIASTOLIC BLOOD PRESSURE: 64 MMHG

## 2022-11-09 VITALS — DIASTOLIC BLOOD PRESSURE: 62 MMHG | SYSTOLIC BLOOD PRESSURE: 119 MMHG

## 2022-11-09 LAB
BASOPHILS # BLD AUTO: 0.1 10^3/UL (ref 0–0.1)
BASOPHILS NFR BLD AUTO: 0 % (ref 0–10)
EOSINOPHIL # BLD AUTO: 0.1 10^3/UL (ref 0–0.3)
EOSINOPHIL NFR BLD AUTO: 1 % (ref 0–10)
HCT VFR BLD CALC: 30 % (ref 35–52)
HGB BLD-MCNC: 10 G/DL (ref 11.5–16)
LYMPHOCYTES # BLD AUTO: 2.1 10^3/UL (ref 1–4)
LYMPHOCYTES NFR BLD AUTO: 16 % (ref 12–44)
MANUAL DIFFERENTIAL PERFORMED BLD QL: NO
MCH RBC QN AUTO: 30 PG (ref 25–34)
MCHC RBC AUTO-ENTMCNC: 34 G/DL (ref 32–36)
MCV RBC AUTO: 90 FL (ref 80–99)
MONOCYTES # BLD AUTO: 0.8 10^3/UL (ref 0–1)
MONOCYTES NFR BLD AUTO: 6 % (ref 0–12)
NEUTROPHILS # BLD AUTO: 10.3 10^3/UL (ref 1.8–7.8)
NEUTROPHILS NFR BLD AUTO: 77 % (ref 42–75)
PLATELET # BLD: 299 10^3/UL (ref 130–400)
PMV BLD AUTO: 9.6 FL (ref 9–12.2)
WBC # BLD AUTO: 13.4 10^3/UL (ref 4.3–11)

## 2022-11-09 RX ADMIN — ACETAMINOPHEN SCH MG: 500 TABLET ORAL at 07:32

## 2022-11-09 RX ADMIN — ACETAMINOPHEN SCH MG: 500 TABLET ORAL at 12:51

## 2022-11-09 RX ADMIN — METFORMIN HYDROCHLORIDE SCH MG: 500 TABLET, FILM COATED ORAL at 09:17

## 2022-11-09 RX ADMIN — DOCUSATE SODIUM SCH MG: 100 CAPSULE ORAL at 09:17

## 2022-11-09 RX ADMIN — VITAMIN A ACETATE, .BETA.-CAROTENE, ASCORBIC ACID, CHOLECALCIFEROL, .ALPHA.-TOCOPHEROL ACETATE, DL-, THIAMINE MONONITRATE, RIBOFLAVIN, NIACINAMIDE, PYRIDOXINE HYDROCHLORIDE, FOLIC ACID, CYANOCOBALAMIN, CALCIUM CARBONATE, FERROUS FUMARATE, ZINC OXIDE, AND CUPRIC OXIDE SCH EA: 2000; 2000; 120; 400; 22; 1.84; 3; 20; 10; 1; 12; 200; 27; 25; 2 TABLET ORAL at 09:17

## 2022-11-09 RX ADMIN — ACETAMINOPHEN SCH MG: 500 TABLET ORAL at 18:39

## 2022-11-09 RX ADMIN — FERROUS SULFATE TAB 325 MG (65 MG ELEMENTAL FE) SCH MG: 325 (65 FE) TAB at 09:17

## 2022-11-09 RX ADMIN — IBUPROFEN SCH MG: 600 TABLET ORAL at 01:10

## 2022-11-09 RX ADMIN — IBUPROFEN SCH MG: 600 TABLET ORAL at 18:39

## 2022-11-09 RX ADMIN — DOCUSATE SODIUM SCH MG: 100 CAPSULE ORAL at 20:03

## 2022-11-09 RX ADMIN — BENZOCAINE AND LEVOMENTHOL PRN EA: 200; 5 SPRAY TOPICAL at 01:10

## 2022-11-09 RX ADMIN — WITCH HAZEL PRN EA: 500 SOLUTION RECTAL; TOPICAL at 01:09

## 2022-11-09 RX ADMIN — IBUPROFEN SCH MG: 600 TABLET ORAL at 07:32

## 2022-11-09 RX ADMIN — METFORMIN HYDROCHLORIDE SCH MG: 500 TABLET, FILM COATED ORAL at 18:40

## 2022-11-09 RX ADMIN — IBUPROFEN SCH MG: 600 TABLET ORAL at 12:50

## 2022-11-09 RX ADMIN — ACETAMINOPHEN SCH MG: 500 TABLET ORAL at 01:10

## 2022-11-09 NOTE — ANESTHESIA-REGIONAL POST-OP
Regional


Patient Condition


Mental Status:  Alert, Oriented x3


Circulation:  Same as Pre-Op


Headache:  Absent


Sensation:  Full Recovery


Motor Block:  Absent





Post Op Complications


Complications


None





Follow Up Care/Instructions


Patient Instructions


None needed.





Anesthesia/Patient Condition


Patient is doing well, no complaints, stable vital signs, no apparent adverse 

anesthesia problems.   


No complications reported per nursing.











SCARLET LA DO          Nov 9, 2022 15:16

## 2022-11-09 NOTE — PROGRESS NOTE
Subjective


Subjective/Events-last exam


No complaints.  Body a little sore from delivery but no significant vaginal 

bleed.  Cramping some.  Appetite fair.





Objective


Exam


Last Set of Vital Signs





Vital Signs








  Date Time  Temp Pulse Resp B/P (MAP) Pulse Ox O2 Delivery O2 Flow Rate FiO2


 


22 05:05 36.0 79 18 92/54 (67) 97 Room Air  


 


22 17:07       15.00 





Capillary Refill : Less Than 3 Seconds


I&O











Intake and Output 


 


 22





 00:00


 


Intake Total 4895 ml


 


Balance 4895 ml


 


 


 


Intake IV Total 4895 ml


 


Blood Loss Quantification Method 200 ml








General:  No Acute Distress


Neck:  Supple


Lungs:  Clear to Auscultation


Heart:  Regular Rate


Abdomen:  Soft (with uterus firm)





Results/Procedures


Lab


Laboratory Tests


22 08:32: Glucometer 69L


22 12:03: Glucometer 65L


22 18:00: Glucometer 61L


22 05:22: 


White Blood Count 13.4H, Red Blood Count 3.32L, Hemoglobin 10.0L, Hematocrit 30L

, Mean Corpuscular Volume 90, Mean Corpuscular Hemoglobin 30, Mean Corpuscular 

Hemoglobin Concent 34, Red Cell Distribution Width 13.2, Platelet Count 299, 

Mean Platelet Volume 9.6, Immature Granulocyte % (Auto) 0, Neutrophils (%) 

(Auto) 77H, Lymphocytes (%) (Auto) 16, Monocytes (%) (Auto) 6, Eosinophils (%) 

(Auto) 1, Basophils (%) (Auto) 0, Neutrophils # (Auto) 10.3H, Lymphocytes # 

(Auto) 2.1, Monocytes # (Auto) 0.8, Eosinophils # (Auto) 0.1, Basophils # (Auto)

0.1, Immature Granulocyte # (Auto) 0.1, Glucose Level 80





Microbiology


22 Urine Culture - Preliminary, Resulted


          Culture In Progress





Assessment/Plan


Assessment/Plan


Admission Dx


1.  IUP at term 37wk now delivered 


2.  Insulin dependent diabetic during pregnancy


Admission Status:  Inpatient Order (span 2 midnights)


Assessment & Plan


1.  IUP at term 37wk now delivered 


-routine PP care orders


-home in am of 11/10





2.  Insulin dependent diabetic during pregnancy


-glucose monitored


-plan on restarting her Jardiance most likely tomorrow











SANJIV MOJICA MD               2022 07:39

## 2022-11-10 VITALS — DIASTOLIC BLOOD PRESSURE: 75 MMHG | SYSTOLIC BLOOD PRESSURE: 126 MMHG

## 2022-11-10 VITALS — DIASTOLIC BLOOD PRESSURE: 50 MMHG | SYSTOLIC BLOOD PRESSURE: 92 MMHG

## 2022-11-10 RX ADMIN — IBUPROFEN SCH MG: 600 TABLET ORAL at 10:42

## 2022-11-10 RX ADMIN — DOCUSATE SODIUM SCH MG: 100 CAPSULE ORAL at 10:39

## 2022-11-10 RX ADMIN — WITCH HAZEL PRN EA: 500 SOLUTION RECTAL; TOPICAL at 10:52

## 2022-11-10 RX ADMIN — ACETAMINOPHEN SCH MG: 500 TABLET ORAL at 03:53

## 2022-11-10 RX ADMIN — IBUPROFEN SCH MG: 600 TABLET ORAL at 03:52

## 2022-11-10 RX ADMIN — METFORMIN HYDROCHLORIDE SCH MG: 500 TABLET, FILM COATED ORAL at 10:39

## 2022-11-10 RX ADMIN — FERROUS SULFATE TAB 325 MG (65 MG ELEMENTAL FE) SCH MG: 325 (65 FE) TAB at 10:39

## 2022-11-10 RX ADMIN — ACETAMINOPHEN SCH MG: 500 TABLET ORAL at 03:50

## 2022-11-10 RX ADMIN — ACETAMINOPHEN SCH MG: 500 TABLET ORAL at 10:42

## 2022-11-10 RX ADMIN — IBUPROFEN SCH MG: 600 TABLET ORAL at 03:50

## 2022-11-10 RX ADMIN — BENZOCAINE AND LEVOMENTHOL PRN EA: 200; 5 SPRAY TOPICAL at 10:53

## 2022-11-10 RX ADMIN — VITAMIN A ACETATE, .BETA.-CAROTENE, ASCORBIC ACID, CHOLECALCIFEROL, .ALPHA.-TOCOPHEROL ACETATE, DL-, THIAMINE MONONITRATE, RIBOFLAVIN, NIACINAMIDE, PYRIDOXINE HYDROCHLORIDE, FOLIC ACID, CYANOCOBALAMIN, CALCIUM CARBONATE, FERROUS FUMARATE, ZINC OXIDE, AND CUPRIC OXIDE SCH EA: 2000; 2000; 120; 400; 22; 1.84; 3; 20; 10; 1; 12; 200; 27; 25; 2 TABLET ORAL at 10:39

## 2022-11-10 NOTE — DISCHARGE INST-WOMEN'S SERVICE
Discharge Inst-Women's Serv


Depart Medication/Instructions


New, Converted or Re-Newed RX:  Transmitted to Pharmacy ([facility])


Problems Reviewed?:  Yes





Consults/Follow Up


Additional Follow Up:  Yes (Dr Mojica in 6 weeks)





Activity


Driving Instructions:  No Driving for 1 Week


Nothing Inside Vagina:  No [place] (for 6 weeks)





Diet


Discharge Diet:  ADA Diet


Return to The Hospital For:  


as below


Symptoms to Report to :  Bleeding Excessive, Fever Over 101 Degrees F, 

Vaginal Discharge Foul


For Any Problems or Questions:  Contact Your Physician











SANJIV MOJICA MD              Nov 10, 2022 07:57

## 2022-11-10 NOTE — DISCHARGE SUMMARY
Diagnosis/Chief Complaint


Date of Admission


2022 at 19:05


Date of Discharge


November 10, 2022


Admission Diagnosis


Admission Diagnosis


1.  Intrauterine pregnancy at 37 weeks gestation


2.  Maternal diabetes on insulin





Discharge Diagnosis


1.  Intrauterine pregnancy at 37 weeks gestation


2.  Maternal diabetes on insulin





Chief Complaint/HPI


Chief Complaint/HPI


22-year-old  1 now term 1 who presented to women's services during the 

evening of  for induction of labor.  She has been followed by myself 

as well as  through [facility] in [facility].  She was followed by 

perinatology due to history of diabetes prior to pregnancy.  She was started on 

insulin by the perinatologist and followed pretty much weekly in the third 

trimester with ultrasound and insulin adjustments.  She was noted to have 

decreased fetal movement but had reassuring strip and biophysical profile the 

month prior to delivery.  She was induced due to her complications as stated.  

She was at 37 weeks gestation upon induction.  Her EDC is 2022 GBS 

status at 36 weeks was noted to be negative.





Discharge Summary-OBS


Procedures


1.  Epidural per anesthesia


2.  Spontaneous vaginal delivery


3.  Repair of very minor introital tear at the 9 o'clock position


Discharge Physical Examination


Allergies:  


Coded Allergies:  


     ceftriaxone (Verified  Allergy, Mild, HIVES, 22)


Vitals & I&Os





Vital Sign - Last 12Hours








  Date Time  Temp Pulse Resp B/P (MAP) Pulse Ox O2 Delivery O2 Flow Rate FiO2


 


11/10/22 03:53 36.3 80 18 92/50 (64) 98 Room Air  


 


22 17:07       15.00 








General Appearance:  No Acute Distress


HEENT:  Atraumatic


Respiratory:  Clear to Auscultation


Cardiovascular:  Regular Rate


Abdominal:  Normal Bowel Sounds, Soft (with uterus firm)


Neuro:  Normal Gait, Normal Speech





Hospital Course


Was the Problem List Reviewed?:  Yes


patient was admitted during the evening of 2022 for induction of 

labor.  She underwent Cytotec cervical ripening.  She tolerated well and was not

ed to have a contraction pattern by the early morning of 2022.  She 

underwent amniotomy with placement of fetal scalp electrode.  She continued to 

contract requiring Pitocin augmentation.  Regarding her diabetes she had normal 

saline used as fluids and her glucose was checked every 4 hours and noted to be 

under 100.  She ultimately went on to deliver a term viable female at 2101 on 

2022.  Infant received Apgars of 7 at 1 minute and 9 at 5 minutes.





Following delivery she underwent routine postpartum care orders.  She was not 

given any insulin during the course of her stay and her glucose values were 

averaging below 100.  She did not have any fever, excessive vaginal bleeding, or

excessive uterine cramping during the course of her stay.  She did complain of 

some minor leg pain but this was felt due to her extended pushing and thighs 

flexed.  Her hemoglobin the morning of  was 10.0 compared to admission

of 11.2.  She tolerated regular diabetic diet and was felt ready for dismissal 

to home in the morning of November 10, 2022.  She will follow-up with myself in 

6 weeks at [facility].


Labs





Microbiology


22 Urine Culture - Final, Complete


          Gram Pos Mixed Bacterial Ahslee





Discharge


Instructions to patient/family


Please see electronic discharge instructions given to patient.


Discharge Medications


Reviewed and agree with Discharge Medication list on patient's Discharge 

Instruction sheet











SANJIV MOJICA MD              Nov 10, 2022 08:04

## 2022-11-16 ENCOUNTER — HOSPITAL ENCOUNTER (EMERGENCY)
Dept: HOSPITAL 75 - ER | Age: 22
Discharge: HOME | End: 2022-11-16
Payer: MEDICAID

## 2022-11-16 VITALS — HEIGHT: 65 IN | BODY MASS INDEX: 35 KG/M2 | WEIGHT: 210.1 LBS

## 2022-11-16 VITALS — SYSTOLIC BLOOD PRESSURE: 107 MMHG | DIASTOLIC BLOOD PRESSURE: 75 MMHG

## 2022-11-16 DIAGNOSIS — K59.00: ICD-10-CM

## 2022-11-16 DIAGNOSIS — F17.290: ICD-10-CM

## 2022-11-16 DIAGNOSIS — Z28.310: ICD-10-CM

## 2022-11-16 DIAGNOSIS — R10.31: ICD-10-CM

## 2022-11-16 LAB
ALBUMIN SERPL-MCNC: 4 GM/DL (ref 3.2–4.5)
ALP SERPL-CCNC: 78 U/L (ref 40–136)
ALT SERPL-CCNC: 20 U/L (ref 0–55)
APTT PPP: YELLOW S
BACTERIA #/AREA URNS HPF: (no result) /HPF
BASOPHILS # BLD AUTO: 0.1 10^3/UL (ref 0–0.1)
BASOPHILS NFR BLD AUTO: 1 % (ref 0–10)
BILIRUB SERPL-MCNC: 0.4 MG/DL (ref 0.1–1)
BILIRUB UR QL STRIP: (no result)
BUN/CREAT SERPL: 24
CALCIUM SERPL-MCNC: 9.6 MG/DL (ref 8.5–10.1)
CHLORIDE SERPL-SCNC: 105 MMOL/L (ref 98–107)
CO2 SERPL-SCNC: 20 MMOL/L (ref 21–32)
CREAT SERPL-MCNC: 0.82 MG/DL (ref 0.6–1.3)
EOSINOPHIL # BLD AUTO: 0.3 10^3/UL (ref 0–0.3)
EOSINOPHIL NFR BLD AUTO: 4 % (ref 0–10)
FIBRINOGEN PPP-MCNC: CLEAR MG/DL
GFR SERPLBLD BASED ON 1.73 SQ M-ARVRAT: 102 ML/MIN
GLUCOSE SERPL-MCNC: 111 MG/DL (ref 70–105)
GLUCOSE UR STRIP-MCNC: (no result) MG/DL
HCT VFR BLD CALC: 38 % (ref 35–52)
HGB BLD-MCNC: 12.7 G/DL (ref 11.5–16)
KETONES UR QL STRIP: NEGATIVE
LEUKOCYTE ESTERASE UR QL STRIP: (no result)
LYMPHOCYTES # BLD AUTO: 3.2 10^3/UL (ref 1–4)
LYMPHOCYTES NFR BLD AUTO: 46 % (ref 12–44)
MANUAL DIFFERENTIAL PERFORMED BLD QL: NO
MCH RBC QN AUTO: 30 PG (ref 25–34)
MCHC RBC AUTO-ENTMCNC: 34 G/DL (ref 32–36)
MCV RBC AUTO: 88 FL (ref 80–99)
MONOCYTES # BLD AUTO: 0.5 10^3/UL (ref 0–1)
MONOCYTES NFR BLD AUTO: 7 % (ref 0–12)
NEUTROPHILS # BLD AUTO: 3 10^3/UL (ref 1.8–7.8)
NEUTROPHILS NFR BLD AUTO: 43 % (ref 42–75)
NITRITE UR QL STRIP: POSITIVE
PH UR STRIP: 5.5 [PH] (ref 5–9)
PLATELET # BLD: 517 10^3/UL (ref 130–400)
PMV BLD AUTO: 8.6 FL (ref 9–12.2)
POTASSIUM SERPL-SCNC: 4.2 MMOL/L (ref 3.6–5)
PROT SERPL-MCNC: 7.5 GM/DL (ref 6.4–8.2)
PROT UR QL STRIP: (no result)
RBC #/AREA URNS HPF: (no result) /HPF
SODIUM SERPL-SCNC: 138 MMOL/L (ref 135–145)
SP GR UR STRIP: >=1.03 (ref 1.02–1.02)
SQUAMOUS #/AREA URNS HPF: (no result) /HPF
WBC # BLD AUTO: 7 10^3/UL (ref 4.3–11)
WBC #/AREA URNS HPF: (no result) /HPF

## 2022-11-16 PROCEDURE — 36415 COLL VENOUS BLD VENIPUNCTURE: CPT

## 2022-11-16 PROCEDURE — 87088 URINE BACTERIA CULTURE: CPT

## 2022-11-16 PROCEDURE — 80053 COMPREHEN METABOLIC PANEL: CPT

## 2022-11-16 PROCEDURE — 81000 URINALYSIS NONAUTO W/SCOPE: CPT

## 2022-11-16 PROCEDURE — 86141 C-REACTIVE PROTEIN HS: CPT

## 2022-11-16 PROCEDURE — 85025 COMPLETE CBC W/AUTO DIFF WBC: CPT

## 2022-11-16 PROCEDURE — 74018 RADEX ABDOMEN 1 VIEW: CPT

## 2022-11-16 NOTE — ED ABDOMINAL PAIN
General


Chief Complaint:  Postpartum (<6 weeks)


Stated Complaint:  1 WEEK POST-BIRTH ABD PAIN


Nursing Triage Note:  


PT AMB TO TRIAGE W C/O SEVERE SHARP RLQ PAIN THAT RADIATES UP THROUGH ABD SX 


YESTERDAY. VAGINAL DELIVERY ON 22 W DR. MOJICA, PT DENIES COMPLICATIONS 


DURING PREGNANCY AND DELIVERY. PT A&OX4.


Source of Information:  Patient


Exam Limitations:  No Limitations





History of Present Illness


Date Seen by Provider:  2022


Time Seen by Provider:  13:33


Initial Comments


This 22-year-old young lady is 7 days postpartum and complains of a right lower 

quadrant pain.  Pain has been generalized but more focused on the right lower 

quadrant.  This pain is a notable increase from her postpartum pain up until 

yesterday.  It is worse with movement.  She had a normal vaginal delivery 

without complications except minor tear.  She denies fever, nausea, vomiting, or

diarrhea.  She has not had a bowel movement in 3 days.  Her lochia has been decr

easing and is brownish-red.  She denies fever or urinary changes.  She had been 

using stool softeners until her stools became overly soft 3 days ago.  She then 

stopped the stool softeners and has had no more bowel movements.  Her 

obstetrical provider is Dr. Mojica.  Her PCP is Erinn Bocanegra.  She took 

ibuprofen at home and pain has improved significantly since arriving here.





Allergies and Home Medications


Allergies


Coded Allergies:  


     ceftriaxone (Verified  Allergy, Mild, HIVES, 22)





Patient Home Medication List


Home Medication List Reviewed:  Yes


Empagliflozin (Jardiance) 10 Mg Tablet, 10 MG PO DAILY


   Prescribed by: SANJIV MOJICA on 11/10/22 0756


Ferrous Sulfate (Iron) 325 Mg (65 Mg Iron) Tablet, 325 MG PO DAILY, (Reported)


   Entered as Reported by: KODY NOVOA on 22 1117


Ibuprofen (Ibu) 600 Mg Tablet, 600 MG PO Q6H


   Prescribed by: SANJIV MOJICA on 11/10/22 0756


Metformin HCl (Metformin HCl) 500 Mg Tablet, 1,000 MG PO BID, (Reported)


   Entered as Reported by: KODY NOVOA on 22 1117


Pnv No.121/Iron/Folic Acid (Prenatal Multivitamin Tablet) 28 Mg Iron-800 Mcg 

Tablet, 1 EACH PO DAILY, (Reported)


   Entered as Reported by: KODY NOVOA on 22 111


Polyethylene Glycol 3350 (Miralax) 17 Gram/Dose Powder, 17 GM PO TID PRN for CO

NSTIPATION-1ST LINE


   Prescribed by: BALDOMERO DALY on 22 1451


Discontinued Medications


Insulin NPH Human Isophane (Humulin N) 100 Unit/Ml Vial, 44 UNITS SC DAILY, 

(Reported)


   Entered as Reported by: KODY NOVOA on 22 111


Insulin NPH Human Isophane (Humulin N) 100 Unit/Ml Vial, 34 UNIT SQ HS, 

(Reported)


   Entered as Reported by: KODY NOVOA on 22 111





Review of Systems


Review of Systems


Constitutional:  no symptoms reported


EENTM:  No Symptoms Reported


Respiratory:  No Symptoms Reported


Cardiovascular:  No Symptoms Reported


Gastrointestinal:  See HPI


Genitourinary:  See HPI


Musculoskeletal:  no symptoms reported


Skin:  no symptoms reported


Psychiatric/Neurological:  No Symptoms Reported


Endocrine:  No Symptoms Reported


Hematologic/Lymphatic:  No Symptoms Reported





Past Medical-Social-Family Hx


Patient Social History


Tobacco Use?:  No


Use of E-Cig and/or Vaping dev:  Yes


E-Cig or Vaping type used:  Nicotine


Use of E-Cig and/or Vaping Miky:  Current Everyday User


Substance use?:  No


Alcohol Use?:  No





Immunizations Up To Date


Tetanus Booster (TDap):  Less than 5yrs


PED Vaccines UTD:  No


Influenza Vaccine Up-to-Date:  Yes; Up-to-Date


First/Initial COVID19 Vaccinat:  NONE


Second COVID19 Vaccination Vcítor:  NONE


Third COVID19 Vaccination Date:  NONE


COVID19 Vaccine :  NONE





Seasonal Allergies


Seasonal Allergies:  Yes





Past Medical History


Surgeries:  Yes (T&A, MULTIPLE ABSCESS I&D'S, "BLADDER STRETCHED" WHEN YOUNGER)


Adenoidectomy, Bladder Surgery, Tonsillectomy


Respiratory:  No


Cardiac:  No


Neurological:  No


Female Reproductive Disorders:  Denies


Genitourinary:  Yes


UTI-Chronic


Gastrointestinal:  Yes (Chronic constipation)


Chronic Constipation


Musculoskeletal:  No


Endocrine:  Yes (Use insulin during pregnancy)


Diabetes, Non-Insulin dep


HEENT:  No


Cancer:  No


Psychosocial:  No


Integumentary:  Yes


Blood Disorders:  No





Physical Exam


Vital Signs





Vital Signs - First Documented








 22





 12:55


 


Temp 36.3


 


Pulse 106


 


Resp 20


 


B/P (MAP) 112/76 (88)


 


Pulse Ox 98


 


O2 Delivery Room Air





Capillary Refill : Less Than 3 Seconds


Height/Weight/BMI


Height: 5'5.00"


Weight: 200lbs. 0oz. 90.235783yr; 34.00 BMI


Method:Stated


General Appearance:  WD/WN, no apparent distress


HEENT:  normal ENT inspection


Neck:  normal inspection


Respiratory:  lungs clear, normal breath sounds, no respiratory distress


Cardiovascular:  regular rate, rhythm, no edema, no murmur


Gastrointestinal:  normal bowel sounds, soft; No distended, No guarding, No 

rebound; tenderness (Minimal in the right lower quadrant)


Extremities:  normal inspection, no pedal edema


Neurologic/Psychiatric:  no motor/sensory deficits, alert, normal mood/affect, 

oriented x 3


Skin:  normal color, warm/dry





Progress/Results/Core Measures


Results/Orders


Lab Results





Laboratory Tests








Test


 22


13:08 22


13:47 Range/Units


 


 


White Blood Count


 7.0 


 


 4.3-11.0


10^3/uL


 


Red Blood Count


 4.29 


 


 3.80-5.11


10^6/uL


 


Hemoglobin 12.7   11.5-16.0  g/dL


 


Hematocrit 38   35-52  %


 


Mean Corpuscular Volume 88   80-99  fL


 


Mean Corpuscular Hemoglobin 30   25-34  pg


 


Mean Corpuscular Hemoglobin


Concent 34 


 


 32-36  g/dL





 


Red Cell Distribution Width 12.8   10.0-14.5  %


 


Platelet Count


 517 H


 


 130-400


10^3/uL


 


Mean Platelet Volume 8.6 L  9.0-12.2  fL


 


Immature Granulocyte % (Auto) 0    %


 


Neutrophils (%) (Auto) 43   42-75  %


 


Lymphocytes (%) (Auto) 46 H  12-44  %


 


Monocytes (%) (Auto) 7   0-12  %


 


Eosinophils (%) (Auto) 4   0-10  %


 


Basophils (%) (Auto) 1   0-10  %


 


Neutrophils # (Auto)


 3.0 


 


 1.8-7.8


10^3/uL


 


Lymphocytes # (Auto)


 3.2 


 


 1.0-4.0


10^3/uL


 


Monocytes # (Auto)


 0.5 


 


 0.0-1.0


10^3/uL


 


Eosinophils # (Auto)


 0.3 


 


 0.0-0.3


10^3/uL


 


Basophils # (Auto)


 0.1 


 


 0.0-0.1


10^3/uL


 


Immature Granulocyte # (Auto)


 0.0 


 


 0.0-0.1


10^3/uL


 


Sodium Level 138   135-145  MMOL/L


 


Potassium Level 4.2   3.6-5.0  MMOL/L


 


Chloride Level 105     MMOL/L


 


Carbon Dioxide Level 20 L  21-32  MMOL/L


 


Anion Gap 13   5-14  MMOL/L


 


Blood Urea Nitrogen 20 H  7-18  MG/DL


 


Creatinine


 0.82 


 


 0.60-1.30


MG/DL


 


Estimat Glomerular Filtration


Rate 102 


 


  





 


BUN/Creatinine Ratio 24    


 


Glucose Level 111 H    MG/DL


 


Calcium Level 9.6   8.5-10.1  MG/DL


 


Corrected Calcium 9.6   8.5-10.1  MG/DL


 


Total Bilirubin 0.4   0.1-1.0  MG/DL


 


Aspartate Amino Transf


(AST/SGOT) 18 


 


 5-34  U/L





 


Alanine Aminotransferase


(ALT/SGPT) 20 


 


 0-55  U/L





 


Alkaline Phosphatase 78     U/L


 


C-Reactive Protein High


Sensitivity 1.07 H


 


 0.00-0.50


MG/DL


 


Total Protein 7.5   6.4-8.2  GM/DL


 


Albumin 4.0   3.2-4.5  GM/DL


 


Urine Color  YELLOW   


 


Urine Clarity  CLEAR   


 


Urine pH  5.5  5-9  


 


Urine Specific Gravity  >=1.030  1.016-1.022  


 


Urine Protein  2+ H NEGATIVE  


 


Urine Glucose (UA)  3+ H NEGATIVE  


 


Urine Ketones  NEGATIVE  NEGATIVE  


 


Urine Nitrite  POSITIVE H NEGATIVE  


 


Urine Bilirubin  1+ H NEGATIVE  


 


Urine Urobilinogen  0.2  < = 1.0  MG/DL


 


Urine Leukocyte Esterase  TRACE H NEGATIVE  


 


Urine RBC (Auto)  3+ H NEGATIVE  


 


Urine RBC  TNTC H  /HPF


 


Urine WBC  5-10 H  /HPF


 


Urine Squamous Epithelial


Cells 


 2-5 


  /HPF





 


Urine Crystals  NONE   /LPF


 


Urine Bacteria  TRACE   /HPF


 


Urine Casts  NONE   /LPF


 


Urine Mucus  NEGATIVE   /LPF


 


Urine Culture Indicated  YES   








My Orders





Orders - BALDOMERO SOLORZANO MD


Cbc With Automated Diff (22 13:45)


Comprehensive Metabolic Panel (22 13:45)


Hs C Reactive Protein (22 13:45)


Ua Culture If Indicated (22 13:45)


Ed Iv/Invasive Line Start (22 13:45)


Abdomen/Kub 1view (22 13:45)


Urine Culture (22 13:47)





Vital Signs/I&O











 22





 12:55 14:58


 


Temp 36.3 


 


Pulse 106 


 


Resp 20 


 


B/P (MAP) 112/76 (88) 107/75


 


Pulse Ox 98 100


 


O2 Delivery Room Air Room Air














Blood Pressure Mean:                    88











Progress


Progress Note :  


Progress Note


Abdominal x-ray revealed a notable stool burden.  Patient may be developing 

constipation.  Urinalysis did not show a disproportionate amount of bacteria or 

WBC and a urine specimen likely contaminated by lochia.  Patient's pain had 

improved significantly without any treatment other than her oral ibuprofen.  I 

advised using some MiraLAX to help clear her stool burden.  If symptoms do not 

improve, she may need further evaluation and may need a catheter urine specimen.





Diagnostic Imaging





   Diagonstic Imaging:  Xray


   Plain Films/CT/US/NM/MRI:  abdomen, pelvis


Comments


NAME:   PONCE SINGH


[facility] REC#:   [id number]


ACCOUNT#:   [id number]


PT STATUS:   DEP ER


:   2000


PHYSICIAN:   BALDOMERO SOLORZANO MD


ADMIT DATE:   22/ER


***Signed***


Date of Exam:22





ABDOMEN/KUB 1VIEW








Indication: Abdominal pain





Abdominal film obtained 2:09 hours p.m. and compared to


2016.





There is prominent stool throughout the colon.  There is no overt


obstruction or ileus. There is no significant small bowel


distention. There are no visualized abdominal calcifications.





Impression:  Prominent stool throughout the colon with no overt


obstruction or ileus.





Dictated by: 





  Dictated on workstation # [id number]








Dict:   226


Trans:   22


[facility] [id number]





Interpreted by:     KARLENE SHERMAN MD


Electronically signed by: KARLENE SHERMAN MD 22





Departure


Impression





   Primary Impression:  


   Right lower quadrant pain


   Additional Impression:  


   Constipation


   Qualified Codes:  K59.00 - Constipation, unspecified


Disposition:  01 HOME, SELF-CARE


Condition:  Improved





Departure-Patient Inst.


Decision time for Depature:  14:48


Referrals:  


ERINN BOCANEGRA (PCP)


Primary Care Physician








[facility] (Family)


Primary Care Physician


Patient Instructions:  Abdominal Pain, Adult ED, Constipation, Adult ED





Add. Discharge Instructions:  


The exact cause of your abdominal pain is uncertain at this time but may be 

related to constipation.


Use MiraLAX 2 or 3 times daily until you produce a good bowel movement.  Then 

use as needed.


You may use Tylenol (acetaminophen) up to 1000 mg every 6 hours as needed and/or

ibuprofen up to 600 mg every 6 hours as needed.


Drink plenty of clear liquids to stay well-hydrated and eat a diet high in 

fiber, fruits, vegetables, and whole grains.


Return to the ER if you have worsening symptoms, especially if you develop new 

symptoms such as vomiting, fever, or severe pain.





All discharge instructions reviewed with patient and/or family. Voiced 

understanding.


Scripts


Polyethylene Glycol 3350 (Miralax) 17 Gram/Dose Powder


17 GM PO TID PRN for CONSTIPATION-1ST LINE, #1 EA


   Mix 17 g in 12 ounces clear liquid.


   Prov: BALDOMERO SOLORZANO MD         22





Copy


Copies To 1:   SANJIV MOJICA MD


Copies To 2:   [facility]/BALDOMERO HUMMEL MD        2022 14:51

## 2022-11-16 NOTE — DIAGNOSTIC IMAGING REPORT
Indication: Abdominal pain



Abdominal film obtained 2:09 hours p.m. and compared to

05/25/2016.



There is prominent stool throughout the colon.  There is no overt

obstruction or ileus. There is no significant small bowel

distention. There are no visualized abdominal calcifications.



Impression:  Prominent stool throughout the colon with no overt

obstruction or ileus.



Dictated by: 



  Dictated on workstation # [id number]